# Patient Record
Sex: MALE | Race: WHITE | Employment: OTHER | URBAN - METROPOLITAN AREA
[De-identification: names, ages, dates, MRNs, and addresses within clinical notes are randomized per-mention and may not be internally consistent; named-entity substitution may affect disease eponyms.]

---

## 2017-07-28 ENCOUNTER — TELEPHONE (OUTPATIENT)
Dept: NEUROLOGY | Age: 57
End: 2017-07-28

## 2017-07-28 NOTE — TELEPHONE ENCOUNTER
----- Message from Trinity Castro sent at 7/28/2017 10:54 AM EDT -----  Regarding: Dr. Mendel [place] Telephone  Pt would like a callback to schedule Botox Injection.      [phone]

## 2017-08-02 NOTE — TELEPHONE ENCOUNTER
Called and spoke to patient scheduled for follow up with NP on 8/16  Will need new PA for botox  Patient states understanding

## 2017-08-16 ENCOUNTER — TELEPHONE (OUTPATIENT)
Dept: NEUROLOGY | Age: 57
End: 2017-08-16

## 2017-08-16 ENCOUNTER — OFFICE VISIT (OUTPATIENT)
Dept: NEUROLOGY | Age: 57
End: 2017-08-16

## 2017-08-16 VITALS
BODY MASS INDEX: 23.33 KG/M2 | DIASTOLIC BLOOD PRESSURE: 76 MMHG | WEIGHT: 176 LBS | SYSTOLIC BLOOD PRESSURE: 118 MMHG | HEIGHT: 73 IN

## 2017-08-16 DIAGNOSIS — R06.02 SOB (SHORTNESS OF BREATH): ICD-10-CM

## 2017-08-16 DIAGNOSIS — G43.909 MIGRAINE WITHOUT STATUS MIGRAINOSUS, NOT INTRACTABLE, UNSPECIFIED MIGRAINE TYPE: Primary | Chronic | ICD-10-CM

## 2017-08-16 NOTE — PROGRESS NOTES
Pt has questions about SUMAVEL prescription used to be 6 injections but it now 1 pen and cost $40 per pen. He states prescription now has a generic version. Pt went to hospital 07/2017 for migraines. Pt states he missed appt for botox and needs to be reevaluated to get prescription again.

## 2017-08-16 NOTE — PROGRESS NOTES
Tere Patel is a 64 y.o. male who presents with the following  Chief Complaint   Patient presents with    Follow-up       HPI Patient comes in for a follow up for migraines. He was on a botox schedule and doing well every 3 months but had some health concerns that came up so he got off track and his last botox was in December. On botox he had about 3-4 migraines a month and the sumavel aborted in an hour or so. He states that his migraines are usually located on the front right side of his head and it feels like an exploding pulsating pain. He has associated light sensitivity, nausea, and vomiting. He has tried relpax and percocet from his primary care doctor to help with abortive therapy and he states this does not help at all. He is currently on Elavil and Inderal. Tried Topamx. Also on Lamictal. sumavel is 40$ a pen now and insurance only gives him one. It is very painful also. Currently off botox he is getting about 20 headaches a month lasting all day. Definitely a lot worse and back to where he was before botox. .     Current Outpatient Prescriptions   Medication Sig    onabotulinumtoxinA (BOTOX) 200 unit injection 200 Units by IntraMUSCular route every three (3) months. Indications: MIGRAINE PREVENTION    SUMAtriptan succinate (ZEMBRACE SYMTOUCH) 3 mg/0.5 mL pnij 1 Syringe by SubCUTAneous route as needed. At headache onset. May repeat again every hour X 4 doses. Max 4 doses in 24 hours.  SUMAtriptan succinate (ZEMBRACE SYMTOUCH) 3 mg/0.5 mL pnij 1 Syringe by SubCUTAneous route as needed. At headache onset. May repeat again every hour X 4 doses. Max 4 doses in 24 hours.  dihydroergotamine (MIGRANAL) 0.5 mg/pump act. (4 mg/mL) nasal spray One spray in each nostril and repeat in 15 minutes X 1 . No more then 4 sprays in 24 hours    DULOXETINE HCL (CYMBALTA PO) Take  by mouth daily.  multivitamin (ONE A DAY) tablet Take 1 Tab by mouth daily.       cyanocobalamin (VITAMIN B-12) 500 mcg tablet Take 500 mcg by mouth daily.  VITAMIN A/VITAMIN D2/COD LIVER (VITAMINS A & D PO) Take  by mouth.  CALCIUM CITRATE/VITAMIN D3 (CALCIUM CITRATE + D PO) Take  by mouth.  PROPRANOLOL HCL (INDERAL PO) Take  by mouth. 10 MG NIGHTLY    amitriptyline (ELAVIL) 100 mg tablet Take  by mouth nightly. 2 tabs at bedtime    metFORMIN SR (FORTAMET) tablet Take 500 mg by mouth.  aspirin delayed-release 81 mg tablet Take  by mouth daily.  lansoprazole (PREVACID) 30 mg capsule Take  by mouth two (2) times a day.  metoprolol (LOPRESSOR) 25 mg tablet Take  by mouth two (2) times a day.  lamoTRIgine (LAMICTAL) 100 mg tablet Take  by mouth daily.  metoclopramide (REGLAN) 10 mg tablet Take 10 mg by mouth Before breakfast, lunch, dinner and at bedtime. No current facility-administered medications for this visit.         History   Smoking Status    Current Every Day Smoker    Packs/day: 1.00   Smokeless Tobacco    Never Used       Past Medical History:   Diagnosis Date    Anxiety     Arrhythmia     SINUS TACHY    Langford's esophagus 3/19/2012    CAD (coronary artery disease)     Chronic pain     Depression     Diabetes mellitus ([facility] [address] 3/19/2012    GERD (gastroesophageal reflux disease)     Headache     Hearing loss     History of MI (myocardial infarction) 3-00    History of peptic ulcer disease 3/19/2012    HTN (hypertension) 3/19/2012    Memory loss     Morbid obesity ([facility] [address] 3/19/2012    2002-OPEN GASTRIC BYPASS    Other ill-defined conditions     MIGRAINES    Psychiatric disorder     Reflux 3/19/2012    Sleep apnea, obstructive 3/19/2012    Unspecified adverse effect of anesthesia     HEADACHE    Unspecified sleep apnea     HX-PRIOR TO GASTRIC BYPASS       Past Surgical History:   Procedure Laterality Date    APPENDECTOMY      BIOPSY LIVER  9-3-02    HX APPENDECTOMY      HX CHOLECYSTECTOMY      HX GASTRIC BYPASS  9-3-02    HX HERNIA REPAIR  4.3.2012    Dr. Rose Almaguer - laparoscopic incisional hernia repair    HX TONSILLECTOMY      LAP,VAGUS NERVE,TRUNCAL      NEUROLOGICAL PROCEDURE UNLISTED  2004    CERVICAL FUSION       Family History   Problem Relation Age of Onset    Diabetes Mother     Hypertension Mother     Obesity Mother     Emphysema Mother     Heart Disease Mother     Cancer Father      prostate    Obesity Sister     Other Sister      aneurysm    Alcohol abuse Brother     Headache Brother     Other Brother      aneurysm       Social History     Social History    Marital status:      Spouse name: N/A    Number of children: N/A    Years of education: N/A     Social History Main Topics    Smoking status: Current Every Day Smoker     Packs/day: 1.00    Smokeless tobacco: Never Used    Alcohol use Yes    Drug use: No    Sexual activity: Not Asked     Other Topics Concern    None     Social History Narrative       Review of Systems   HENT: Negative for ear pain, hearing loss and tinnitus. Eyes: Positive for blurred vision and photophobia. Respiratory: Positive for cough and shortness of breath. Negative for wheezing. Cardiovascular: Negative for chest pain and palpitations. Gastrointestinal: Positive for nausea and vomiting. Neurological: Positive for dizziness and headaches. Negative for seizures, loss of consciousness and weakness. Remainder of comprehensive review is negative. Physical Exam :    Visit Vitals    /76    Ht 6' 1\" (1.854 m)    Wt 79.8 kg (176 lb)    BMI 23.22 kg/m2       General: Well defined, nourished, and groomed individual in no acute distress.    Neck: Supple, nontender, no bruits, no pain with resistance to active range of motion.    Heart: Regular rate and rhythm, no murmurs, rub, or gallop. Normal S1S2.   Lungs: Clear to auscultation bilaterally with equal chest expansion, no cough, no wheeze  Musculoskeletal: Extremities revealed no edema and had full range of motion of joints.    Psych: Good mood and bright affect    NEUROLOGICAL EXAMINATION:    Mental Status: Alert and oriented to person, place, and time    Cranial Nerves:    II, III, IV, VI: Visual acuity grossly intact. Visual fields are normal.    Pupils are equal, round, and reactive to light and accommodation.    Extra-ocular movements are full and fluid. Fundoscopic exam was benign, no ptosis or nystagmus.    V-XII: Hearing is grossly intact. Facial features are symmetric, with normal sensation and strength. The palate rises symmetrically and the tongue protrudes midline. Sternocleidomastoids 5/5. Motor Examination: Normal tone, bulk, and strength, 5/5 muscle strength throughout. Coordination: Finger to nose was normal. No resting or intention tremor    Gait and Station: Steady while walking. Normal arm swing. No pronator drift. No muscle wasting or fasiculations noted. Reflexes: DTRs 2+ throughout. [place]         Results for orders placed or performed during the hospital encounter of 04/03/12   GLUCOSE, POC   Result Value Ref Range    Glucose (POC) 97 75 - 110 mg/dL    Performed by Nella Silva [place])    GLUCOSE, POC   Result Value Ref Range    Glucose (POC) 111 (H) 75 - 110 mg/dL    Performed by JASMINA ROBERSON    GLUCOSE, POC   Result Value Ref Range    Glucose (POC) 118 (H) 75 - 110 mg/dL    Performed by OCHOA BURK    GLUCOSE, POC   Result Value Ref Range    Glucose (POC) 124 (H) 75 - 110 mg/dL    Performed by OCHOA BURK    GLUCOSE, POC   Result Value Ref Range    Glucose (POC) 133 (H) 75 - 110 mg/dL    [facility]    GLUCOSE, POC   Result Value Ref Range    Glucose (POC) 166 (H) 75 - 110 mg/dL    Performed by MARK SZYMANSKI        Orders Placed This Encounter    REFERRAL TO NEUROLOGY     Referral Priority:   Routine     Referral Type:   Consultation     Referral Reason:   Specialty Services Required     Referred to Provider:   Debbie Montanez MD    REFERRAL TO PULMONARY DISEASE     Referral Priority:   Routine     Referral Type:   Consultation     Referral Reason:   Specialty Services Required     Referral Location:   [facility] [address].     Referred to Provider:   Kvng Kurtz MD    onabotulinumtoxinA (BOTOX) 200 unit injection     Si Units by IntraMUSCular route every three (3) months. Indications: MIGRAINE PREVENTION     Dispense:  200 Units     Refill:  3    SUMAtriptan succinate (ZEMBRACE SYMTOUCH) 3 mg/0.5 mL pnij     Si Syringe by SubCUTAneous route as needed. At headache onset. May repeat again every hour X 4 doses. Max 4 doses in 24 hours. Dispense:  2 Box     Refill:  5    SUMAtriptan succinate (ZEMBRACE SYMTOUCH) 3 mg/0.5 mL pnij     Si Syringe by SubCUTAneous route as needed. At headache onset. May repeat again every hour X 4 doses. Max 4 doses in 24 hours. Dispense:  2 Box     Refill:  5       1. Migraine without status migrainosus, not intractable, unspecified migraine type    2. SOB (shortness of breath)        Follow-up Disposition:  Return after botox. Migraines worse. We discussed starting back on Botox and we will reorder with his frequency, intensity and time as 20 headaches a month lasting all day. Continue elavil and inderal and lamictal. We will switch from [facility] to [facility] as it is not painful, less triptan effect and can use up to 4 doses daily for migraine abortive therapy. We will follow after botox.          This note will [facility] be viewable in [facility]

## 2017-08-16 NOTE — MR AVS SNAPSHOT
Visit Information Date & Time Provider Department Dept. Phone Encounter #  
 8/16/2017  9:00 AM Abby Davies NP [facility] [phone] [id number] Follow-up Instructions Return after botox. Upcoming Health Maintenance Date Due Hepatitis C Screening 1960 HEMOGLOBIN A1C Q6M 1960 LIPID PANEL Q1 1960 FOOT EXAM Q1 11/6/1970 MICROALBUMIN Q1 11/6/1970 EYE EXAM RETINAL OR DILATED Q1 11/6/1970 Pneumococcal 19-64 Medium Risk (1 of 1 - PPSV23) 11/6/1979 DTaP/Tdap/Td series (1 - Tdap) 11/6/1981 FOBT Q 1 YEAR AGE 50-75 11/6/2010 INFLUENZA AGE 9 TO ADULT 8/1/2017 Allergies as of 8/16/2017  Review Complete On: 8/16/2017 By: Shamar Terrell No Known Allergies Current Immunizations  Never Reviewed No immunizations on file. Not reviewed this visit You Were Diagnosed With   
  
 Codes Comments Migraine without status migrainosus, not intractable, unspecified migraine type    -  Primary ICD-10-CM: M13.896 ICD-9-CM: 346.90   
 SOB (shortness of breath)     ICD-10-CM: R06.02 
ICD-9-CM: 786.05 Vitals BP Height(growth percentile) Weight(growth percentile) BMI Smoking Status 118/76 6' 1\" (1.854 m) 176 lb (79.8 kg) 23.22 kg/m2 Current Every Day Smoker Vitals History BMI and BSA Data Body Mass Index Body Surface Area  
 23.22 kg/m 2 2.03 m 2 Preferred Pharmacy Pharmacy Name Phone [facility] [phone] Your Updated Medication List  
  
   
This list is accurate as of: 8/16/17  9:35 AM.  Always use your most recent med list.  
  
  
  
  
 amitriptyline 100 mg tablet Commonly known as:  ELAVIL Take  by mouth nightly. 2 tabs at bedtime  
  
 aspirin delayed-release 81 mg tablet Take  by mouth daily. CALCIUM CITRATE + D PO Take  by mouth. CYMBALTA PO Take  by mouth daily. dihydroergotamine 0.5 mg/pump act. (4 mg/mL) nasal spray Commonly known as:  Brendalyn Haggis One spray in each nostril and repeat in 15 minutes X 1 . No more then 4 sprays in 24 hours FORTAMET 500 mg Generic drug:  metFORMIN SR Take 500 mg by mouth. INDERAL PO Take  by mouth. 10 MG NIGHTLY  
  
 lamoTRIgine 100 mg tablet Commonly known as: LaMICtal  
Take  by mouth daily. metoprolol tartrate 25 mg tablet Commonly known as:  LOPRESSOR Take  by mouth two (2) times a day. multivitamin tablet Commonly known as:  ONE A DAY Take 1 Tab by mouth daily. onabotulinumtoxinA 200 unit injection Commonly known as:  BOTOX  
200 Units by IntraMUSCular route every three (3) months. Indications: MIGRAINE PREVENTION  
  
 PREVACID 30 mg capsule Generic drug:  lansoprazole Take  by mouth two (2) times a day. REGLAN 10 mg tablet Generic drug:  metoclopramide HCl Take 10 mg by mouth Before breakfast, lunch, dinner and at bedtime. * SUMAtriptan succinate 3 mg/0.5 mL Pnij Commonly known as:  Youlanda Plan  
1 Syringe by SubCUTAneous route as needed. At headache onset. May repeat again every hour X 4 doses. Max 4 doses in 24 hours. * SUMAtriptan succinate 3 mg/0.5 mL Pnij Commonly known as:  Youlanda Plan  
1 Syringe by SubCUTAneous route as needed. At headache onset. May repeat again every hour X 4 doses. Max 4 doses in 24 hours. VITAMIN B-12 500 mcg tablet Generic drug:  cyanocobalamin Take 500 mcg by mouth daily. VITAMINS A & D PO Take  by mouth. * Notice: This list has 2 medication(s) that are the same as other medications prescribed for you. Read the directions carefully, and ask your doctor or other care provider to review them with you. Prescriptions Printed Refills  
 onabotulinumtoxinA (BOTOX) 200 unit injection 3 Si Units by IntraMUSCular route every three (3) months. Indications: MIGRAINE PREVENTION Class: Print Route: IntraMUSCular Prescriptions Sent to Pharmacy Refills SUMAtriptan succinate (ZEMBRACE SYMTOUCH) 3 mg/0.5 mL pnij 5 Si Syringe by SubCUTAneous route as needed. At headache onset. May repeat again every hour X 4 doses. Max 4 doses in 24 hours. Class: Normal  
 Pharmacy: [facility] [id number] [facility], [address] [address] Ph #: [phone] Route: SubCUTAneous SUMAtriptan succinate (ZEMBRACE SYMTOUCH) 3 mg/0.5 mL pnij 5 Si Syringe by SubCUTAneous route as needed. At headache onset. May repeat again every hour X 4 doses. Max 4 doses in 24 hours. Class: Normal  
 Pharmacy: [facility] Ph #: [phone] Route: SubCUTAneous We Performed the Following REFERRAL TO NEUROLOGY [FDP19 Custom] Comments:  
 Please evaluate patient for botox REFERRAL TO PULMONARY DISEASE [BJG64 Custom] Comments:  
 Please evaluate patient for SOB Follow-up Instructions Return after botox. Referral Information Referral ID Referred By Referred To  
  
 [id number] Laina Fernandez MD   
   [address] Phone: [phone] Fax: [phone] Visits Status Start Date End Date 1 New Request 17 If your referral has a status of pending review or denied, additional information will be sent to support the outcome of this decision. Referral ID Referred By Referred To  
 [id number] [facility] [address] [place] [address] 33 Visits Status Start Date End Date 1 New Request 17 If your referral has a status of pending review or denied, additional information will be sent to support the outcome of this decision. Patient Instructions PRESCRIPTION REFILL POLICY [facility] Statement to Patients April 1, 2014 In an effort to ensure the large volume of patient prescription refills is processed in the most efficient and expeditious manner, we are asking our patients to assist us by calling your Pharmacy for all prescription refills, this will include also your  Mail Order Pharmacy. The pharmacy will contact our office electronically to continue the refill process. Please do not wait until the last minute to call your pharmacy. We need at least 48 hours (2days) to fill prescriptions. We also encourage you to call your pharmacy before going to  your prescription to make sure it is ready. With regard to controlled substance prescription refill requests (narcotic refills) that need to be picked up at our office, we ask your cooperation by providing us with at least 72 hours (3days) notice that you will need a refill. We will not refill narcotic prescription refill requests after 4:00pm on any weekday, Monday through Thursday, or after 2:00pm on Fridays, or on the weekends. We encourage everyone to explore another way of getting your prescription refill request processed using [employer], our patient web portal through our electronic medical record system. [employer] is an efficient and effective way to communicate your medication request directly to the office and  downloadable as an uriah on your smart phone . [employer] also features a review functionality that allows you to view your medication list as well as leave messages for your physician. Are you ready to get connected? If so please review the attatched instructions or speak to any of our staff to get you set up right away! Thank you so much for your cooperation. Should you have any questions please contact our Practice Administrator. The Physicians and Staff,  [facility] [place] [address] What is a living will? A living will is a legal form you use to write down the kind of care you want at the end of your life. It is used by the health professionals who will treat you if you aren't able to decide for yourself. If you put your wishes in writing, your loved ones and others will know what kind of care you want. They won't need to guess. This can ease your mind and be helpful to others. A living will is not the same as an estate or property will. An estate will explains what you want to happen with your money and property after you die. Is a living will a legal document? A living will is a legal document. Each state has its own laws about living warren. If you move to another state, make sure that your living will is legal in the state where you now live. Or you might use a universal form that has been approved by many states. This kind of form can sometimes be completed and stored online. Your electronic copy will then be available wherever you have a connection to the Internet. In most cases, doctors will respect your wishes even if you have a form from a different state. · You don't need an  to complete a living will. But legal advice can be helpful if your state's laws are unclear, your health history is complicated, or your family can't agree on what should be in your living will. · You can change your living will at any time. Some people find that their wishes about end-of-life care change as their health changes. · In addition to making a living will, think about completing a medical power of  form. This form lets you name the person you want to make end-of-life treatment decisions for you (your \"health care agent\") if you're not able to. Many hospitals and nursing homes will give you the forms you need to complete a living will and a medical power of .  
· Your living will is used only if you can't make or communicate decisions for yourself anymore. If you become able to make decisions again, you can accept or refuse any treatment, no matter what you wrote in your living will. · Your state may offer an online registry. This is a place where you can store your living will online so the doctors and nurses who need to treat you can find it right away. What should you think about when creating a living will? Talk about your end-of-life wishes with your family members and your doctor. Let them know what you want. That way the people making decisions for you won't be surprised by your choices. Think about these questions as you make your living will: · Do you know enough about life support methods that might be used? If not, talk to your doctor so you know what might be done if you can't breathe on your own, your heart stops, or you're unable to swallow. · What things would you still want to be able to do after you receive life-support methods? Would you want to be able to walk? To speak? To eat on your own? To live without the help of machines? · If you have a choice, where do you want to be cared for? In your home? At a hospital or nursing home? · Do you want certain Church practices performed if you become very ill? · If you have a choice at the end of your life, where would you prefer to die? At home? In a hospital or nursing home? Somewhere else? · Would you prefer to be buried or cremated? · Do you want your organs to be donated after you die? What should you do with your living will? · Make sure that your family members and your health care agent have copies of your living will. · Give your doctor a copy of your living will to keep in your medical record. If you have more than one doctor, make sure that each one has a copy. · You may want to put a copy of your living will where it can be easily found. Where can you learn more? Go to http://lore-vin.info/. Enter O560 in the search box to learn more about \"Learning About Living Miguel Ángel. \" Current as of: August 8, 2016 Content Version: 11.3 © 0599-0172 [employer]. Care instructions adapted under license by skedge.me (which disclaims liability or warranty for this information). If you have questions about a medical condition or this instruction, always ask your healthcare professional. [place] [address] any warranty or liability for your use of this information. Advance Directives: Care Instructions Your Care Instructions An advance directive is a legal way to state your wishes at the end of your life. It tells your family and your doctor what to do if you can no longer say what you want. There are two main types of advance directives. You can change them any time that your wishes change. · A living will tells your family and your doctor your wishes about life support and other treatment. · A durable power of  for health care lets you name a person to make treatment decisions for you when you can't speak for yourself. This person is called a health care agent. If you do not have an advance directive, decisions about your medical care may be made by a doctor or a  who doesn't know you. It may help to think of an advance directive as a gift to the people who care for you. If you have one, they won't have to make tough decisions by themselves. Follow-up care is a key part of your treatment and safety. Be sure to make and go to all appointments, and call your doctor if you are having problems. It's also a good idea to know your test results and keep a list of the medicines you take. How can you care for yourself at home? · Discuss your wishes with your loved ones and your doctor. This way, there are no surprises. · Many states have a unique form. Or you might use a universal form that has been approved by many states.  This kind of form can sometimes be completed and stored online. Your electronic copy will then be available wherever you have a connection to the Internet. In most cases, doctors will respect your wishes even if you have a form from a different state. · You don't need a  to do an advance directive. But you may want to get legal advice. · Think about these questions when you prepare an advance directive: ¨ Who do you want to make decisions about your medical care if you are not able to? Many people choose a family member or close friend. ¨ Do you know enough about life support methods that might be used? If not, talk to your doctor so you understand. ¨ What are you most afraid of that might happen? You might be afraid of having pain, losing your independence, or being kept alive by machines. ¨ Where would you prefer to die? Choices include your home, a hospital, or a nursing home. ¨ Would you like to have information about hospice care to support you and your family? ¨ Do you want to donate organs when you die? ¨ Do you want certain Sikhism practices performed before you die? If so, put your wishes in the advance directive. · Read your advance directive every year, and make changes as needed. When should you call for help? Be sure to contact your doctor if you have any questions. Where can you learn more? Go to http://lore-vin.info/. Enter R264 in the search box to learn more about \"Advance Directives: Care Instructions. \" Current as of: November 17, 2016 Content Version: 11.3 © 3821-6227 Shoutlet. Care instructions adapted under license by [employer] (which disclaims liability or warranty for this information). If you have questions about a medical condition or this instruction, always ask your healthcare professional. [place] [address] any warranty or liability for your use of this information. Introducing [facility]! Alysia Cabezas introduces [facility] patient portal. Now you can access parts of your medical record, email your doctor's office, and request medication refills online. 1. In your internet browser, go to [email or web address] on the Run 2. Click on the First Time User? Click Here link in the Sign In box. You will see the New Member Sign Up page. 3. Enter your [facility] Access Code exactly as it appears below. You will not need to use this code after youve completed the sign-up process. If you do not sign up before the expiration date, you must request a new code. · [facility] Access Code: [email or web address] Expires: 11/14/2017  9:35 AM 
 
4. Enter the last four digits of your Social Security Number (xxxx) and Date of Birth (mm/dd/yyyy) as indicated and click Submit. You will be taken to the next sign-up page. 5. Create a [facility] ID. This will be your [facility] login ID and cannot be changed, so think of one that is secure and easy to remember. 6. Create a [facility] password. You can change your password at any time. 7. Enter your Password Reset Question and Answer. This can be used at a later time if you forget your password. 8. Enter your e-mail address. You will receive e-mail notification when new information is available in [address] 9. Click Sign Up. You can now view and download portions of your medical record. 10. Click the Download Summary menu link to download a portable copy of your medical information. If you have questions, please visit the Frequently Asked Questions section of the [facility] website. Remember, [facility] is NOT to be used for urgent needs. For medical emergencies, dial 911. Now available from your iPhone and Android! Please provide this summary of care documentation to your next provider. Your primary care clinician is listed as Dino Whittington. If you have any questions after today's visit, please call [phone].

## 2017-08-29 ENCOUNTER — TELEPHONE (OUTPATIENT)
Dept: NEUROLOGY | Age: 57
End: 2017-08-29

## 2017-09-05 NOTE — TELEPHONE ENCOUNTER
Called and spoke to pt.  Scheduled botox inj for Tuesday, September 19, 2017 08:00 AM  With Dr. Bernard Orellana

## 2017-09-19 ENCOUNTER — OFFICE VISIT (OUTPATIENT)
Dept: NEUROLOGY | Age: 57
End: 2017-09-19

## 2017-09-19 VITALS
OXYGEN SATURATION: 97 % | DIASTOLIC BLOOD PRESSURE: 74 MMHG | SYSTOLIC BLOOD PRESSURE: 128 MMHG | HEART RATE: 91 BPM | WEIGHT: 184.5 LBS | TEMPERATURE: 98.9 F | BODY MASS INDEX: 24.45 KG/M2 | HEIGHT: 73 IN | RESPIRATION RATE: 18 BRPM

## 2017-09-19 DIAGNOSIS — G43.709 CHRONIC MIGRAINE WITHOUT AURA WITHOUT STATUS MIGRAINOSUS, NOT INTRACTABLE: Primary | ICD-10-CM

## 2017-09-19 DIAGNOSIS — G43.909 MIGRAINE WITHOUT STATUS MIGRAINOSUS, NOT INTRACTABLE, UNSPECIFIED MIGRAINE TYPE: Chronic | ICD-10-CM

## 2017-09-19 NOTE — PROGRESS NOTES
This patient had BotoxA  administered for 31 injections all intramuscular and with the exception of one injection, the procerus, they were right and left-sided. The medicine was reconstituted as Botox A 200 units with 4cc of 0.9% normal saline without preservative. The ensuing mixture was 5 units per 1/10CC and administered intramuscularly. The  muscles x2 for a total of 10 units, the procerus muscle x1 per 5 units, the frontalis muscle x4 for 20 units, the temporalis muscles x8 for 40 units,  Occipitalis muscles x6 for 30 units, the cervical paraspinals x4 for 20 units, and finally the trapezius muscles x6 for a total of 30 units. This totalled 155 units of Botox A with 45 units wastage. Patient tolerated the procedure without any ill effects. It is recommended that she keep her head upright for the next 4 hours.       [facility]  OFFICE PROCEDURE PROGRESS NOTE        Chart reviewed for the following:   Josue Fajardo MD, have reviewed the History, Physical and updated the Allergic reactions for [employer]     TIME OUT performed immediately prior to start of procedure:   I, Ijeoma Whitlock MD, have performed the following reviews on [place] prior to the start of the procedure:            * Patient was identified by name and date of birth   * Agreement on procedure being performed was verified  * Risks and Benefits explained to the patient  * Procedure site verified and marked as necessary  * Patient was positioned for comfort  * Consent was signed and verified     Time: 235 PM      Date of procedure: 9/19/2017    Procedure performed by:  Ijeoma Whitlock MD    Provider assisted by: n/a    Patient assisted by:n/a    How tolerated by patient:tolerated    Post Procedural Pain Scale: 2 - Hurts Little Bit    Comments: none

## 2017-09-19 NOTE — PROGRESS NOTES
Pain scale prior to procedure   0/10  Pain scale post procedure     0/10  Patient states 3-5  headaches a month & lasting  48-72  hrs  Been treated for headaches greater than 6 months  Consent signed     Instructions post injection discussed with patient   1. Do not lay flat for 4 hrs  2. Do not press on injection sites up to 24 hrs.         What to expect  Possible bleeding and/or swelling  at injection sites      Patient verbalizes understanding        Reviewed record in preparation for visit and have necessary documentation  Pt did not bring medication to office visit for review  Information was given to pt on Advanced Directives, Living Will  opportunity was given for questions

## 2017-09-19 NOTE — MR AVS SNAPSHOT
Visit Information Date & Time Provider Department Dept. Phone Encounter #  
 9/19/2017  2:00 PM MD Candi Silverio [facility] Neurology [facility] [phone] [id number] Upcoming Health Maintenance Date Due Hepatitis C Screening 1960 HEMOGLOBIN A1C Q6M 1960 LIPID PANEL Q1 1960 FOOT EXAM Q1 11/6/1970 MICROALBUMIN Q1 11/6/1970 EYE EXAM RETINAL OR DILATED Q1 11/6/1970 Pneumococcal 19-64 Medium Risk (1 of 1 - PPSV23) 11/6/1979 DTaP/Tdap/Td series (1 - Tdap) 11/6/1981 FOBT Q 1 YEAR AGE 50-75 11/6/2010 INFLUENZA AGE 9 TO ADULT 8/1/2017 Allergies as of 9/19/2017  Review Complete On: 9/19/2017 By: Lauryn Farley MD  
 No Known Allergies Current Immunizations  Never Reviewed No immunizations on file. Not reviewed this visit You Were Diagnosed With   
  
 Codes Comments Chronic migraine without aura without status migrainosus, not intractable    -  Primary ICD-10-CM: P66.363 ICD-9-CM: 346.70 Vitals BP Pulse Temp Resp Height(growth percentile) Weight(growth percentile) 128/74 91 98.9 °F (37.2 °C) (Oral) 18 6' 1\" (1.854 m) 184 lb 8 oz (83.7 kg) SpO2 BMI Smoking Status 97% 24.34 kg/m2 Current Every Day Smoker Vitals History BMI and BSA Data Body Mass Index Body Surface Area  
 24.34 kg/m 2 2.08 m 2 Preferred Pharmacy Pharmacy Name Phone [facility] [phone] Your Updated Medication List  
  
   
This list is accurate as of: 9/19/17  2:32 PM.  Always use your most recent med list.  
  
  
  
  
 amitriptyline 100 mg tablet Commonly known as:  ELAVIL Take  by mouth nightly. 2 tabs at bedtime  
  
 aspirin delayed-release 81 mg tablet Take  by mouth daily. CALCIUM CITRATE + D PO Take  by mouth. CYMBALTA PO Take  by mouth daily. dihydroergotamine 0.5 mg/pump act. (4 mg/mL) nasal spray Commonly known as:  Taisha Persons One spray in each nostril and repeat in 15 minutes X 1 . No more then 4 sprays in 24 hours FORTAMET 500 mg Generic drug:  metFORMIN SR Take 500 mg by mouth. INDERAL PO Take  by mouth. 10 MG NIGHTLY  
  
 lamoTRIgine 100 mg tablet Commonly known as: LaMICtal  
Take  by mouth daily. metoprolol tartrate 25 mg tablet Commonly known as:  LOPRESSOR Take  by mouth two (2) times a day. multivitamin tablet Commonly known as:  ONE A DAY Take 1 Tab by mouth daily. onabotulinumtoxinA 200 unit injection Commonly known as:  BOTOX  
200 Units by IntraMUSCular route every three (3) months. Indications: MIGRAINE PREVENTION  
  
 PREVACID 30 mg capsule Generic drug:  lansoprazole Take  by mouth two (2) times a day. REGLAN 10 mg tablet Generic drug:  metoclopramide HCl Take 10 mg by mouth Before breakfast, lunch, dinner and at bedtime. SUMAtriptan succinate 3 mg/0.5 mL Pnij Commonly known as:  Jane Punt  
1 Syringe by SubCUTAneous route as needed. At headache onset. May repeat again every hour X 4 doses. Max 4 doses in 24 hours. VITAMIN B-12 500 mcg tablet Generic drug:  cyanocobalamin Take 500 mcg by mouth daily. VITAMINS A & D PO Take  by mouth. Patient Instructions OnabotulinumtoxinA (By injection) OnabotulinumtoxinA (nx-k-vyk-vp-VKT-zhz-tox-in-ay) Treats muscle stiffness, muscle spasms, excessive sweating, overactive bladder, or loss of bladder control. Prevents chronic migraine headaches. Improves the appearance of wrinkles on the face. Brand Name(s): Botox, Botox Cosmetic There may be other brand names for this medicine. When This Medicine Should Not Be Used: This medicine is not right for everyone.  You should not receive this medicine if you had an allergic reaction to onabotulinumtoxinA or any other botulinum toxin product. How to Use This Medicine:  
Injectable · Your doctor will prescribe your exact dose and tell you how often it should be given. This medicine is given by a healthcare provider as a shot under your skin or into a muscle. · You may be given medicine to numb the area where the shot will be injected. If you receive the medicine around your eyes, you may be given eye drops or ointment to numb the area. After your injection, you may need to wear a protective contact lens or eye patch. · If you are being treated for excessive sweating, shave your underarms but do not use deodorant for 24 hours before your injection. Avoid exercise, hot foods or liquids, or anything else that could make you sweat for 30 minutes before your injection. · The recommended treatment schedule for chronic migraine is every 12 weeks. · This medicine works slowly. Once your condition has improved, the medicine will last about 3 months, then the effects will slowly go away. You might need more injections to treat your condition. ¨ Muscle spasms in the eyelids should improve within 3 to 10 days. ¨ Eye muscle problems should improve 1 or 2 days after the injection, and the improvement should last for 2 to 6 weeks. ¨ Neck pain should improve within 2 to 6 weeks. ¨ Arm stiffness should improve within 4 to 6 weeks. ¨ Facial lines or wrinkles should improve 1 or 2 days. · This medicine should come with a Medication Guide. Ask your pharmacist for a copy if you do not have one. · Missed dose:Call your doctor or pharmacist for instructions. Drugs and Foods to Avoid: Ask your doctor or pharmacist before using any other medicine, including over-the-counter medicines, vitamins, and herbal products. · Some foods and medicine can affect how onabotulinumtoxinA works. Tell your doctor if you are using any of the following: ¨ Aspirin or a blood thinner (such as ticlopidine, warfarin) ¨ Muscle relaxer ¨ Medicine for an infection (such as amikacin, gentamicin, streptomycin, tobramycin) · Tell your doctor if you have received an injection of any botulinum toxin product within the past 4 months. Warnings While Using This Medicine: · Tell your doctor if you are pregnant or breastfeeding, or if you have breathing or lung problems, bleeding problems, heart or blood vessel disease, or nerve or muscle problems (such as myasthenia gravis). Tell your doctor if you have ever had face surgery or if you have a urinary tract infection or trouble urinating, diabetes, or multiple sclerosis. · This medicine may cause the following problems: ¨ Muscle weakness, loss of bladder control, trouble swallowing, speaking, or breathing (caused by the toxin spreading to other parts of your body) · This medicine may make your muscles weak or cause vision problems. Do not drive or do anything else that could be dangerous until you know how this medicine affects you. · There are some warnings that only apply if you are receiving this medicine to treat the following: ¨ Injections near the eye: This medicine may reduce blinking, which can raise the risk of eye problems such as corneal exposure and ulcers. Tell your doctor right away if you notice that you are blinking less than usual or your eyes feel dry. ¨ Urinary incontinence: This medicine may cause autonomic dysreflexia, which can be a life-threatening condition. ¨ Overactive bladder: Check with your doctor right away if you have trouble urinating or a burning sensation while urinating. · This medicine contains products from donated human blood, so it may contain viruses, although the risk is low. Human donors and blood are always tested for viruses to keep the risk low. Talk with your doctor about this risk if you are concerned. · Your doctor will check your progress and the effects of this medicine at regular visits. Keep all appointments. Possible Side Effects While Using This Medicine:  
Call your doctor right away if you notice any of these side effects: · Allergic reaction: Itching or hives, swelling in your face or hands, swelling or tingling in your mouth or throat, chest tightness, trouble breathing · Blurred or double vision, droopy eyelids · Change in how much or how often you urinate, trouble urinating, or painful urination · Chest pain, slow or uneven heartbeat · Headache, increased sweating, warmth or redness in your face, neck, or arm · Muscle weakness · Trouble swallowing, talking, or breathing If you notice these less serious side effects, talk with your doctor: · Fever, chills, cough, stuffy or runny nose, sore throat, and body aches · Pain in your neck, back, arms, or legs · Redness, pain, tenderness, bruising, swelling, or weakness where the shot was given If you notice other side effects that you think are caused by this medicine, tell your doctor. Call your doctor for medical advice about side effects. You may report side effects to FDA at 8-340-FDA-0721 © 2017 [address] Information is for End User's use only and may not be sold, redistributed or otherwise used for commercial purposes. The above information is an  only. It is not intended as medical advice for individual conditions or treatments. Talk to your doctor, nurse or pharmacist before following any medical regimen to see if it is safe and effective for you. Botox encounter only Introducing hospitals & HEALTH SERVICES! [facility] introduces [employer] patient portal. Now you can access parts of your medical record, email your doctor's office, and request medication refills online. 1. In your internet browser, go to [email or web address] 2. Click on the First Time User? Click Here link in the Sign In box. You will see the New Member Sign Up page. 3. Enter your [employer] Access Code exactly as it appears below. You will not need to use this code after youve completed the sign-up process. If you do not sign up before the expiration date, you must request a new code. · [employer] Access Code: [email or web address] Expires: 11/14/2017  9:35 AM 
 
4. Enter the last four digits of your Social Security Number (xxxx) and Date of Birth (mm/dd/yyyy) as indicated and click Submit. You will be taken to the next sign-up page. 5. Create a [employer] ID. This will be your [employer] login ID and cannot be changed, so think of one that is secure and easy to remember. 6. Create a [employer] password. You can change your password at any time. 7. Enter your Password Reset Question and Answer. This can be used at a later time if you forget your password. 8. Enter your e-mail address. You will receive e-mail notification when new information is available in [address] 9. Click Sign Up. You can now view and download portions of your medical record. 10. Click the Download Summary menu link to download a portable copy of your medical information. If you have questions, please visit the Frequently Asked Questions section of the [employer] website. Remember, [employer] is NOT to be used for urgent needs. For medical emergencies, dial 911. Now available from your iPhone and Android! Please provide this summary of care documentation to your next provider. Your primary care clinician is listed as Benji Alanis. If you have any questions after today's visit, please call [phone].

## 2017-09-19 NOTE — PATIENT INSTRUCTIONS
OnabotulinumtoxinA (By injection)   OnabotulinumtoxinA (fx-l-hsp-mw-YKM-kqx-tox-in-ay)  Treats muscle stiffness, muscle spasms, excessive sweating, overactive bladder, or loss of bladder control. Prevents chronic migraine headaches. Improves the appearance of wrinkles on the face. Brand Name(s): Botox, Botox Cosmetic   There may be other brand names for this medicine. When This Medicine Should Not Be Used: This medicine is not right for everyone. You should not receive this medicine if you had an allergic reaction to onabotulinumtoxinA or any other botulinum toxin product. How to Use This Medicine:   Injectable  · Your doctor will prescribe your exact dose and tell you how often it should be given. This medicine is given by a healthcare provider as a shot under your skin or into a muscle. · You may be given medicine to numb the area where the shot will be injected. If you receive the medicine around your eyes, you may be given eye drops or ointment to numb the area. After your injection, you may need to wear a protective contact lens or eye patch. · If you are being treated for excessive sweating, shave your underarms but do not use deodorant for 24 hours before your injection. Avoid exercise, hot foods or liquids, or anything else that could make you sweat for 30 minutes before your injection. · The recommended treatment schedule for chronic migraine is every 12 weeks. · This medicine works slowly. Once your condition has improved, the medicine will last about 3 months, then the effects will slowly go away. You might need more injections to treat your condition. ¨ Muscle spasms in the eyelids should improve within 3 to 10 days. ¨ Eye muscle problems should improve 1 or 2 days after the injection, and the improvement should last for 2 to 6 weeks. ¨ Neck pain should improve within 2 to 6 weeks. ¨ Arm stiffness should improve within 4 to 6 weeks.   ¨ Facial lines or wrinkles should improve 1 or 2 days.  · This medicine should come with a Medication Guide. Ask your pharmacist for a copy if you do not have one. · Missed dose:Call your doctor or pharmacist for instructions. Drugs and Foods to Avoid:   Ask your doctor or pharmacist before using any other medicine, including over-the-counter medicines, vitamins, and herbal products. · Some foods and medicine can affect how onabotulinumtoxinA works. Tell your doctor if you are using any of the following:  ¨ Aspirin or a blood thinner (such as ticlopidine, warfarin)  ¨ Muscle relaxer  ¨ Medicine for an infection (such as amikacin, gentamicin, streptomycin, tobramycin)  · Tell your doctor if you have received an injection of any botulinum toxin product within the past 4 months. Warnings While Using This Medicine:   · Tell your doctor if you are pregnant or breastfeeding, or if you have breathing or lung problems, bleeding problems, heart or blood vessel disease, or nerve or muscle problems (such as myasthenia gravis). Tell your doctor if you have ever had face surgery or if you have a urinary tract infection or trouble urinating, diabetes, or multiple sclerosis. · This medicine may cause the following problems:  ¨ Muscle weakness, loss of bladder control, trouble swallowing, speaking, or breathing (caused by the toxin spreading to other parts of your body)  · This medicine may make your muscles weak or cause vision problems. Do not drive or do anything else that could be dangerous until you know how this medicine affects you. · There are some warnings that only apply if you are receiving this medicine to treat the following:   ¨ Injections near the eye: This medicine may reduce blinking, which can raise the risk of eye problems such as corneal exposure and ulcers. Tell your doctor right away if you notice that you are blinking less than usual or your eyes feel dry. ¨ Urinary incontinence:  This medicine may cause autonomic dysreflexia, which can be a life-threatening condition. ¨ Overactive bladder: Check with your doctor right away if you have trouble urinating or a burning sensation while urinating. · This medicine contains products from donated human blood, so it may contain viruses, although the risk is low. Human donors and blood are always tested for viruses to keep the risk low. Talk with your doctor about this risk if you are concerned. · Your doctor will check your progress and the effects of this medicine at regular visits. Keep all appointments. Possible Side Effects While Using This Medicine:   Call your doctor right away if you notice any of these side effects:  · Allergic reaction: Itching or hives, swelling in your face or hands, swelling or tingling in your mouth or throat, chest tightness, trouble breathing  · Blurred or double vision, droopy eyelids  · Change in how much or how often you urinate, trouble urinating, or painful urination  · Chest pain, slow or uneven heartbeat  · Headache, increased sweating, warmth or redness in your face, neck, or arm  · Muscle weakness  · Trouble swallowing, talking, or breathing  If you notice these less serious side effects, talk with your doctor:   · Fever, chills, cough, stuffy or runny nose, sore throat, and body aches  · Pain in your neck, back, arms, or legs  · Redness, pain, tenderness, bruising, swelling, or weakness where the shot was given  If you notice other side effects that you think are caused by this medicine, tell your doctor. Call your doctor for medical advice about side effects. You may report side effects to FDA at 0-830-FDA-2263  © 2017 [address] Information is for End User's use only and may not be sold, redistributed or otherwise used for commercial purposes. The above information is an  only. It is not intended as medical advice for individual conditions or treatments.  Talk to your doctor, nurse or pharmacist before following any medical regimen to see if it is safe and effective for you.   Botox encounter only

## 2018-06-19 ENCOUNTER — TELEPHONE (OUTPATIENT)
Dept: NEUROLOGY | Age: 58
End: 2018-06-19

## 2018-06-19 NOTE — TELEPHONE ENCOUNTER
----- Message from Joseline Carmona sent at 6/19/2018  9:14 AM EDT -----  Regarding:  Krystin Wong, wife, requested a call back to schedule a Botox injection appt. Pt best contact [phone].

## 2018-06-21 NOTE — TELEPHONE ENCOUNTER
Called and spoke to patient's wife, Kye Worley. [facility] home delivery. They are processing the script and running benefits.  After they will contact the patient before calling us to set up delivery

## 2018-06-26 ENCOUNTER — TELEPHONE (OUTPATIENT)
Dept: NEUROLOGY | Age: 58
End: 2018-06-26

## 2018-06-26 NOTE — TELEPHONE ENCOUNTER
Received Botox 200 units  Rx: [id number]  Refills: [address] Levine, Susan. \[facility].\""  [phone]

## 2018-07-06 ENCOUNTER — OFFICE VISIT (OUTPATIENT)
Dept: NEUROLOGY | Age: 58
End: 2018-07-06

## 2018-07-06 VITALS
BODY MASS INDEX: 23.07 KG/M2 | WEIGHT: 174.1 LBS | SYSTOLIC BLOOD PRESSURE: 120 MMHG | RESPIRATION RATE: 18 BRPM | HEART RATE: 69 BPM | OXYGEN SATURATION: 98 % | DIASTOLIC BLOOD PRESSURE: 64 MMHG | HEIGHT: 73 IN

## 2018-07-06 DIAGNOSIS — G43.909 MIGRAINE WITHOUT STATUS MIGRAINOSUS, NOT INTRACTABLE, UNSPECIFIED MIGRAINE TYPE: Chronic | ICD-10-CM

## 2018-07-06 DIAGNOSIS — G43.709 CHRONIC MIGRAINE WITHOUT AURA WITHOUT STATUS MIGRAINOSUS, NOT INTRACTABLE: Primary | ICD-10-CM

## 2018-07-06 RX ORDER — CEFUROXIME AXETIL 250 MG/1
6 TABLET ORAL
Qty: 6 KIT | Refills: 6 | Status: SHIPPED | OUTPATIENT
Start: 2018-07-06 | End: 2018-07-06

## 2018-07-06 NOTE — PROGRESS NOTES
This patient had BotoxA  administered for 31 injections all intramuscular and with the exception of one injection, the procerus, they were right and left-sided. The medicine was reconstituted as Botox A 200 units with 4cc of 0.9% normal saline without preservative. The ensuing mixture was 5 units per 1/10CC and administered intramuscularly. The  muscles x2 for a total of 10 units, the procerus muscle x1 per 5 units, the frontalis muscle x4 for 20 units, the temporalis muscles x8 for 40 units,  Occipitalis muscles x6 for 30 units, the cervical paraspinals x4 for 20 units, and finally the trapezius muscles x6 for a total of 30 units. This totalled 155 units of Botox A with 45 units wastage. Patient tolerated the procedure without any ill effects. It is recommended that she keep her head upright for the next 4 hours.       [facility]  OFFICE PROCEDURE PROGRESS NOTE        Chart reviewed for the following:   Marah Bernal MD, have reviewed the History, Physical and updated the Allergic reactions for [employer]     TIME OUT performed immediately prior to start of procedure:   I, Terri Lugo MD, have performed the following reviews on [place] prior to the start of the procedure:            * Patient was identified by name and date of birth   * Agreement on procedure being performed was verified  * Risks and Benefits explained to the patient  * Procedure site verified and marked as necessary  * Patient was positioned for comfort  * Consent was signed and verified     Time: 0 AM      Date of procedure: 7/6/2018    Procedure performed by:  Terri Lugo MD    Provider assisted by: n/a    Patient assisted by:n/a    How tolerated by patient:tolerated    Post Procedural Pain Scale: 2 - Hurts Little Bit    Comments: none

## 2018-07-06 NOTE — PATIENT INSTRUCTIONS
OnabotulinumtoxinA (By injection)   OnabotulinumtoxinA (st-e-gig-ok-HGO-jjr-tox-in-ay)  Treats muscle stiffness, muscle spasms, excessive sweating, overactive bladder, or loss of bladder control. Prevents chronic migraine headaches. Improves the appearance of wrinkles on the face. Brand Name(s): Botox, Botox Cosmetic   There may be other brand names for this medicine. When This Medicine Should Not Be Used: This medicine is not right for everyone. You should not receive this medicine if you had an allergic reaction to onabotulinumtoxinA or any other botulinum toxin product. How to Use This Medicine:   Injectable  · Your doctor will prescribe your exact dose and tell you how often it should be given. This medicine is given by a healthcare provider as a shot under your skin or into a muscle. · You may be given medicine to numb the area where the shot will be injected. If you receive the medicine around your eyes, you may be given eye drops or ointment to numb the area. After your injection, you may need to wear a protective contact lens or eye patch. · If you are being treated for excessive sweating, shave your underarms but do not use deodorant for 24 hours before your injection. Avoid exercise, hot foods or liquids, or anything else that could make you sweat for 30 minutes before your injection. · The recommended treatment schedule for chronic migraine is every 12 weeks. · This medicine works slowly. Once your condition has improved, the medicine will last about 3 months, then the effects will slowly go away. You might need more injections to treat your condition. ¨ Muscle spasms in the eyelids should improve within 3 to 10 days. ¨ Eye muscle problems should improve 1 or 2 days after the injection, and the improvement should last for 2 to 6 weeks. ¨ Neck pain should improve within 2 to 6 weeks. ¨ Arm stiffness should improve within 4 to 6 weeks.   ¨ Facial lines or wrinkles should improve 1 or 2 days.  · This medicine should come with a Medication Guide. Ask your pharmacist for a copy if you do not have one. · Missed dose:Call your doctor or pharmacist for instructions. Drugs and Foods to Avoid:   Ask your doctor or pharmacist before using any other medicine, including over-the-counter medicines, vitamins, and herbal products. · Some foods and medicine can affect how onabotulinumtoxinA works. Tell your doctor if you are using any of the following:  ¨ Aspirin or a blood thinner (such as ticlopidine, warfarin)  ¨ Muscle relaxer  ¨ Medicine for an infection (such as amikacin, gentamicin, streptomycin, tobramycin)  · Tell your doctor if you have received an injection of any botulinum toxin product within the past 4 months. Warnings While Using This Medicine:   · Tell your doctor if you are pregnant or breastfeeding, or if you have breathing or lung problems, bleeding problems, heart or blood vessel disease, or nerve or muscle problems (such as myasthenia gravis). Tell your doctor if you have ever had face surgery or if you have a urinary tract infection or trouble urinating, diabetes, or multiple sclerosis. · This medicine may cause the following problems:  ¨ Muscle weakness, loss of bladder control, trouble swallowing, speaking, or breathing (caused by the toxin spreading to other parts of your body)  · This medicine may make your muscles weak or cause vision problems. Do not drive or do anything else that could be dangerous until you know how this medicine affects you. · There are some warnings that only apply if you are receiving this medicine to treat the following:   ¨ Injections near the eye: This medicine may reduce blinking, which can raise the risk of eye problems such as corneal exposure and ulcers. Tell your doctor right away if you notice that you are blinking less than usual or your eyes feel dry. ¨ Urinary incontinence:  This medicine may cause autonomic dysreflexia, which can be a life-threatening condition. ¨ Overactive bladder: Check with your doctor right away if you have trouble urinating or a burning sensation while urinating. · This medicine contains products from donated human blood, so it may contain viruses, although the risk is low. Human donors and blood are always tested for viruses to keep the risk low. Talk with your doctor about this risk if you are concerned. · Your doctor will check your progress and the effects of this medicine at regular visits. Keep all appointments. Possible Side Effects While Using This Medicine:   Call your doctor right away if you notice any of these side effects:  · Allergic reaction: Itching or hives, swelling in your face or hands, swelling or tingling in your mouth or throat, chest tightness, trouble breathing  · Blurred or double vision, droopy eyelids  · Change in how much or how often you urinate, trouble urinating, or painful urination  · Chest pain, slow or uneven heartbeat  · Headache, increased sweating, warmth or redness in your face, neck, or arm  · Muscle weakness  · Trouble swallowing, talking, or breathing  If you notice these less serious side effects, talk with your doctor:   · Fever, chills, cough, stuffy or runny nose, sore throat, and body aches  · Pain in your neck, back, arms, or legs  · Redness, pain, tenderness, bruising, swelling, or weakness where the shot was given  If you notice other side effects that you think are caused by this medicine, tell your doctor. Call your doctor for medical advice about side effects. You may report side effects to FDA at 4-260-FDA-2070  © 2017 [facility] [place] Information is for End User's use only and may not be sold, redistributed or otherwise used for commercial purposes. The above information is an  only. It is not intended as medical advice for individual conditions or treatments.  Talk to your doctor, nurse or pharmacist before following any medical regimen to see if it is safe and effective for you. Botox injection follow-up. Revisit 6 weeks for next Botox injection. Will prescription sumatriptan injectable in place of the Sumavel which is no longer available. Do not mix it in the same day with Migranal nasal spray. Patient revisit for 3 month Botox encounter.

## 2018-07-06 NOTE — PROGRESS NOTES
6 week follow-up to Botox injection. Neurology Consult      Subjective:      Fred Bradshaw is a 62 y.o. male comes in today 6 weeks after Botox injection process. Says he has experienced a good result and is had no more than 3-5 headaches in the last month lasting a day and tolerable. Is unhappy about the Sumavel which is no longer available so in its place will prescription sumatriptan injectable. Is advised not to take it in the same day as the Migranal nasal spray. There was no downside to the Botox injection process. Does not mention any new medical or surgical history. Says he had Botox previously but could not stay on task with his busy schedule and thinks that is what undermined his headache control at that time. I will see him in 6 weeks for his next scheduled Botox encounter. Good luck. Current Outpatient Prescriptions   Medication Sig Dispense Refill    SUMAtriptan succinate (IMITREX STATDOSE PEN) 6 mg/0.5 mL kit 6 mg by SubCUTAneous route once as needed for Migraine for up to 1 dose. Indications: Migraine 6 Kit 6    onabotulinumtoxinA (BOTOX) 200 unit injection 200 Units by IntraMUSCular route every three (3) months. Indications: MIGRAINE PREVENTION 200 Units 0    dihydroergotamine (MIGRANAL) 0.5 mg/pump act. (4 mg/mL) nasal spray One spray in each nostril and repeat in 15 minutes X 1 . No more then 4 sprays in 24 hours 8 Bottle 3    DULOXETINE HCL (CYMBALTA PO) Take  by mouth daily.  multivitamin (ONE A DAY) tablet Take 1 Tab by mouth daily.  cyanocobalamin (VITAMIN B-12) 500 mcg tablet Take 500 mcg by mouth daily.  VITAMIN A/VITAMIN D2/COD LIVER (VITAMINS A & D PO) Take  by mouth.  CALCIUM CITRATE/VITAMIN D3 (CALCIUM CITRATE + D PO) Take  by mouth.  PROPRANOLOL HCL (INDERAL PO) Take  by mouth. 10 MG NIGHTLY      amitriptyline (ELAVIL) 100 mg tablet Take  by mouth nightly. 2 tabs at bedtime      metFORMIN SR (FORTAMET) tablet Take 500 mg by mouth.  aspirin delayed-release 81 mg tablet Take  by mouth daily.  lansoprazole (PREVACID) 30 mg capsule Take  by mouth two (2) times a day.  metoprolol (LOPRESSOR) 25 mg tablet Take  by mouth two (2) times a day.  lamoTRIgine (LAMICTAL) 100 mg tablet Take  by mouth daily.  metoclopramide (REGLAN) 10 mg tablet Take 10 mg by mouth Before breakfast, lunch, dinner and at bedtime.  SUMAtriptan succinate (ZEMBRACE SYMTOUCH) 3 mg/0.5 mL pnij 1 Syringe by SubCUTAneous route as needed. At headache onset. May repeat again every hour X 4 doses. Max 4 doses in 24 hours.  2 Box 5      No Known Allergies  Past Medical History:   Diagnosis Date    Anxiety     Arrhythmia     SINUS TACHY    Langford's esophagus 3/19/2012    CAD (coronary artery disease)     Chronic pain     Depression     Diabetes mellitus ([facility] [address] 3/19/2012    GERD (gastroesophageal reflux disease)     Headache     Hearing loss     History of MI (myocardial infarction) 3-00    History of peptic ulcer disease 3/19/2012    HTN (hypertension) 3/19/2012    Memory loss     Morbid obesity ([facility] [address] 3/19/2012    2002-OPEN GASTRIC BYPASS    Other ill-defined conditions(799.89)     MIGRAINES    Psychiatric disorder     Reflux 3/19/2012    Sleep apnea, obstructive 3/19/2012    Unspecified adverse effect of anesthesia     HEADACHE    Unspecified sleep apnea     HX-PRIOR TO GASTRIC BYPASS      Past Surgical History:   Procedure Laterality Date    APPENDECTOMY      BIOPSY LIVER  9-3-02    HX APPENDECTOMY      HX CHOLECYSTECTOMY      HX GASTRIC BYPASS  9-3-02    HX HERNIA REPAIR  4.3.2012    Dr. Sonja Clay - laparoscopic incisional hernia repair    HX TONSILLECTOMY      LAP,VAGUS NERVE,TRUNCAL      NEUROLOGICAL PROCEDURE UNLISTED  2004    CERVICAL FUSION      Social History     Social History    Marital status:      Spouse name: N/A    Number of children: N/A    Years of education: N/A     Occupational History    Not on file.     Social History Main Topics    Smoking status: Current Every Day Smoker     Packs/day: 1.00    Smokeless tobacco: Never Used    Alcohol use Yes    Drug use: No    Sexual activity: Not on file     Other Topics Concern    Not on file     Social History Narrative      Family History   Problem Relation Age of Onset    Diabetes Mother     Hypertension Mother     Obesity Mother     Emphysema Mother     Heart Disease Mother     Cancer Father      prostate    Obesity Sister     Other Sister      aneurysm    Alcohol abuse Brother     Headache Brother     Other Brother      aneurysm      Visit Vitals    /64    Pulse 69    Resp 18    Ht 6' 1\" (1.854 m)    Wt 79 kg (174 lb 1.6 oz)    SpO2 98%    BMI 22.97 kg/m2        Review of Systems:   A comprehensive review of systems was negative except for that written in the HPI. Neuro Exam:     Appearance: The patient is well developed, well nourished, provides a coherent history and is in no acute distress. Mental Status: Oriented to time, place and person. Mood and affect appropriate. Cranial Nerves:   Intact visual fields. Fundi are benign. DACIA, EOM's full, no nystagmus, no ptosis. Facial sensation is normal. Corneal reflexes are intact. Facial movement is symmetric. Hearing is normal bilaterally. Palate is midline with normal sternocleidomastoid and trapezius muscles are normal. Tongue is midline. Motor:  5/5 strength in upper and lower proximal and distal muscles. Normal bulk and tone. No fasciculations. Reflexes:   Deep tendon reflexes 2+/4 and symmetrical.   Sensory:   Normal to touch, pinprick and vibration. Gait:  Normal gait. Tremor:   No tremor noted. Cerebellar:  No cerebellar signs present. Neurovascular:  Normal heart sounds and regular rhythm, peripheral pulses intact, and no carotid bruits. Assessment:   6 week follow-up to Botox injection. Please see progress notes.   Does seem to have benefited and I went ahead and scripted sumatriptan injectable for the Sumavel which is no longer available. Understands he is not to mix this injectable with migraine all in the same day. Revisit in 6 weeks for next Botox encounter. Did not mention any new medical or surgical history. Plan:   Revisit 6 weeks.   Signed by :  Laz Castrejon MD

## 2018-07-06 NOTE — MR AVS SNAPSHOT
Tristan Luis Alberto 
 
 
 [facility]  [place] [address] [facility] [address] [id number] [phone] Patient: Sarah Bee MRN: [id number] :1960 Visit Information Date & Time Provider Department Dept. Phone Encounter #  
 2018  9:00 AM Fara Lazo MD [facility] Neurology [facility] [phone] [id number] Follow-up Instructions Return in about 3 months (around 10/6/2018). Follow-up and Disposition History Your Appointments 2018  2:30 PM  
Follow Up with Margie Lyn NP  [facility] ([facility]) Appt Note: follow up Botox/medication evaluation    gustavo   18  
 [place] [address] [address] [facility] [address] [phone]  
  
   
 [place] [address] [address] [address] Upcoming Health Maintenance Date Due Hepatitis C Screening 1960 HEMOGLOBIN A1C Q6M 1960 LIPID PANEL Q1 1960 FOOT EXAM Q1 1970 MICROALBUMIN Q1 1970 EYE EXAM RETINAL OR DILATED Q1 1970 Pneumococcal 19-64 Medium Risk (1 of 1 - PPSV23) 1979 DTaP/Tdap/Td series (1 - Tdap) 1981 FOBT Q 1 YEAR AGE 50-75 2010 Influenza Age 5 to Adult 2018 Allergies as of 2018  Review Complete On: 2018 By: Fara Lazo MD  
 No Known Allergies Current Immunizations  Never Reviewed No immunizations on file. Not reviewed this visit You Were Diagnosed With   
  
 Codes Comments Chronic migraine without aura without status migrainosus, not intractable    -  Primary ICD-10-CM: D50.958 ICD-9-CM: 346.70 Vitals BP Pulse Resp Height(growth percentile) Weight(growth percentile) SpO2  
 120/64 69 18 6' 1\" (1.854 m) 174 lb 1.6 oz (79 kg) 98% BMI Smoking Status 22.97 kg/m2 Current Every Day Smoker Vitals History BMI and BSA Data Body Mass Index Body Surface Area  
 22.97 kg/m 2 2.02 m 2 Preferred Pharmacy Pharmacy Name Phone [facility] [phone] Your Updated Medication List  
  
   
This list is accurate as of 7/6/18  9:53 AM.  Always use your most recent med list.  
  
  
  
  
 amitriptyline 100 mg tablet Commonly known as:  ELAVIL Take  by mouth nightly. 2 tabs at bedtime  
  
 aspirin delayed-release 81 mg tablet Take  by mouth daily. CALCIUM CITRATE + D PO Take  by mouth. CYMBALTA PO Take  by mouth daily. dihydroergotamine 0.5 mg/pump act. (4 mg/mL) nasal spray Commonly known as:  Renetta Zayra One spray in each nostril and repeat in 15 minutes X 1 . No more then 4 sprays in 24 hours FORTAMET 500 mg Generic drug:  metFORMIN SR Take 500 mg by mouth. INDERAL PO Take  by mouth. 10 MG NIGHTLY  
  
 lamoTRIgine 100 mg tablet Commonly known as: LaMICtal  
Take  by mouth daily. metoprolol tartrate 25 mg tablet Commonly known as:  LOPRESSOR Take  by mouth two (2) times a day. multivitamin tablet Commonly known as:  ONE A DAY Take 1 Tab by mouth daily. onabotulinumtoxinA 200 unit injection Commonly known as:  BOTOX  
200 Units by IntraMUSCular route every three (3) months. Indications: MIGRAINE PREVENTION  
  
 PREVACID 30 mg capsule Generic drug:  lansoprazole Take  by mouth two (2) times a day. REGLAN 10 mg tablet Generic drug:  metoclopramide HCl Take 10 mg by mouth Before breakfast, lunch, dinner and at bedtime. * SUMAtriptan succinate 3 mg/0.5 mL Pnij Commonly known as:  Marlena Aubrey  
1 Syringe by SubCUTAneous route as needed. At headache onset. May repeat again every hour X 4 doses. Max 4 doses in 24 hours. * SUMAtriptan succinate 6 mg/0.5 mL kit Commonly known as:  Kayley KENNEDY  
 6 mg by SubCUTAneous route once as needed for Migraine for up to 1 dose. Indications: Migraine VITAMIN B-12 500 mcg tablet Generic drug:  cyanocobalamin Take 500 mcg by mouth daily. VITAMINS A & D PO Take  by mouth. * Notice: This list has 2 medication(s) that are the same as other medications prescribed for you. Read the directions carefully, and ask your doctor or other care provider to review them with you. Prescriptions Sent to Pharmacy Refills SUMAtriptan succinate (IMITREX STATDOSE PEN) 6 mg/0.5 mL kit 6 Si mg by SubCUTAneous route once as needed for Migraine for up to 1 dose. Indications: Migraine Class: Normal  
 [facility] [phone] Route: SubCUTAneous Follow-up Instructions Return in about 3 months (around 10/6/2018). Patient Instructions OnabotulinumtoxinA (By injection) OnabotulinumtoxinA (iw-u-yyq-mo-TFQ-uix-tox-in-ay) Treats muscle stiffness, muscle spasms, excessive sweating, overactive bladder, or loss of bladder control. Prevents chronic migraine headaches. Improves the appearance of wrinkles on the face. Brand Name(s): Botox, Botox Cosmetic There may be other brand names for this medicine. When This Medicine Should Not Be Used: This medicine is not right for everyone. You should not receive this medicine if you had an allergic reaction to onabotulinumtoxinA or any other botulinum toxin product. How to Use This Medicine:  
Injectable · Your doctor will prescribe your exact dose and tell you how often it should be given. This medicine is given by a healthcare provider as a shot under your skin or into a muscle. · You may be given medicine to numb the area where the shot will be injected. If you receive the medicine around your eyes, you may be given eye drops or ointment to numb the area.  After your injection, you may need to wear a protective contact lens or eye patch. · If you are being treated for excessive sweating, shave your underarms but do not use deodorant for 24 hours before your injection. Avoid exercise, hot foods or liquids, or anything else that could make you sweat for 30 minutes before your injection. · The recommended treatment schedule for chronic migraine is every 12 weeks. · This medicine works slowly. Once your condition has improved, the medicine will last about 3 months, then the effects will slowly go away. You might need more injections to treat your condition. ¨ Muscle spasms in the eyelids should improve within 3 to 10 days. ¨ Eye muscle problems should improve 1 or 2 days after the injection, and the improvement should last for 2 to 6 weeks. ¨ Neck pain should improve within 2 to 6 weeks. ¨ Arm stiffness should improve within 4 to 6 weeks. ¨ Facial lines or wrinkles should improve 1 or 2 days. · This medicine should come with a Medication Guide. Ask your pharmacist for a copy if you do not have one. · Missed dose:Call your doctor or pharmacist for instructions. Drugs and Foods to Avoid: Ask your doctor or pharmacist before using any other medicine, including over-the-counter medicines, vitamins, and herbal products. · Some foods and medicine can affect how onabotulinumtoxinA works. Tell your doctor if you are using any of the following: ¨ Aspirin or a blood thinner (such as ticlopidine, warfarin) ¨ Muscle relaxer ¨ Medicine for an infection (such as amikacin, gentamicin, streptomycin, tobramycin) · Tell your doctor if you have received an injection of any botulinum toxin product within the past 4 months. Warnings While Using This Medicine: · Tell your doctor if you are pregnant or breastfeeding, or if you have breathing or lung problems, bleeding problems, heart or blood vessel disease, or nerve or muscle problems (such as myasthenia gravis).  Tell your doctor if you have ever had face surgery or if you have a urinary tract infection or trouble urinating, diabetes, or multiple sclerosis. · This medicine may cause the following problems: ¨ Muscle weakness, loss of bladder control, trouble swallowing, speaking, or breathing (caused by the toxin spreading to other parts of your body) · This medicine may make your muscles weak or cause vision problems. Do not drive or do anything else that could be dangerous until you know how this medicine affects you. · There are some warnings that only apply if you are receiving this medicine to treat the following: ¨ Injections near the eye: This medicine may reduce blinking, which can raise the risk of eye problems such as corneal exposure and ulcers. Tell your doctor right away if you notice that you are blinking less than usual or your eyes feel dry. ¨ Urinary incontinence: This medicine may cause autonomic dysreflexia, which can be a life-threatening condition. ¨ Overactive bladder: Check with your doctor right away if you have trouble urinating or a burning sensation while urinating. · This medicine contains products from donated human blood, so it may contain viruses, although the risk is low. Human donors and blood are always tested for viruses to keep the risk low. Talk with your doctor about this risk if you are concerned. · Your doctor will check your progress and the effects of this medicine at regular visits. Keep all appointments. Possible Side Effects While Using This Medicine:  
Call your doctor right away if you notice any of these side effects: · Allergic reaction: Itching or hives, swelling in your face or hands, swelling or tingling in your mouth or throat, chest tightness, trouble breathing · Blurred or double vision, droopy eyelids · Change in how much or how often you urinate, trouble urinating, or painful urination · Chest pain, slow or uneven heartbeat · Headache, increased sweating, warmth or redness in your face, neck, or arm · Muscle weakness · Trouble swallowing, talking, or breathing If you notice these less serious side effects, talk with your doctor: · Fever, chills, cough, stuffy or runny nose, sore throat, and body aches · Pain in your neck, back, arms, or legs · Redness, pain, tenderness, bruising, swelling, or weakness where the shot was given If you notice other side effects that you think are caused by this medicine, tell your doctor. Call your doctor for medical advice about side effects. You may report side effects to FDA at 1-437-EJV-6632 © 2017 [facility] Information is for End User's use only and may not be sold, redistributed or otherwise used for commercial purposes. The above information is an  only. It is not intended as medical advice for individual conditions or treatments. Talk to your doctor, nurse or pharmacist before following any medical regimen to see if it is safe and effective for you. Botox injection follow-up. Revisit 6 weeks for next Botox injection. Will prescription sumatriptan injectable in place of the Sumavel which is no longer available. Do not mix it in the same day with Migranal nasal spray. Patient revisit for 3 month Botox encounter. Patient Instructions History Introducing \[facility] [place] [facility]\"" & HEALTH SERVICES! [employer] introduces Spotivate patient portal. Now you can access parts of your medical record, email your doctor's office, and request medication refills online. 1. In your internet browser, go to [email or web address] 2. Click on the First Time User? Click Here link in the Sign In box. You will see the New Member Sign Up page. 3. Enter your Spotivate Access Code exactly as it appears below. You will not need to use this code after youve completed the sign-up process. If you do not sign up before the expiration date, you must request a new code. · Psonar Access Code: [email or web address] Expires: 10/4/2018  9:27 AM 
 
4. Enter the last four digits of your Social Security Number (xxxx) and Date of Birth (mm/dd/yyyy) as indicated and click Submit. You will be taken to the next sign-up page. 5. Create a Psonar ID. This will be your Psonar login ID and cannot be changed, so think of one that is secure and easy to remember. 6. Create a Psonar password. You can change your password at any time. 7. Enter your Password Reset Question and Answer. This can be used at a later time if you forget your password. 8. Enter your e-mail address. You will receive e-mail notification when new information is available in [address] 9. Click Sign Up. You can now view and download portions of your medical record. 10. Click the Download Summary menu link to download a portable copy of your medical information. If you have questions, please visit the Frequently Asked Questions section of the Psonar website. Remember, Psonar is NOT to be used for urgent needs. For medical emergencies, dial 911. Now available from your iPhone and Android! Please provide this summary of care documentation to your next provider. Your primary care clinician is listed as Mitchell Del Rio. If you have any questions after today's visit, please call [phone].

## 2018-07-06 NOTE — PROGRESS NOTES
Botox Procedure  Pain scale prior to procedure   0/10  Pain scale post procedure    0 /10  Patient states 4-5  headaches a month & lasting  4-48  hrs  Been treated for headaches greater than 6 months  Consent signed     Instructions post injection discussed with patient   1. Do not lay flat for 4 hrs  2. Do not press on injection sites up to 24 hrs.         What to expect  Possible bleeding and/or swelling  at injection sites      Patient verbalizes understanding

## 2018-07-31 ENCOUNTER — OFFICE VISIT (OUTPATIENT)
Dept: NEUROLOGY | Age: 58
End: 2018-07-31

## 2018-07-31 VITALS
HEIGHT: 73 IN | SYSTOLIC BLOOD PRESSURE: 110 MMHG | BODY MASS INDEX: 23.06 KG/M2 | DIASTOLIC BLOOD PRESSURE: 70 MMHG | WEIGHT: 174 LBS

## 2018-07-31 DIAGNOSIS — G43.909 MIGRAINE WITHOUT STATUS MIGRAINOSUS, NOT INTRACTABLE, UNSPECIFIED MIGRAINE TYPE: Primary | Chronic | ICD-10-CM

## 2018-07-31 NOTE — MR AVS SNAPSHOT
Haider Metzger  [address] [place] [id number] [phone] Patient: Farhad Stewart MRN: [id number] :1960 Visit Information Date & Time Provider Department Dept. Phone Encounter #  
 2018  2:30 PM Chiki Holden NP [facility] Neurology [facility] [phone] [id number] Follow-up Instructions Return after botox. Upcoming Health Maintenance Date Due Hepatitis C Screening 1960 HEMOGLOBIN A1C Q6M 1960 LIPID PANEL Q1 1960 FOOT EXAM Q1 1970 MICROALBUMIN Q1 1970 EYE EXAM RETINAL OR DILATED Q1 1970 Pneumococcal 19-64 Medium Risk (1 of 1 - PPSV23) 1979 DTaP/Tdap/Td series (1 - Tdap) 1981 FOBT Q 1 YEAR AGE 50-75 2010 MEDICARE YEARLY EXAM 2018 Influenza Age 5 to Adult 2018 Allergies as of 2018  Review Complete On: 2018 By: Cammy Mendez No Known Allergies Current Immunizations  Never Reviewed No immunizations on file. Not reviewed this visit You Were Diagnosed With   
  
 Codes Comments Migraine without status migrainosus, not intractable, unspecified migraine type    -  Primary ICD-10-CM: K42.868 ICD-9-CM: 346.90 Vitals BP Height(growth percentile) Weight(growth percentile) BMI Smoking Status 110/70 6' 1\" (1.854 m) 174 lb (78.9 kg) 22.96 kg/m2 Current Every Day Smoker BMI and BSA Data Body Mass Index Body Surface Area  
 22.96 kg/m 2 2.02 m 2 Preferred Pharmacy [facility]acy Name Phone [phone] [phone] Your Updated Medication List  
  
   
This list is accurate as of 18  3:14 PM.  Always use your most recent med list.  
  
  
  
  
 amitriptyline 100 mg tablet Commonly known as:  ELAVIL Take  by mouth nightly. 2 tabs at bedtime  
  
 aspirin delayed-release 81 mg tablet Take  by mouth daily. CALCIUM CITRATE + D PO Take  by mouth. CYMBALTA PO Take  by mouth daily. dihydroergotamine 0.5 mg/pump act. (4 mg/mL) nasal spray Commonly known as:  Christ Newberry One spray in each nostril and repeat in 15 minutes X 1 . No more then 4 sprays in 24 hours FORTAMET 500 mg Generic drug:  metFORMIN SR Take 500 mg by mouth. INDERAL PO Take  by mouth. 10 MG NIGHTLY  
  
 lamoTRIgine 100 mg tablet Commonly known as: LaMICtal  
Take  by mouth daily. metoprolol tartrate 25 mg tablet Commonly known as:  LOPRESSOR Take  by mouth two (2) times a day. multivitamin tablet Commonly known as:  ONE A DAY Take 1 Tab by mouth daily. onabotulinumtoxinA 200 unit injection Commonly known as:  BOTOX  
200 Units by IntraMUSCular route every three (3) months. Indications: MIGRAINE PREVENTION  
  
 PREVACID 30 mg capsule Generic drug:  lansoprazole Take  by mouth two (2) times a day. REGLAN 10 mg tablet Generic drug:  metoclopramide HCl Take 10 mg by mouth Before breakfast, lunch, dinner and at bedtime. SUMAtriptan succinate 3 mg/0.5 mL Pnij Commonly known as:  Oksana Escobedo  
1 Syringe by SubCUTAneous route as needed. At headache onset. May repeat again every hour X 4 doses. Max 4 doses in 24 hours. VITAMIN B-12 500 mcg tablet Generic drug:  cyanocobalamin Take 500 mcg by mouth daily. VITAMINS A & D PO Take  by mouth. Prescriptions Sent to Pharmacy Refills SUMAtriptan succinate (ZEMBRACE SYMTOUCH) 3 mg/0.5 mL pnij 5 Si Syringe by SubCUTAneous route as needed. At headache onset. May repeat again every hour X 4 doses. Max 4 doses in 24 hours. Class: Normal  
 [facility] Ph #: [phone] Route: SubCUTAneous Follow-up Instructions Return after botox. Patient Instructions PRESCRIPTION REFILL POLICY [facility] Statement to Patients April 1, 2014 In an effort to ensure the large volume of patient prescription refills is processed in the most efficient and expeditious manner, we are asking our patients to assist us by calling your Pharmacy for all prescription refills, this will include also your  Mail Order Pharmacy. The pharmacy will contact our office electronically to continue the refill process. Please do not wait until the last minute to call your pharmacy. We need at least 48 hours (2days) to fill prescriptions. We also encourage you to call your pharmacy before going to  your prescription to make sure it is ready. With regard to controlled substance prescription refill requests (narcotic refills) that need to be picked up at our office, we ask your cooperation by providing us with at least 72 hours (3days) notice that you will need a refill. We will not refill narcotic prescription refill requests after 4:00pm on any weekday, Monday through Thursday, or after 2:00pm on Fridays, or on the weekends. We encourage everyone to explore another way of getting your prescription refill request processed using [employer], our patient web portal through our electronic medical record system. [employer] is an efficient and effective way to communicate your medication request directly to the office and  downloadable as an uriah on your smart phone . [employer] also features a review functionality that allows you to view your medication list as well as leave messages for your physician. Are you ready to get connected? If so please review the attatched instructions or speak to any of our staff to get you set up right away! Thank you so much for your cooperation. Should you have any questions please contact our Practice Administrator. The Physicians and Staff,  [facility] Introducing [facility]! [employer] introduces NetDocuments patient portal. Now you can access parts of your medical record, email your doctor's office, and request medication refills online. 1. In your internet browser, go to [email or web address] 2. Click on the First Time User? Click Here link in the Sign In box. You will see the New Member Sign Up page. 3. Enter your NetDocuments Access Code exactly as it appears below. You will not need to use this code after youve completed the sign-up process. If you do not sign up before the expiration date, you must request a new code. · NetDocuments Access Code: [email or web address] Expires: 10/4/2018  9:27 AM 
 
4. Enter the last four digits of your Social Security Number (xxxx) and Date of Birth (mm/dd/yyyy) as indicated and click Submit. You will be taken to the next sign-up page. 5. Create a NetDocuments ID. This will be your NetDocuments login ID and cannot be changed, so think of one that is secure and easy to remember. 6. Create a NetDocuments password. You can change your password at any time. 7. Enter your Password Reset Question and Answer. This can be used at a later time if you forget your password. 8. Enter your e-mail address. You will receive e-mail notification when new information is available in [address] 9. Click Sign Up. You can now view and download portions of your medical record. 10. Click the Download Summary menu link to download a portable copy of your medical information. If you have questions, please visit the Frequently Asked Questions section of the NetDocuments website. Remember, NetDocuments is NOT to be used for urgent needs. For medical emergencies, dial 911. Now available from your iPhone and Android! Please provide this summary of care documentation to your next provider. Your primary care clinician is listed as Karena Collazo. If you have any questions after today's visit, please call [phone].

## 2018-07-31 NOTE — PATIENT INSTRUCTIONS
[facility]   Statement to Patients  April 1, 2014      In an effort to ensure the large volume of patient prescription refills is processed in the most efficient and expeditious manner, we are asking our patients to assist us by calling your Pharmacy for all prescription refills, this will include also your  Mail Order Pharmacy. The pharmacy will contact our office electronically to continue the refill process. Please do not wait until the last minute to call your pharmacy. We need at least 48 hours (2days) to fill prescriptions. We also encourage you to call your pharmacy before going to  your prescription to make sure it is ready. With regard to controlled substance prescription refill requests (narcotic refills) that need to be picked up at our office, we ask your cooperation by providing us with at least 72 hours (3days) notice that you will need a refill. We will not refill narcotic prescription refill requests after 4:00pm on any weekday, Monday through Thursday, or after 2:00pm on Fridays, or on the weekends. We encourage everyone to explore another way of getting your prescription refill request processed using [employer], our patient web portal through our electronic medical record system. [employer] is an efficient and effective way to communicate your medication request directly to the office and  downloadable as an uriah on your smart phone . [employer] also features a review functionality that allows you to view your medication list as well as leave messages for your physician. Are you ready to get connected? If so please review the attatched instructions or speak to any of our staff to get you set up right away! Thank you so much for your cooperation. Should you have any questions please contact our Practice Administrator.     The Physicians and Staff,  [facility]

## 2018-07-31 NOTE — PROGRESS NOTES
Betzy Allison is a 62 y.o. male who presents with the following  Chief Complaint   Patient presents with    Follow-up       HPI  Patient comes in for a follow up for migraines. He was on a botox schedule and doing well every 3 months but had some health concerns that came up so he got off track and his last botox was in early July. On botox he had about 3-4 migraines a month and recently before this previous botox he was having about 20 migrianes a month lasting days. He states that his migraines are usually located on the front right side of his head and it feels like an exploding pulsating pain.  He has associated light sensitivity, nausea, and vomiting.  He has tried relpax and percocet from his primary care doctor to help with abortive therapy and he states this does not help at all. He is currently on Elavil and Inderal. Tried Topamax. Also on Lamictal. Sumavel caused pain and a injection reaction so he has not used this. Imitrex 6 mg injections caused reaction also. Has been using Zembrace 3 mg and these have been working well for abortive therapy in about 30 minutes. Currently off botox he is getting about 20 headaches a month lasting all day. He has received his first botox now again and only been having about 4-5 migraines so far since the botox was given. No Known Allergies    Current Outpatient Prescriptions   Medication Sig    SUMAtriptan succinate (ZEMBRACE SYMTOUCH) 3 mg/0.5 mL pnij 1 Syringe by SubCUTAneous route as needed. At headache onset. May repeat again every hour X 4 doses. Max 4 doses in 24 hours.  onabotulinumtoxinA (BOTOX) 200 unit injection 200 Units by IntraMUSCular route every three (3) months. Indications: MIGRAINE PREVENTION    dihydroergotamine (MIGRANAL) 0.5 mg/pump act. (4 mg/mL) nasal spray One spray in each nostril and repeat in 15 minutes X 1 . No more then 4 sprays in 24 hours    DULOXETINE HCL (CYMBALTA PO) Take  by mouth daily.       multivitamin (ONE A DAY) tablet Take 1 Tab by mouth daily.  cyanocobalamin (VITAMIN B-12) 500 mcg tablet Take 500 mcg by mouth daily.  VITAMIN A/VITAMIN D2/COD LIVER (VITAMINS A & D PO) Take  by mouth.  CALCIUM CITRATE/VITAMIN D3 (CALCIUM CITRATE + D PO) Take  by mouth.  PROPRANOLOL HCL (INDERAL PO) Take  by mouth. 10 MG NIGHTLY    amitriptyline (ELAVIL) 100 mg tablet Take  by mouth nightly. 2 tabs at bedtime    metFORMIN SR (FORTAMET) tablet Take 500 mg by mouth.  aspirin delayed-release 81 mg tablet Take  by mouth daily.  lansoprazole (PREVACID) 30 mg capsule Take  by mouth two (2) times a day.  metoprolol (LOPRESSOR) 25 mg tablet Take  by mouth two (2) times a day.  lamoTRIgine (LAMICTAL) 100 mg tablet Take  by mouth daily.  metoclopramide (REGLAN) 10 mg tablet Take 10 mg by mouth Before breakfast, lunch, dinner and at bedtime. No current facility-administered medications for this visit.         History   Smoking Status    Current Every Day Smoker    Packs/day: 1.00   Smokeless Tobacco    Never Used       Past Medical History:   Diagnosis Date    Anxiety     Arrhythmia     SINUS TACHY    Langford's esophagus 3/19/2012    CAD (coronary artery disease)     Chronic pain     Depression     Diabetes mellitus ([address] 3/19/2012    GERD (gastroesophageal reflux disease)     Headache     Hearing loss     History of MI (myocardial infarction) 3-00    History of peptic ulcer disease 3/19/2012    HTN (hypertension) 3/19/2012    Memory loss     Morbid obesity ([facility] [address] 3/19/2012    2002-OPEN GASTRIC BYPASS    Other ill-defined conditions(799.89)     MIGRAINES    Psychiatric disorder     Reflux 3/19/2012    Sleep apnea, obstructive 3/19/2012    Unspecified adverse effect of anesthesia     HEADACHE    Unspecified sleep apnea     HX-PRIOR TO GASTRIC BYPASS       Past Surgical History:   Procedure Laterality Date    APPENDECTOMY      BIOPSY LIVER  9-3-02    HX APPENDECTOMY      HX CHOLECYSTECTOMY  HX GASTRIC BYPASS  9-3-02    HX HERNIA REPAIR  4.3.2012    Dr. Yamileth Crowder - laparoscopic incisional hernia repair    HX TONSILLECTOMY      LAP,VAGUS NERVE,TRUNCAL      NEUROLOGICAL PROCEDURE UNLISTED  2004    CERVICAL FUSION       Family History   Problem Relation Age of Onset    Diabetes Mother     Hypertension Mother     Obesity Mother     Emphysema Mother     Heart Disease Mother     Cancer Father      prostate    Obesity Sister     Other Sister      aneurysm    Alcohol abuse Brother     Headache Brother     Other Brother      aneurysm       Social History     Social History    Marital status:      Spouse name: N/A    Number of children: N/A    Years of education: N/A     Social History Main Topics    Smoking status: Current Every Day Smoker     Packs/day: 1.00    Smokeless tobacco: Never Used    Alcohol use Yes    Drug use: No    Sexual activity: Not Asked     Other Topics Concern    None     Social History Narrative       Review of Systems   Eyes: Positive for blurred vision and photophobia. Negative for double vision. Gastrointestinal: Positive for nausea. Negative for vomiting. Neurological: Positive for dizziness and headaches. Negative for tingling, tremors, seizures and loss of consciousness. Remainder of comprehensive review is negative. Physical Exam :    Visit Vitals    /70    Ht 6' 1\" (1.854 m)    Wt 78.9 kg (174 lb)    BMI 22.96 kg/m2       General: Well defined, nourished, and groomed individual in no acute distress.    Neck: Supple, nontender, no bruits, no pain with resistance to active range of motion.    Heart: Regular rate and rhythm, no murmurs, rub, or gallop. Normal S1S2.   Lungs: Clear to auscultation bilaterally with equal chest expansion, no cough, no wheeze  Musculoskeletal: Extremities revealed no edema and had full range of motion of joints.    Psych: Good mood and bright affect    NEUROLOGICAL EXAMINATION:    Mental Status: Alert and oriented to person, place, and time    Cranial Nerves:    II, III, IV, VI: Visual acuity grossly intact. Visual fields are normal.    Pupils are equal, round, and reactive to light and accommodation.    Extra-ocular movements are full and fluid. Fundoscopic exam was benign, no ptosis or nystagmus.    V-XII: Hearing is grossly intact. Facial features are symmetric, with normal sensation and strength. The palate rises symmetrically and the tongue protrudes midline. Sternocleidomastoids 5/5. Motor Examination: Normal tone, bulk, and strength, 5/5 muscle strength throughout. Coordination: Finger to nose was normal. No resting or intention tremor    Gait and Station: Steady while walking. Normal arm swing. No pronator drift. No muscle wasting or fasiculations noted. Reflexes: DTRs 2+ throughout. Results for orders placed or performed during the hospital encounter of 12   GLUCOSE, POC   Result Value Ref Range    Glucose (POC) 97 75 - 110 mg/dL    Performed by Douglas SHAYARETDmitriy    GLUCOSE, POC   Result Value Ref Range    Glucose (POC) 111 (H) 75 - 110 mg/dL    Performed by JASMINA ROBERSON    GLUCOSE, POC   Result Value Ref Range    Glucose (POC) 118 (H) 75 - 110 mg/dL    Performed by OCHOA BURK    GLUCOSE, POC   Result Value Ref Range    Glucose (POC) 124 (H) 75 - 110 mg/dL    Performed by OCHOA BURK    GLUCOSE, POC   Result Value Ref Range    Glucose (POC) 133 (H) 75 - 110 mg/dL    Performed by [facility]    GLUCOSE, POC   Result Value Ref Range    Glucose (POC) 166 (H) 75 - 110 mg/dL    Performed by MARK SZYMANSKI        Orders Placed This Encounter    SUMAtriptan succinate (ZEMBRACE SYMTOUCH) 3 mg/0.5 mL pnij     Si Syringe by SubCUTAneous route as needed. At headache onset. May repeat again every hour X 4 doses. Max 4 doses in 24 hours. Dispense:  8 Syringe     Refill:  5       1.  Migraine without status migrainosus, not intractable, unspecified migraine type        Follow-up Disposition:  Return after botox. Migraines significantly better post botox # 1 again. He had health concerns so was off schedule but now since getting the botox earlier this month has dropped from 20 a month to just 5 since the botox treatment this month. Significant drop. We discussed even seeing better results after the 2nd botox and if he can stay healthy and keep on every 3 months he will notice things get even better. He will keep Zembrace for abortive therapy as has tried imitrex tablets, maxalt, relpax, Sumavel, Imitrex 6 mg injection and had reactions or they did not work. Oneil Base works great with no reaction. Continue to follow migraines and call with changes. Follow after botox # 2.          This note will not be viewable in [employer]

## 2018-07-31 NOTE — PROGRESS NOTES
Patient is here for follow up. Was given zembrace injections and they help a lot. Maybe 1 less headache since botox. Not helping as much now. Has been seeing PCP and cardiologist for shortness of breath and dizziness when walking. They are not sure what is causing it yet.

## 2019-03-26 ENCOUNTER — TELEPHONE (OUTPATIENT)
Dept: NEUROLOGY | Age: 59
End: 2019-03-26

## 2019-03-26 NOTE — TELEPHONE ENCOUNTER
----- Message from Lani Mascorro sent at 3/26/2019 12:53 PM EDT -----  Regarding: Dr. Katey Arnold  Pt's wife(Yudy Parham) would like a call to schedule pt's Botox appt. Best contact number [phone].

## 2019-03-28 ENCOUNTER — OFFICE VISIT (OUTPATIENT)
Dept: NEUROLOGY | Age: 59
End: 2019-03-28

## 2019-03-28 VITALS
WEIGHT: 166 LBS | DIASTOLIC BLOOD PRESSURE: 56 MMHG | RESPIRATION RATE: 16 BRPM | HEART RATE: 67 BPM | SYSTOLIC BLOOD PRESSURE: 110 MMHG | BODY MASS INDEX: 22 KG/M2 | HEIGHT: 73 IN | OXYGEN SATURATION: 96 %

## 2019-03-28 DIAGNOSIS — G43.909 MIGRAINE WITHOUT STATUS MIGRAINOSUS, NOT INTRACTABLE, UNSPECIFIED MIGRAINE TYPE: Chronic | ICD-10-CM

## 2019-03-28 DIAGNOSIS — G43.709 CHRONIC MIGRAINE WITHOUT AURA WITHOUT STATUS MIGRAINOSUS, NOT INTRACTABLE: Primary | ICD-10-CM

## 2019-03-28 RX ORDER — DIHYDROERGOTAMINE MESYLATE 4 MG/ML
1 SPRAY NASAL AS NEEDED
Qty: 3 BOTTLE | Refills: 0 | Status: SHIPPED | COMMUNITY
Start: 2019-03-28 | End: 2019-11-12 | Stop reason: SDUPTHER

## 2019-03-28 RX ORDER — OXYCODONE AND ACETAMINOPHEN 10; 325 MG/1; MG/1
1 TABLET ORAL
COMMUNITY
Start: 2018-07-01

## 2019-03-28 RX ORDER — AMOXICILLIN 500 MG/1
CAPSULE ORAL
Refills: 0 | COMMUNITY
Start: 2019-03-18 | End: 2019-03-28 | Stop reason: ALTCHOICE

## 2019-03-28 NOTE — PROGRESS NOTES
Chief Complaint Patient presents with  Migraine f/u, botox eval.  HA controlled better with Zembrace but with deductible, $100 per shot which is too expensive Samples of Migranal given per VO Dr. Agueda Gutierrez Coordination of Care Questions 1. Have you been to the ER, urgent care clinic outside of [facility] since your last visit? No  
    Hospitalized since your last visit? No 
 
2. Have you seen or consulted any other health care providers outside of the [address] [place] [address] since your last visit? Include any pap smears or colon screening.  No

## 2019-03-29 NOTE — PROGRESS NOTES
[facility] and [address] at [facility] at [facility] [address] [place], [address] [address], [address] [place] [address]  
[phone] Chief Complaint Patient presents with  Migraine f/u, botox eval.  HA controlled better with Zembrace but with deductible, $100 per shot which is too expensive Current Outpatient Medications Medication Sig Dispense Refill  oxyCODONE-acetaminophen (PERCOCET 10)  mg per tablet Take 1 Tab by mouth.  SUMAtriptan succinate (ZEMBRACE SYMTOUCH) 3 mg/0.5 mL pnij 1 Syringe by SubCUTAneous route as needed (migraine). At headache onset. May repeat again every hour X 4 doses. Max 4 doses in 24 hours. 8 Syringe 5  
 dihydroergotamine (MIGRANAL) 0.5 mg/pump act. (4 mg/mL) nasal spray 1 Spray by Nasal route as needed for Migraine. use in one nostril as directed. No more than 4 sprays in one hour 3 Bottle 0  
 onabotulinumtoxinA (BOTOX) 200 unit injection 200 Units by IntraMUSCular route every three (3) months. Indications: MIGRAINE PREVENTION 200 Units 0  
 dihydroergotamine (MIGRANAL) 0.5 mg/pump act. (4 mg/mL) nasal spray One spray in each nostril and repeat in 15 minutes X 1 . No more then 4 sprays in 24 hours 8 Bottle 3  
 DULoxetine (CYMBALTA) 30 mg capsule Take 1 Cap by mouth daily.  multivitamin (ONE A DAY) tablet Take 1 Tab by mouth daily.  cyanocobalamin (VITAMIN B-12) 500 mcg tablet Take 500 mcg by mouth daily.  VITAMIN A/VITAMIN D2/COD LIVER (VITAMINS A & D PO) Take  by mouth.  CALCIUM CITRATE/VITAMIN D3 (CALCIUM CITRATE + D PO) Take  by mouth.  PROPRANOLOL HCL (INDERAL PO) Take  by mouth. 10 MG NIGHTLY  aspirin delayed-release 81 mg tablet Take  by mouth daily.  lansoprazole (PREVACID) 30 mg capsule Take  by mouth two (2) times a day.  metoprolol (LOPRESSOR) 25 mg tablet Take  by mouth two (2) times a day.  lamoTRIgine (LAMICTAL) 100 mg tablet Take  by mouth daily.  metoclopramide (REGLAN) 10 mg tablet Take 10 mg by mouth Before breakfast, lunch, dinner and at bedtime. No Known Allergies Social History Tobacco Use  Smoking status: Current Every Day Smoker Packs/day: 1.00  Smokeless tobacco: Never Used Substance Use Topics  Alcohol use: Yes  Drug use: No  
 
Patient returns today for follow-up intractable migraine headaches. He previously was getting Botox for the prevention of chronic migraine and had a significant reduction in his headache frequency. He went from 20 headache days per month down to 4-5 headache days per month with the Botox. He is gotten out of the Botox regimen. Headaches have returned back up to 15-20 headache days per month. For abortive therapy he has been using the Zembrace injection with very successful results although he right now is in a situation where he has not yet met his deductible so each injection is costing him $100. He also uses Migranal which is less effective but also less expensive. No medication side effects. He is here today to discuss reinstituting Botox for the prevention of chronic migraine. Again headache frequency at this point is 15-20/month each lasting greater than 4 hours and this pattern has been present for greater than 6 months now. Review of the electronic medical record finds he has had previous preventative medications including the try cyclic antidepressant Elavil as well as beta-blocker Inderal.  He is been on antiepileptic drugs Topamax and Lamictal.  Terms of abortive therapy he has gotten great success with sumatriptan injection although the 6 mg injection caused him significant reaction both in the needle and needle less forms while the 3 mg Sumavel preparation has given him good relief without significant side effect. Examination Visit Vitals /56 (BP 1 Location: Left arm, BP Patient Position: Sitting) Pulse 67 Resp 16 Ht 6' 1\" (1.854 m) Wt 75.3 kg (166 lb) SpO2 96% BMI 21.90 kg/m² Awake, alert and oriented. No icterus. CN intact 2-12 without nystagmus. No pronation or drift. Resists fully in all 4 extrems. DTR symmetric in all 4 extremities. No ataxia. Steady gait. Impression/Plan Chronic migraine headache with good response previously to Botox now off of botulinum toxin with recurrence of headaches with a pattern that meets criteria for the use of Botox as indicated by the FDA for prevention of chronic migraine. He certainly is satisfied those criteria. Given that we will proceed with arranging for Dr. Eleuterio Wu to reinstitute Botox therapy. Continue with 3 mg Sumavel injections as this is been successful for him with no side effects versus the 6 mg size. DHE when he is unable to afford the Sumavel. Track headaches on a calendar bring that each appointment. Today we discussed at length migraine, migraine pathophysiology, migraine treatments including preventative and abortive and his questions and concerns in those regards answered today at length He will follow-up in 8 weeks after his Botox injections to gauge efficacy Total time: 30 min Counseling / coordination time: 20 min  
> 50% counseling / coordination?: Yes re: as documented above Samuel Calhoun MD 
 
 
This note was created using voice recognition software. Despite editing, there may be syntax errors. This note will not be viewable in [address]

## 2019-04-05 ENCOUNTER — TELEPHONE (OUTPATIENT)
Dept: NEUROLOGY | Age: 59
End: 2019-04-05

## 2019-04-05 NOTE — TELEPHONE ENCOUNTER
Reached out to Gabriel to confirm if PA was on file for BOTOX. Last request was from 2017. He advised that for BOTOX that a form needs to be completed. He faxed over document to me. I have completed what I could and have place the rest in provider folder. Please complete and return to Niharika Car or myself so we can fax to [place]. RX and referral are attached in folder.

## 2019-04-15 ENCOUNTER — TELEPHONE (OUTPATIENT)
Dept: NEUROLOGY | Age: 59
End: 2019-04-15

## 2019-04-15 NOTE — TELEPHONE ENCOUNTER
----- Message from Marco A Mart sent at 4/15/2019 11:51 AM EDT -----  Regarding: Dr. Cooper Goodell  Pt's wife(Mrs. Brandon Mckay) would like a call to schedule an appt for Botox . Best contact number [phone].

## 2019-07-08 RX ORDER — CEFUROXIME AXETIL 250 MG/1
TABLET ORAL
Qty: 4 KIT | Refills: 0 | Status: SHIPPED | OUTPATIENT
Start: 2019-07-08 | End: 2019-08-14 | Stop reason: SDUPTHER

## 2019-08-13 ENCOUNTER — HOSPITAL ENCOUNTER (OUTPATIENT)
Dept: MRI IMAGING | Age: 59
Discharge: HOME | End: 2019-08-13
Attending: ORTHOPAEDIC SURGERY
Payer: MEDICARE

## 2019-08-13 DIAGNOSIS — M48.02 CERVICAL SPINAL STENOSIS: ICD-10-CM

## 2019-08-13 PROCEDURE — 72141 MRI NECK SPINE W/O DYE: CPT

## 2019-10-23 ENCOUNTER — TELEPHONE (OUTPATIENT)
Dept: NEUROLOGY | Age: 59
End: 2019-10-23

## 2019-10-23 RX ORDER — CEFUROXIME AXETIL 250 MG/1
TABLET ORAL
Qty: 9 KIT | Refills: 6 | Status: SHIPPED | OUTPATIENT
Start: 2019-10-23 | End: 2019-11-12 | Stop reason: SDUPTHER

## 2019-11-04 ENCOUNTER — HOSPITAL ENCOUNTER (EMERGENCY)
Age: 59
Discharge: HOME | End: 2019-11-04
Attending: EMERGENCY MEDICINE
Payer: MEDICARE

## 2019-11-04 ENCOUNTER — APPOINTMENT (OUTPATIENT)
Dept: GENERAL RADIOLOGY | Age: 59
End: 2019-11-04
Attending: EMERGENCY MEDICINE
Payer: MEDICARE

## 2019-11-04 VITALS
DIASTOLIC BLOOD PRESSURE: 83 MMHG | HEART RATE: 78 BPM | WEIGHT: 165 LBS | BODY MASS INDEX: 22.35 KG/M2 | HEIGHT: 72 IN | TEMPERATURE: 98.3 F | OXYGEN SATURATION: 95 % | RESPIRATION RATE: 96 BRPM | SYSTOLIC BLOOD PRESSURE: 164 MMHG

## 2019-11-04 DIAGNOSIS — S81.811A LACERATION OF RIGHT LOWER EXTREMITY, INITIAL ENCOUNTER: Primary | ICD-10-CM

## 2019-11-04 PROCEDURE — 74011000250 HC RX REV CODE- 250: Performed by: EMERGENCY MEDICINE

## 2019-11-04 PROCEDURE — 99283 EMERGENCY DEPT VISIT LOW MDM: CPT

## 2019-11-04 PROCEDURE — 75810000293 HC SIMP/SUPERF WND  RPR

## 2019-11-04 PROCEDURE — 73590 X-RAY EXAM OF LOWER LEG: CPT

## 2019-11-04 RX ORDER — LIDOCAINE HYDROCHLORIDE AND EPINEPHRINE 10; 10 MG/ML; UG/ML
10 INJECTION, SOLUTION INFILTRATION; PERINEURAL ONCE
Status: COMPLETED | OUTPATIENT
Start: 2019-11-04 | End: 2019-11-04

## 2019-11-04 RX ADMIN — LIDOCAINE HYDROCHLORIDE,EPINEPHRINE BITARTRATE 100 MG: 10; .01 INJECTION, SOLUTION INFILTRATION; PERINEURAL at 03:58

## 2019-11-04 RX ADMIN — BACITRACIN, NEOMYCIN, POLYMYXIN B 1 PACKET: 400; 3.5; 5 OINTMENT TOPICAL at 03:59

## 2019-11-04 NOTE — ED TRIAGE NOTES
Pt ambulatory to triage, c/o Lac to R shin. States that approx 2 hours ago, was working in garage and a board with nail fell on leg, punctured. States cleaned off, applied salve, and wrapped. Wanted to check if needs stitches.   States tetanus is up to date

## 2019-11-04 NOTE — ED PROVIDER NOTES
This is a 63-year-old semi-comes emergency room with a chief complaint of right leg laceration. Patient states that he was working in the garage when a board with a nail fell his leg. Patient states that the nail cut his right shin. Patient cleaned it off and applied some salve. Patient then left wrist wound. Patient states his tetanus shot is up-to-date. Patient states that he then came in to the emergency room to see if he needed stitches. The history is provided by the patient. No  was used. Laceration    The incident occurred 1 to 2 hours ago. The laceration is located on the right leg. The laceration is 3 cm in size. The injury mechanism is a metal edge. Foreign body present: no. The pain is at a severity of 4/10. The pain is mild. The pain has been constant since onset. Pertinent negatives include no numbness and no weakness. The patient's last tetanus shot was less than 5 years ago.        Past Medical History:   Diagnosis Date    Anxiety     Arrhythmia     SINUS TACHY    Langford's esophagus 3/19/2012    CAD (coronary artery disease)     Chronic pain     Depression     Diabetes mellitus ([address] 3/19/2012    GERD (gastroesophageal reflux disease)     Headache     Hearing loss     History of MI (myocardial infarction) 3-00    History of peptic ulcer disease 3/19/2012    HTN (hypertension) 3/19/2012    Memory loss     Morbid obesity ([address] 3/19/2012    2002-OPEN GASTRIC BYPASS    Other ill-defined conditions(799.89)     MIGRAINES    Psychiatric disorder     Reflux 3/19/2012    Sleep apnea, obstructive 3/19/2012    Unspecified adverse effect of anesthesia     HEADACHE    Unspecified sleep apnea     HX-PRIOR TO GASTRIC BYPASS       Past Surgical History:   Procedure Laterality Date    APPENDECTOMY      BIOPSY LIVER  9-3-02    HX APPENDECTOMY      HX CHOLECYSTECTOMY      HX GASTRIC BYPASS  9-3-02    HX HERNIA REPAIR  4.3.2012    Dr. Lida Encinas - laparoscopic incisional hernia repair    HX TONSILLECTOMY      LAP,VAGUS NERVE,TRUNCAL      NEUROLOGICAL PROCEDURE UNLISTED  2004    CERVICAL FUSION         Family History:   Problem Relation Age of Onset    Diabetes Mother     Hypertension Mother     Obesity Mother     Emphysema Mother     Heart Disease Mother     Cancer Father         prostate    Obesity Sister     Other Sister         aneurysm    Alcohol abuse Brother     Headache Brother     Other Brother         aneurysm       Social History     Socioeconomic History    Marital status:      Spouse name: Not on file    Number of children: Not on file    Years of education: Not on file    Highest education level: Not on file   Occupational History    Not on file   Social Needs    Financial resource strain: Not on file    Food insecurity:     Worry: Not on file     Inability: Not on file    Transportation needs:     Medical: Not on file     Non-medical: Not on file   Tobacco Use    Smoking status: Current Every Day Smoker     Packs/day: 1.00    Smokeless tobacco: Never Used   Substance and Sexual Activity    Alcohol use: Yes    Drug use: No    Sexual activity: Not on file   Lifestyle    Physical activity:     Days per week: Not on file     Minutes per session: Not on file    Stress: Not on file   Relationships    Social connections:     Talks on phone: Not on file     Gets together: Not on file     Attends Advent service: Not on file     Active member of club or organization: Not on file     Attends meetings of clubs or organizations: Not on file     Relationship status: Not on file    Intimate partner violence:     Fear of current or ex partner: Not on file     Emotionally abused: Not on file     Physically abused: Not on file     Forced sexual activity: Not on file   Other Topics Concern    Not on file   Social History Narrative    Not on file     ALLERGIES: Patient has no known allergies.     Review of Systems   Constitutional: Negative for appetite change, chills, fever and unexpected weight change. HENT: Negative for ear pain, hearing loss, rhinorrhea and trouble swallowing. Eyes: Negative for pain and visual disturbance. Respiratory: Negative for cough, chest tightness and shortness of breath. Cardiovascular: Negative for chest pain and palpitations. Gastrointestinal: Negative for abdominal distention, abdominal pain, blood in stool and vomiting. Genitourinary: Negative for dysuria, hematuria and urgency. Musculoskeletal: Negative for back pain and myalgias. Skin: Positive for wound. Negative for rash. Neurological: Negative for dizziness, syncope, weakness and numbness. Psychiatric/Behavioral: Negative for confusion and suicidal ideas. All other systems reviewed and are negative. Vitals:    11/04/19 0331 11/04/19 0424   BP: (!) 166/96 164/83   Pulse: 94 78   Resp: 20 (!) 96   Temp: 98.3 °F (36.8 °C) 98.3 °F (36.8 °C)   SpO2: 98% 95%   Weight: 74.8 kg (165 lb)    Height: 6' (1.829 m)             Physical Exam   Constitutional: He is oriented to person, place, and time. He appears well-developed and well-nourished. No distress. HENT:   Head: Normocephalic and atraumatic. Eyes: Pupils are equal, round, and reactive to light. Right eye exhibits no discharge. Left eye exhibits no discharge. Neck: Normal range of motion. No JVD present. No tracheal deviation present. Cardiovascular: Normal rate, regular rhythm, normal heart sounds and intact distal pulses. Exam reveals no gallop and no friction rub. No murmur heard. Pulmonary/Chest: Effort normal and breath sounds normal. No stridor. No respiratory distress. He has no wheezes. He has no rales. Musculoskeletal: He exhibits tenderness. He exhibits no edema. Right lower leg: He exhibits tenderness and laceration. Legs:  Neurological: He is alert and oriented to person, place, and time. He displays normal reflexes. No cranial nerve deficit. He exhibits normal muscle tone. Coordination normal.   Skin: Skin is warm. Capillary refill takes less than 2 seconds. No rash noted. He is not diaphoretic. No erythema. No pallor. Psychiatric: He has a normal mood and affect. His behavior is normal. Judgment and thought content normal.   Nursing note and vitals reviewed. MDM  Number of Diagnoses or Management Options  Laceration of right lower extremity, initial encounter:      Amount and/or Complexity of Data Reviewed  Tests in the radiology section of CPT®: ordered and reviewed    Risk of Complications, Morbidity, and/or Mortality  Presenting problems: moderate  Diagnostic procedures: low  Management options: low    Patient Progress  Patient progress: stable       Wound Repair  Date/Time: 11/4/2019 4:05 AM  Performed by: attendingPreparation: skin prepped with Betadine  Pre-procedure re-eval: Immediately prior to the procedure, the patient was reevaluated and found suitable for the planned procedure and any planned medications. Time out: Immediately prior to the procedure a time out was called to verify the correct patient, procedure, equipment, staff and marking as appropriate. .  Location details: right leg  Wound length:2.6 - 7.5 cm (3)  Anesthesia: local infiltration    Anesthesia:  Local Anesthetic: lidocaine 1% with epinephrine  Anesthetic total: 2 mL  Irrigation solution: saline  Irrigation method: syringe  Skin closure: 4-0 nylon  Number of sutures: 3  Technique: simple and interrupted  Approximation: loose  Dressing: antibiotic ointment  Patient tolerance: Patient tolerated the procedure well with no immediate complications  My total time at bedside, performing this procedure was 1-15 minutes (13 minutes). Chief Complaint   Patient presents with    Laceration       The patient's presenting problems have been discussed, and they are in agreement with the care plan formulated and outlined with them.  I have encouraged them to ask questions as they arise throughout their visit. MEDICATIONS GIVEN:  Medications   neomycin-bacitracnZn-polymyxnB (NEOSPORIN) ointment 1 Packet (1 Packet Topical Given 11/4/19 0359)   lidocaine-EPINEPHrine (XYLOCAINE) 1 %-1:100,000 injection 100 mg (100 mg IntraDERMal Given by Provider 11/4/19 0358)       LABS REVIEWED:  No results found for this or any previous visit (from the past 24 hour(s)). VITAL SIGNS:  Patient Vitals for the past 24 hrs:   Temp Pulse Resp BP SpO2   11/04/19 0424 98.3 °F (36.8 °C) 78 (!) 96 164/83 95 %   11/04/19 0331 98.3 °F (36.8 °C) 94 20 (!) 166/96 98 %       RADIOLOGY RESULTS:  The following have been ordered and reviewed:  Xr Tib/fib Rt    Result Date: 11/4/2019  EXAM: XR TIB/FIB RT INDICATION: anterior laceration, ? ana involvement. COMPARISON: None. FINDINGS: AP and lateral  views of the right tibia and fibula demonstrate no fracture or other acute osseous, articular or soft tissue abnormality. No visible radiopaque foreign body. Plantar heel spur. Degenerative changes in the midfoot. IMPRESSION: 1. No visible fracture and no visible radiopaque foreign body. PROGRESS NOTES:  Discussed results and plan with patient. Patient will be discharged home with PCP follow up. Patient instructed to return to the emergency room for any worsening symptoms or any other concerns. DIAGNOSIS:    1.  Laceration of right lower extremity, initial encounter        PLAN:  Follow-up Information     Follow up With Specialties Details Why Contact Info    Georgi Clements MD Family Practice In 10 days For suture removal [address] [place] [address]  [phone]      [facility] EMERGENCY DEPT Emergency Medicine  If symptoms worsen [address] [facility]  [phone]        Current Discharge Medication List      CONTINUE these medications which have NOT CHANGED    Details   SUMAtriptan succinate 6 mg/0.5 mL kit INJECT 0.5 ML ONCE AS NEEDED FOR MIGRAINE UP TO 1 DOSE  Qty: 9 Kit, Refills: 6      onabotulinumtoxinA (BOTOX) 200 unit injection 155 units IM to 31 FDA approved sites of head and neck every 12 weeks    Discard unused portion  Indications: Migraine Prevention  Qty: 200 Units, Refills: 3    Associated Diagnoses: Migraine without status migrainosus, not intractable, unspecified migraine type      oxyCODONE-acetaminophen (PERCOCET 10)  mg per tablet Take 1 Tab by mouth. SUMAtriptan succinate (ZEMBRACE SYMTOUCH) 3 mg/0.5 mL pnij 1 Syringe by SubCUTAneous route as needed (migraine). At headache onset. May repeat again every hour X 4 doses. Max 4 doses in 24 hours. Qty: 8 Syringe, Refills: 5    Associated Diagnoses: Migraine without status migrainosus, not intractable, unspecified migraine type      !! dihydroergotamine (MIGRANAL) 0.5 mg/pump act. (4 mg/mL) nasal spray 1 Spray by Nasal route as needed for Migraine. use in one nostril as directed. No more than 4 sprays in one hour  Qty: 3 Bottle, Refills: 0    Associated Diagnoses: Migraine without status migrainosus, not intractable, unspecified migraine type      !! dihydroergotamine (MIGRANAL) 0.5 mg/pump act. (4 mg/mL) nasal spray One spray in each nostril and repeat in 15 minutes X 1 . No more then 4 sprays in 24 hours  Qty: 8 Bottle, Refills: 3    Associated Diagnoses: Chronic migraine without aura without status migrainosus, not intractable      DULoxetine (CYMBALTA) 30 mg capsule Take 1 Cap by mouth daily. multivitamin (ONE A DAY) tablet Take 1 Tab by mouth daily. cyanocobalamin (VITAMIN B-12) 500 mcg tablet Take 500 mcg by mouth daily. VITAMIN A/VITAMIN D2/COD LIVER (VITAMINS A & D PO) Take  by mouth. CALCIUM CITRATE/VITAMIN D3 (CALCIUM CITRATE + D PO) Take  by mouth. PROPRANOLOL HCL (INDERAL PO) Take  by mouth. 10 MG NIGHTLY      aspirin delayed-release 81 mg tablet Take  by mouth daily. lansoprazole (PREVACID) 30 mg capsule Take  by mouth two (2) times a day. metoprolol (LOPRESSOR) 25 mg tablet Take  by mouth two (2) times a day. lamoTRIgine (LAMICTAL) 100 mg tablet Take  by mouth daily. metoclopramide (REGLAN) 10 mg tablet Take 10 mg by mouth Before breakfast, lunch, dinner and at bedtime. !! - Potential duplicate medications found. Please discuss with provider. ED COURSE: The patient's hospital course has been uncomplicated.

## 2019-11-04 NOTE — ED NOTES
Patient discharged from ED by provider. Discharge instructions reviewed with patient and all questions answered. Patient Ambulatory from ED in [facility].

## 2019-11-04 NOTE — DISCHARGE INSTRUCTIONS
We hope that we have addressed all of your medical concerns. The examination and treatment you received in the Emergency Department were for an emergent problem and were not intended as complete care. It is important that you follow up with your healthcare provider(s) for ongoing care. If your symptoms worsen or do not improve as expected, and you are unable to reach your usual health care provider(s), you should return to the Emergency Department. Today's healthcare is undergoing tremendous change, and patient satisfaction surveys are one of the many tools to assess the quality of medical care. You may receive a survey from the [employer] organization regarding your experience in the Emergency Department. I hope that your experience has been completely positive, particularly the medical care that I provided. As such, please participate in the survey; anything less than excellent does not meet my expectations or intentions. [facility] and [facility] participate in nationally recognized quality of care measures. If your blood pressure is greater than 120/80, as reported below, we urge that you seek medical care to address the potential of high blood pressure, commonly known as hypertension. Hypertension can be hereditary or can be caused by certain medical conditions, pain, stress, or \"white coat syndrome. \"       Please make an appointment with your health care provider(s) for follow up of your Emergency Department visit. VITALS:   Patient Vitals for the past 8 hrs:   Temp Pulse Resp BP SpO2   11/04/19 0331 98.3 °F (36.8 °C) 94 20 (!) 166/96 98 %          Thank you for allowing us to provide you with medical care today. We realize that you have many choices for your emergency care needs. Please choose us in the future for any continued health care needs. Tamanna Shaikh, [employer]. Office: [phone]            No results found for this or any previous visit (from the past 24 hour(s)). Xr Tib/fib Rt    Result Date: 11/4/2019  EXAM: XR TIB/FIB RT INDICATION: anterior laceration, ? ana involvement. COMPARISON: None. FINDINGS: AP and lateral  views of the right tibia and fibula demonstrate no fracture or other acute osseous, articular or soft tissue abnormality. No visible radiopaque foreign body. Plantar heel spur. Degenerative changes in the midfoot. IMPRESSION: 1. No visible fracture and no visible radiopaque foreign body. Patient Education        Cuts: Care Instructions  Your Care Instructions  A cut can happen anywhere on your body. Stitches, staples, skin adhesives, or pieces of tape called Steri-Strips are sometimes used to keep the edges of a cut together and help it heal. Steri-Strips can be used by themselves or with stitches or staples. Sometimes cuts are left open. If the cut went deep and through the skin, the doctor may have closed the cut in two layers. A deeper layer of stitches brings the deep part of the cut together. These stitches will dissolve and don't need to be removed. The upper layer closure, which could be stitches, staples, Steri-Strips, or adhesive, is what you see on the cut. A cut is often covered by a bandage. The doctor has checked you carefully, but problems can develop later. If you notice any problems or new symptoms, get medical treatment right away. Follow-up care is a key part of your treatment and safety. Be sure to make and go to all appointments, and call your doctor if you are having problems. It's also a good idea to know your test results and keep a list of the medicines you take. How can you care for yourself at home? If a cut is open or closed  · Prop up the sore area on a pillow anytime you sit or lie down during the next 3 days. Try to keep it above the level of your heart. This will help reduce swelling.   · Keep the cut dry for the first 24 to 48 hours. After this, you can shower if your doctor okays it. Pat the cut dry. · Don't soak the cut, such as in a bathtub. Your doctor will tell you when it's safe to get the cut wet. · After the first 24 to 48 hours, clean the cut with soap and water 2 times a day unless your doctor gives you different instructions. ? Don't use hydrogen peroxide or alcohol, which can slow healing. ? You may cover the cut with a thin layer of petroleum jelly and a nonstick bandage. ? If the doctor put a bandage over the cut, put on a new bandage after cleaning the cut or if the bandage gets wet or dirty. · Avoid any activity that could cause your cut to reopen. · Be safe with medicines. Read and follow all instructions on the label. ? If the doctor gave you a prescription medicine for pain, take it as prescribed. ? If you are not taking a prescription pain medicine, ask your doctor if you can take an over-the-counter medicine. If the cut is closed with stitches, staples, or Steri-Strips  · Follow the above instructions for open or closed cuts. · Do not remove the stitches or staples on your own. Your doctor will tell you when to come back to have the stitches or staples removed. · Leave Steri-Strips on until they fall off. If the cut is closed with a skin adhesive  · Follow the above instructions for open or closed cuts. · Leave the skin adhesive on your skin until it falls off on its own. This may take 5 to 10 days. · Do not scratch, rub, or pick at the adhesive. · Do not put the sticky part of a bandage directly on the adhesive. · Do not put any kind of ointment, cream, or lotion over the area. This can make the adhesive fall off too soon. Do not use hydrogen peroxide or alcohol, which can slow healing. When should you call for help?   Call your doctor now or seek immediate medical care if:    · You have new pain, or your pain gets worse.     · The skin near the cut is cold or pale or changes color.     · You have tingling, weakness, or numbness near the cut.     · The cut starts to bleed, and blood soaks through the bandage. Oozing small amounts of blood is normal.     · You have trouble moving the area near the cut.     · You have symptoms of infection, such as:  ? Increased pain, swelling, warmth, or redness around the cut.  ? Red streaks leading from the cut.  ? Pus draining from the cut.  ? A fever.    Watch closely for changes in your health, and be sure to contact your doctor if:    · The cut reopens.     · You do not get better as expected. Where can you learn more? Go to http://lore-vin.info/. Enter M735 in the search box to learn more about \"Cuts: Care Instructions. \"  Current as of: June 26, 2019  Content Version: 12.2  © 1107-5364 [employer]. Care instructions adapted under license by [employer] (which disclaims liability or warranty for this information). If you have questions about a medical condition or this instruction, always ask your healthcare professional. [address] any warranty or liability for your use of this information.

## 2019-11-12 ENCOUNTER — OFFICE VISIT (OUTPATIENT)
Dept: NEUROLOGY | Age: 59
End: 2019-11-12

## 2019-11-12 VITALS
HEIGHT: 72 IN | WEIGHT: 162 LBS | DIASTOLIC BLOOD PRESSURE: 80 MMHG | OXYGEN SATURATION: 97 % | BODY MASS INDEX: 21.94 KG/M2 | HEART RATE: 86 BPM | SYSTOLIC BLOOD PRESSURE: 140 MMHG | RESPIRATION RATE: 16 BRPM

## 2019-11-12 DIAGNOSIS — G43.709 CHRONIC MIGRAINE WITHOUT AURA WITHOUT STATUS MIGRAINOSUS, NOT INTRACTABLE: Primary | ICD-10-CM

## 2019-11-12 RX ORDER — CEFUROXIME AXETIL 250 MG/1
TABLET ORAL
Qty: 9 KIT | Refills: 6 | Status: SHIPPED | OUTPATIENT
Start: 2019-11-12 | End: 2020-10-14 | Stop reason: SDUPTHER

## 2019-11-12 NOTE — PROGRESS NOTES
[facility] and [address] at [facility] at [address] [place] [address], [address] [address] [address]   [phone]              Chief Complaint   Patient presents with    Migraine     12-15 per mo of \"days where they last for hours to a couple of days\"  Will have to lie down in dark cool room. Current Outpatient Medications   Medication Sig Dispense Refill    SUMAtriptan succinate 6 mg/0.5 mL kit INJECT 0.5 ML ONCE AS NEEDED FOR MIGRAINE UP TO 1 DOSE 9 Kit 6    oxyCODONE-acetaminophen (PERCOCET 10)  mg per tablet Take 1 Tab by mouth.  dihydroergotamine (MIGRANAL) 0.5 mg/pump act. (4 mg/mL) nasal spray One spray in each nostril and repeat in 15 minutes X 1 . No more then 4 sprays in 24 hours 8 Bottle 3    DULoxetine (CYMBALTA) 30 mg capsule Take 1 Cap by mouth daily.  multivitamin (ONE A DAY) tablet Take 1 Tab by mouth daily.  cyanocobalamin (VITAMIN B-12) 500 mcg tablet Take 500 mcg by mouth daily.  VITAMIN A/VITAMIN D2/COD LIVER (VITAMINS A & D PO) Take  by mouth.  CALCIUM CITRATE/VITAMIN D3 (CALCIUM CITRATE + D PO) Take  by mouth.  PROPRANOLOL HCL (INDERAL PO) Take  by mouth. 10 MG NIGHTLY      aspirin delayed-release 81 mg tablet Take  by mouth daily.  lansoprazole (PREVACID) 30 mg capsule Take  by mouth two (2) times a day.  metoprolol (LOPRESSOR) 25 mg tablet Take  by mouth two (2) times a day.  lamoTRIgine (LAMICTAL) 100 mg tablet Take  by mouth daily.  metoclopramide (REGLAN) 10 mg tablet Take 10 mg by mouth Before breakfast, lunch, dinner and at bedtime.         onabotulinumtoxinA (BOTOX) 200 unit injection 155 units IM to 31 FDA approved sites of head and neck every 12 weeks    Discard unused portion  Indications: Migraine Prevention 200 Units 3      No Known Allergies  Social History     Tobacco Use  Smoking status: Current Every Day Smoker     Packs/day: 1.00    Smokeless tobacco: Never Used   Substance Use Topics    Alcohol use: Yes    Drug use: No   Patient returns today for follow-up. He has intractable migraine headaches. He had been getting Botox for prevention of migraine. There is been some issue in getting the Botox approved. He is not had it for over a year. He reports having at least 15 headache days per month lasting hours. He lays down in a cool dark room. No associated focal deficits. Examination  Visit Vitals  /80 (BP 1 Location: Left arm, BP Patient Position: Sitting)   Pulse 86   Resp 16   Ht 6' (1.829 m)   Wt 73.5 kg (162 lb)   SpO2 97%   BMI 21.97 kg/m²     Awake alert oriented conversant. Normal speech and language. Normal cognitive function. No cranial nerve deficit. Intact motor function throughout. Reflexes symmetrical without pathologic reflexes. No ataxia. Steady gait    Impression/Plan  Chronic intractable migraine headaches unremitting and he is been on multiple preventatives including Botox. At this juncture we discussed migraine and migraine pathophysiology and specifically discussed that in the context of CG RP and we discussed the CGRP inhibitors. Today we will start Emgality in a customary fashion. Discussed studies, potential side effects, potential benefits and alternatives. Track headaches on a calendar. Continue to use Imitrex 6 mg injection for abortive therapy. Follow in 3 months    Total time: 25 min   Counseling / coordination time: 15 min   > 50% counseling / coordination?: Yes re: as documented above        Joe jN MD      This note was created using voice recognition software. Despite editing, there may be syntax errors. This note will not be viewable in [address]

## 2019-11-12 NOTE — TELEPHONE ENCOUNTER
PA initiated through [address] [place], [place] [address]s key # [id number] Rx #: [id number]    pending

## 2019-11-12 NOTE — PROGRESS NOTES
Chief Complaint   Patient presents with    Migraine     12-15 per mo of \"days where they last for hours to a couple of days\"  Will have to lie down in dark cool room.

## 2019-11-13 NOTE — TELEPHONE ENCOUNTER
Prior Auth APPROVED for [facility] by [employer]. Effective Dates 11/12/19 - 05/12/20. Case #[id number]. Approval will be scanned into media. Please send Rx to patient's preferred pharmacy (if necessary).

## 2020-03-17 ENCOUNTER — TELEPHONE (OUTPATIENT)
Dept: NEUROLOGY | Age: 60
End: 2020-03-17

## 2020-03-18 NOTE — TELEPHONE ENCOUNTER
----- Message from Taylor Arrington sent at 3/18/2020  2:00 PM EDT -----  Regarding: Dr. Silvio Way Message/Vendor Calls    Caller's first and last name:      Reason for call: following up on Rx for migraine injections that is no longer being covered by pt insurance. Pt has an injection appt scheduled for tomorrow.        Callback required yes/no and why: yes      Best contact number(s): [phone]      Details to clarify the request:      Taylor Arrington

## 2020-03-18 NOTE — TELEPHONE ENCOUNTER
Prior Authorization submitted for [facility] to [facility] via Cover My Meds. Status Pending. Allow 24-72 hours for response.      [facility] Key: [id number]  Insurance Case #: [id number]

## 2020-03-18 NOTE — TELEPHONE ENCOUNTER
Prior Authorization APPROVED for Emgality 120mg by [employer]. Effective dates 03/18/20 - 03/17/21, Case #[id number]. Approval will be scanned into media.  Pharmacy has been notified of approval.

## 2020-03-19 DIAGNOSIS — G43.709 CHRONIC MIGRAINE WITHOUT AURA WITHOUT STATUS MIGRAINOSUS, NOT INTRACTABLE: ICD-10-CM

## 2020-03-19 RX ORDER — GALCANEZUMAB 120 MG/ML
120 INJECTION, SOLUTION SUBCUTANEOUS
Qty: 2 ML | Refills: 0 | Status: SHIPPED | COMMUNITY
Start: 2020-03-19 | End: 2020-10-01 | Stop reason: SDUPTHER

## 2020-05-12 ENCOUNTER — HOSPITAL ENCOUNTER (INPATIENT)
Age: 60
LOS: 4 days | Discharge: HOME HEALTH | DRG: 501 | End: 2020-05-16
Attending: EMERGENCY MEDICINE | Admitting: ORTHOPAEDIC SURGERY
Payer: MEDICARE

## 2020-05-12 ENCOUNTER — ANESTHESIA (OUTPATIENT)
Dept: SURGERY | Age: 60
DRG: 501 | End: 2020-05-12
Payer: MEDICARE

## 2020-05-12 ENCOUNTER — APPOINTMENT (OUTPATIENT)
Dept: GENERAL RADIOLOGY | Age: 60
DRG: 501 | End: 2020-05-12
Attending: EMERGENCY MEDICINE
Payer: MEDICARE

## 2020-05-12 ENCOUNTER — ANESTHESIA EVENT (OUTPATIENT)
Dept: SURGERY | Age: 60
DRG: 501 | End: 2020-05-12
Payer: MEDICARE

## 2020-05-12 DIAGNOSIS — A49.01 MSSA (METHICILLIN SUSCEPTIBLE STAPHYLOCOCCUS AUREUS) INFECTION: ICD-10-CM

## 2020-05-12 DIAGNOSIS — Z98.84 H/O GASTRIC BYPASS: ICD-10-CM

## 2020-05-12 DIAGNOSIS — E11.69 CONTROLLED TYPE 2 DIABETES MELLITUS WITH OTHER SPECIFIED COMPLICATION, UNSPECIFIED WHETHER LONG TERM INSULIN USE (HCC): ICD-10-CM

## 2020-05-12 DIAGNOSIS — T81.49XA WOUND INFECTION AFTER SURGERY: Primary | ICD-10-CM

## 2020-05-12 DIAGNOSIS — L03.113 CELLULITIS OF RIGHT WRIST: ICD-10-CM

## 2020-05-12 DIAGNOSIS — M65.9 TENOSYNOVITIS OF RIGHT WRIST: ICD-10-CM

## 2020-05-12 LAB
ALBUMIN SERPL-MCNC: 3.6 G/DL (ref 3.5–5)
ALBUMIN/GLOB SERPL: 1 {RATIO} (ref 1.1–2.2)
ALP SERPL-CCNC: 191 U/L (ref 45–117)
ALT SERPL-CCNC: 15 U/L (ref 12–78)
ANION GAP SERPL CALC-SCNC: 6 MMOL/L (ref 5–15)
AST SERPL-CCNC: 12 U/L (ref 15–37)
BASOPHILS # BLD: 0.1 K/UL (ref 0–0.1)
BASOPHILS NFR BLD: 1 % (ref 0–1)
BILIRUB SERPL-MCNC: 0.4 MG/DL (ref 0.2–1)
BUN SERPL-MCNC: 9 MG/DL (ref 6–20)
BUN/CREAT SERPL: 10 (ref 12–20)
CALCIUM SERPL-MCNC: 9 MG/DL (ref 8.5–10.1)
CHLORIDE SERPL-SCNC: 97 MMOL/L (ref 97–108)
CO2 SERPL-SCNC: 28 MMOL/L (ref 21–32)
COMMENT, HOLDF: NORMAL
CREAT SERPL-MCNC: 0.89 MG/DL (ref 0.7–1.3)
CRP SERPL-MCNC: 7.4 MG/DL (ref 0–0.6)
DIFFERENTIAL METHOD BLD: ABNORMAL
EOSINOPHIL # BLD: 0.1 K/UL (ref 0–0.4)
EOSINOPHIL NFR BLD: 1 % (ref 0–7)
ERYTHROCYTE [DISTWIDTH] IN BLOOD BY AUTOMATED COUNT: 13.8 % (ref 11.5–14.5)
ERYTHROCYTE [SEDIMENTATION RATE] IN BLOOD: 10 MM/HR (ref 0–20)
GLOBULIN SER CALC-MCNC: 3.7 G/DL (ref 2–4)
GLUCOSE BLD STRIP.AUTO-MCNC: 97 MG/DL (ref 65–100)
GLUCOSE SERPL-MCNC: 239 MG/DL (ref 65–100)
HCT VFR BLD AUTO: 42.7 % (ref 36.6–50.3)
HGB BLD-MCNC: 13.7 G/DL (ref 12.1–17)
IMM GRANULOCYTES # BLD AUTO: 0.1 K/UL (ref 0–0.04)
IMM GRANULOCYTES NFR BLD AUTO: 1 % (ref 0–0.5)
LACTATE SERPL-SCNC: 0.7 MMOL/L (ref 0.4–2)
LACTATE SERPL-SCNC: 3 MMOL/L (ref 0.4–2)
LYMPHOCYTES # BLD: 1.2 K/UL (ref 0.8–3.5)
LYMPHOCYTES NFR BLD: 10 % (ref 12–49)
MCH RBC QN AUTO: 29.2 PG (ref 26–34)
MCHC RBC AUTO-ENTMCNC: 32.1 G/DL (ref 30–36.5)
MCV RBC AUTO: 91 FL (ref 80–99)
MONOCYTES # BLD: 0.8 K/UL (ref 0–1)
MONOCYTES NFR BLD: 7 % (ref 5–13)
NEUTS SEG # BLD: 9.9 K/UL (ref 1.8–8)
NEUTS SEG NFR BLD: 80 % (ref 32–75)
NRBC # BLD: 0 K/UL (ref 0–0.01)
NRBC BLD-RTO: 0 PER 100 WBC
PLATELET # BLD AUTO: 292 K/UL (ref 150–400)
PMV BLD AUTO: 9.9 FL (ref 8.9–12.9)
POTASSIUM SERPL-SCNC: 3.3 MMOL/L (ref 3.5–5.1)
PROT SERPL-MCNC: 7.3 G/DL (ref 6.4–8.2)
RBC # BLD AUTO: 4.69 M/UL (ref 4.1–5.7)
SAMPLES BEING HELD,HOLD: NORMAL
SERVICE CMNT-IMP: NORMAL
SODIUM SERPL-SCNC: 131 MMOL/L (ref 136–145)
WBC # BLD AUTO: 12.1 K/UL (ref 4.1–11.1)

## 2020-05-12 PROCEDURE — 82962 GLUCOSE BLOOD TEST: CPT

## 2020-05-12 PROCEDURE — 74011250636 HC RX REV CODE- 250/636: Performed by: ORTHOPAEDIC SURGERY

## 2020-05-12 PROCEDURE — 85652 RBC SED RATE AUTOMATED: CPT

## 2020-05-12 PROCEDURE — 83605 ASSAY OF LACTIC ACID: CPT

## 2020-05-12 PROCEDURE — 74011250636 HC RX REV CODE- 250/636: Performed by: ANESTHESIOLOGY

## 2020-05-12 PROCEDURE — 87075 CULTR BACTERIA EXCEPT BLOOD: CPT

## 2020-05-12 PROCEDURE — 74011000272 HC RX REV CODE- 272: Performed by: ORTHOPAEDIC SURGERY

## 2020-05-12 PROCEDURE — 77030002916 HC SUT ETHLN J&J -A: Performed by: ORTHOPAEDIC SURGERY

## 2020-05-12 PROCEDURE — 74011000250 HC RX REV CODE- 250: Performed by: ORTHOPAEDIC SURGERY

## 2020-05-12 PROCEDURE — 74011250636 HC RX REV CODE- 250/636: Performed by: NURSE ANESTHETIST, CERTIFIED REGISTERED

## 2020-05-12 PROCEDURE — 74011250636 HC RX REV CODE- 250/636: Performed by: EMERGENCY MEDICINE

## 2020-05-12 PROCEDURE — 96365 THER/PROPH/DIAG IV INF INIT: CPT

## 2020-05-12 PROCEDURE — 85025 COMPLETE CBC W/AUTO DIFF WBC: CPT

## 2020-05-12 PROCEDURE — 77030011640 HC PAD GRND REM COVD -A: Performed by: ORTHOPAEDIC SURGERY

## 2020-05-12 PROCEDURE — 87040 BLOOD CULTURE FOR BACTERIA: CPT

## 2020-05-12 PROCEDURE — 76060000032 HC ANESTHESIA 0.5 TO 1 HR: Performed by: ORTHOPAEDIC SURGERY

## 2020-05-12 PROCEDURE — 80053 COMPREHEN METABOLIC PANEL: CPT

## 2020-05-12 PROCEDURE — 86140 C-REACTIVE PROTEIN: CPT

## 2020-05-12 PROCEDURE — 76010000138 HC OR TIME 0.5 TO 1 HR: Performed by: ORTHOPAEDIC SURGERY

## 2020-05-12 PROCEDURE — 73100 X-RAY EXAM OF WRIST: CPT

## 2020-05-12 PROCEDURE — 87077 CULTURE AEROBIC IDENTIFY: CPT

## 2020-05-12 PROCEDURE — 87205 SMEAR GRAM STAIN: CPT

## 2020-05-12 PROCEDURE — 74011250637 HC RX REV CODE- 250/637: Performed by: PHYSICIAN ASSISTANT

## 2020-05-12 PROCEDURE — 96375 TX/PRO/DX INJ NEW DRUG ADDON: CPT

## 2020-05-12 PROCEDURE — 77030018836 HC SOL IRR NACL ICUM -A: Performed by: ORTHOPAEDIC SURGERY

## 2020-05-12 PROCEDURE — 87186 SC STD MICRODIL/AGAR DIL: CPT

## 2020-05-12 PROCEDURE — 76210000006 HC OR PH I REC 0.5 TO 1 HR: Performed by: ORTHOPAEDIC SURGERY

## 2020-05-12 PROCEDURE — 74011250637 HC RX REV CODE- 250/637: Performed by: ORTHOPAEDIC SURGERY

## 2020-05-12 PROCEDURE — 99284 EMERGENCY DEPT VISIT MOD MDM: CPT

## 2020-05-12 PROCEDURE — 74011000250 HC RX REV CODE- 250: Performed by: NURSE ANESTHETIST, CERTIFIED REGISTERED

## 2020-05-12 PROCEDURE — 36415 COLL VENOUS BLD VENIPUNCTURE: CPT

## 2020-05-12 PROCEDURE — 74011000258 HC RX REV CODE- 258: Performed by: ORTHOPAEDIC SURGERY

## 2020-05-12 PROCEDURE — 0LB50ZZ EXCISION OF RIGHT LOWER ARM AND WRIST TENDON, OPEN APPROACH: ICD-10-PCS | Performed by: ORTHOPAEDIC SURGERY

## 2020-05-12 PROCEDURE — 96361 HYDRATE IV INFUSION ADD-ON: CPT

## 2020-05-12 PROCEDURE — 65270000029 HC RM PRIVATE

## 2020-05-12 RX ORDER — HYDROMORPHONE HYDROCHLORIDE 1 MG/ML
1 INJECTION, SOLUTION INTRAMUSCULAR; INTRAVENOUS; SUBCUTANEOUS ONCE
Status: COMPLETED | OUTPATIENT
Start: 2020-05-12 | End: 2020-05-12

## 2020-05-12 RX ORDER — MIDAZOLAM HYDROCHLORIDE 1 MG/ML
1 INJECTION, SOLUTION INTRAMUSCULAR; INTRAVENOUS
Status: DISCONTINUED | OUTPATIENT
Start: 2020-05-12 | End: 2020-05-12 | Stop reason: HOSPADM

## 2020-05-12 RX ORDER — FENTANYL CITRATE 50 UG/ML
50 INJECTION, SOLUTION INTRAMUSCULAR; INTRAVENOUS AS NEEDED
Status: DISCONTINUED | OUTPATIENT
Start: 2020-05-12 | End: 2020-05-12 | Stop reason: HOSPADM

## 2020-05-12 RX ORDER — DICLOFENAC SODIUM 10 MG/G
2 GEL TOPICAL
COMMUNITY

## 2020-05-12 RX ORDER — PROPOFOL 10 MG/ML
INJECTION, EMULSION INTRAVENOUS
Status: DISCONTINUED | OUTPATIENT
Start: 2020-05-12 | End: 2020-05-12 | Stop reason: HOSPADM

## 2020-05-12 RX ORDER — METOPROLOL SUCCINATE 25 MG/1
25 TABLET, EXTENDED RELEASE ORAL DAILY
COMMUNITY

## 2020-05-12 RX ORDER — FENTANYL CITRATE 50 UG/ML
INJECTION, SOLUTION INTRAMUSCULAR; INTRAVENOUS AS NEEDED
Status: DISCONTINUED | OUTPATIENT
Start: 2020-05-12 | End: 2020-05-12 | Stop reason: HOSPADM

## 2020-05-12 RX ORDER — TRAZODONE HYDROCHLORIDE 100 MG/1
100 TABLET ORAL
COMMUNITY

## 2020-05-12 RX ORDER — ONDANSETRON 2 MG/ML
4 INJECTION INTRAMUSCULAR; INTRAVENOUS
Status: DISCONTINUED | OUTPATIENT
Start: 2020-05-12 | End: 2020-05-16 | Stop reason: HOSPADM

## 2020-05-12 RX ORDER — MIDAZOLAM HYDROCHLORIDE 1 MG/ML
1 INJECTION, SOLUTION INTRAMUSCULAR; INTRAVENOUS AS NEEDED
Status: DISCONTINUED | OUTPATIENT
Start: 2020-05-12 | End: 2020-05-12 | Stop reason: HOSPADM

## 2020-05-12 RX ORDER — FERROUS SULFATE, DRIED 160(50) MG
1 TABLET, EXTENDED RELEASE ORAL DAILY
Status: DISCONTINUED | OUTPATIENT
Start: 2020-05-12 | End: 2020-05-16 | Stop reason: HOSPADM

## 2020-05-12 RX ORDER — TRAZODONE HYDROCHLORIDE 100 MG/1
100 TABLET ORAL
Status: DISCONTINUED | OUTPATIENT
Start: 2020-05-12 | End: 2020-05-16 | Stop reason: HOSPADM

## 2020-05-12 RX ORDER — LANOLIN ALCOHOL/MO/W.PET/CERES
500 CREAM (GRAM) TOPICAL DAILY
Status: DISCONTINUED | OUTPATIENT
Start: 2020-05-12 | End: 2020-05-16 | Stop reason: HOSPADM

## 2020-05-12 RX ORDER — ONDANSETRON 2 MG/ML
8 INJECTION INTRAMUSCULAR; INTRAVENOUS
Status: COMPLETED | OUTPATIENT
Start: 2020-05-12 | End: 2020-05-12

## 2020-05-12 RX ORDER — PROPRANOLOL HYDROCHLORIDE 10 MG/1
10 TABLET ORAL
Status: DISCONTINUED | OUTPATIENT
Start: 2020-05-12 | End: 2020-05-16 | Stop reason: HOSPADM

## 2020-05-12 RX ORDER — MORPHINE SULFATE 2 MG/ML
4 INJECTION, SOLUTION INTRAMUSCULAR; INTRAVENOUS
Status: DISCONTINUED | OUTPATIENT
Start: 2020-05-12 | End: 2020-05-15

## 2020-05-12 RX ORDER — OXYCODONE HYDROCHLORIDE 5 MG/1
5 TABLET ORAL AS NEEDED
Status: DISCONTINUED | OUTPATIENT
Start: 2020-05-12 | End: 2020-05-12 | Stop reason: HOSPADM

## 2020-05-12 RX ORDER — DEXTROSE MONOHYDRATE 100 MG/ML
0-250 INJECTION, SOLUTION INTRAVENOUS AS NEEDED
Status: DISCONTINUED | OUTPATIENT
Start: 2020-05-12 | End: 2020-05-16 | Stop reason: HOSPADM

## 2020-05-12 RX ORDER — DULOXETIN HYDROCHLORIDE 30 MG/1
30 CAPSULE, DELAYED RELEASE ORAL DAILY
Status: DISCONTINUED | OUTPATIENT
Start: 2020-05-12 | End: 2020-05-12

## 2020-05-12 RX ORDER — INSULIN LISPRO 100 [IU]/ML
INJECTION, SOLUTION INTRAVENOUS; SUBCUTANEOUS
Status: DISCONTINUED | OUTPATIENT
Start: 2020-05-12 | End: 2020-05-16 | Stop reason: HOSPADM

## 2020-05-12 RX ORDER — DEXTROSE, SODIUM CHLORIDE, SODIUM LACTATE, POTASSIUM CHLORIDE, AND CALCIUM CHLORIDE 5; .6; .31; .03; .02 G/100ML; G/100ML; G/100ML; G/100ML; G/100ML
125 INJECTION, SOLUTION INTRAVENOUS CONTINUOUS
Status: DISCONTINUED | OUTPATIENT
Start: 2020-05-12 | End: 2020-05-12 | Stop reason: HOSPADM

## 2020-05-12 RX ORDER — METOCLOPRAMIDE 10 MG/1
10 TABLET ORAL
Status: DISCONTINUED | OUTPATIENT
Start: 2020-05-12 | End: 2020-05-16 | Stop reason: HOSPADM

## 2020-05-12 RX ORDER — SODIUM CHLORIDE, SODIUM LACTATE, POTASSIUM CHLORIDE, CALCIUM CHLORIDE 600; 310; 30; 20 MG/100ML; MG/100ML; MG/100ML; MG/100ML
125 INJECTION, SOLUTION INTRAVENOUS CONTINUOUS
Status: DISCONTINUED | OUTPATIENT
Start: 2020-05-12 | End: 2020-05-12 | Stop reason: HOSPADM

## 2020-05-12 RX ORDER — SODIUM CHLORIDE 0.9 % (FLUSH) 0.9 %
5-40 SYRINGE (ML) INJECTION AS NEEDED
Status: DISCONTINUED | OUTPATIENT
Start: 2020-05-12 | End: 2020-05-12 | Stop reason: HOSPADM

## 2020-05-12 RX ORDER — OXYCODONE AND ACETAMINOPHEN 10; 325 MG/1; MG/1
1 TABLET ORAL
Status: DISCONTINUED | OUTPATIENT
Start: 2020-05-12 | End: 2020-05-16 | Stop reason: HOSPADM

## 2020-05-12 RX ORDER — KETOROLAC TROMETHAMINE 30 MG/ML
15 INJECTION, SOLUTION INTRAMUSCULAR; INTRAVENOUS EVERY 6 HOURS
Status: DISCONTINUED | OUTPATIENT
Start: 2020-05-12 | End: 2020-05-13

## 2020-05-12 RX ORDER — ACETAMINOPHEN 325 MG/1
650 TABLET ORAL ONCE
Status: DISCONTINUED | OUTPATIENT
Start: 2020-05-12 | End: 2020-05-12 | Stop reason: HOSPADM

## 2020-05-12 RX ORDER — KETOROLAC TROMETHAMINE 30 MG/ML
30 INJECTION, SOLUTION INTRAMUSCULAR; INTRAVENOUS
Status: COMPLETED | OUTPATIENT
Start: 2020-05-12 | End: 2020-05-12

## 2020-05-12 RX ORDER — MORPHINE SULFATE 10 MG/ML
2 INJECTION, SOLUTION INTRAMUSCULAR; INTRAVENOUS
Status: DISCONTINUED | OUTPATIENT
Start: 2020-05-12 | End: 2020-05-12 | Stop reason: HOSPADM

## 2020-05-12 RX ORDER — SODIUM CHLORIDE 0.9 % (FLUSH) 0.9 %
5-40 SYRINGE (ML) INJECTION EVERY 8 HOURS
Status: DISCONTINUED | OUTPATIENT
Start: 2020-05-12 | End: 2020-05-12 | Stop reason: HOSPADM

## 2020-05-12 RX ORDER — DIPHENHYDRAMINE HYDROCHLORIDE 50 MG/ML
12.5 INJECTION, SOLUTION INTRAMUSCULAR; INTRAVENOUS AS NEEDED
Status: DISCONTINUED | OUTPATIENT
Start: 2020-05-12 | End: 2020-05-12 | Stop reason: HOSPADM

## 2020-05-12 RX ORDER — LAMOTRIGINE 100 MG/1
200 TABLET ORAL EVERY 12 HOURS
Status: DISCONTINUED | OUTPATIENT
Start: 2020-05-12 | End: 2020-05-16 | Stop reason: HOSPADM

## 2020-05-12 RX ORDER — SODIUM CHLORIDE, SODIUM LACTATE, POTASSIUM CHLORIDE, CALCIUM CHLORIDE 600; 310; 30; 20 MG/100ML; MG/100ML; MG/100ML; MG/100ML
INJECTION, SOLUTION INTRAVENOUS
Status: DISCONTINUED | OUTPATIENT
Start: 2020-05-12 | End: 2020-05-12 | Stop reason: HOSPADM

## 2020-05-12 RX ORDER — PANTOPRAZOLE SODIUM 20 MG/1
20 TABLET, DELAYED RELEASE ORAL
Status: DISCONTINUED | OUTPATIENT
Start: 2020-05-12 | End: 2020-05-16 | Stop reason: HOSPADM

## 2020-05-12 RX ORDER — ASPIRIN 81 MG/1
81 TABLET ORAL DAILY
Status: DISCONTINUED | OUTPATIENT
Start: 2020-05-12 | End: 2020-05-16 | Stop reason: HOSPADM

## 2020-05-12 RX ORDER — FENTANYL CITRATE 50 UG/ML
25 INJECTION, SOLUTION INTRAMUSCULAR; INTRAVENOUS
Status: COMPLETED | OUTPATIENT
Start: 2020-05-12 | End: 2020-05-12

## 2020-05-12 RX ORDER — VANCOMYCIN HYDROCHLORIDE
1250 EVERY 12 HOURS
Status: DISCONTINUED | OUTPATIENT
Start: 2020-05-12 | End: 2020-05-14 | Stop reason: DRUGHIGH

## 2020-05-12 RX ORDER — LIDOCAINE HYDROCHLORIDE 10 MG/ML
0.5 INJECTION, SOLUTION EPIDURAL; INFILTRATION; INTRACAUDAL; PERINEURAL AS NEEDED
Status: DISCONTINUED | OUTPATIENT
Start: 2020-05-12 | End: 2020-05-12 | Stop reason: HOSPADM

## 2020-05-12 RX ORDER — METOPROLOL TARTRATE 25 MG/1
25 TABLET, FILM COATED ORAL 2 TIMES DAILY
Status: DISCONTINUED | OUTPATIENT
Start: 2020-05-12 | End: 2020-05-16 | Stop reason: HOSPADM

## 2020-05-12 RX ORDER — METOCLOPRAMIDE 10 MG/1
10 TABLET ORAL
Status: DISCONTINUED | OUTPATIENT
Start: 2020-05-12 | End: 2020-05-12 | Stop reason: DRUGHIGH

## 2020-05-12 RX ORDER — PROPOFOL 10 MG/ML
INJECTION, EMULSION INTRAVENOUS AS NEEDED
Status: DISCONTINUED | OUTPATIENT
Start: 2020-05-12 | End: 2020-05-12 | Stop reason: HOSPADM

## 2020-05-12 RX ORDER — LIDOCAINE HYDROCHLORIDE 20 MG/ML
INJECTION, SOLUTION EPIDURAL; INFILTRATION; INTRACAUDAL; PERINEURAL AS NEEDED
Status: DISCONTINUED | OUTPATIENT
Start: 2020-05-12 | End: 2020-05-12 | Stop reason: HOSPADM

## 2020-05-12 RX ORDER — MAGNESIUM SULFATE 100 %
4 CRYSTALS MISCELLANEOUS AS NEEDED
Status: DISCONTINUED | OUTPATIENT
Start: 2020-05-12 | End: 2020-05-16 | Stop reason: HOSPADM

## 2020-05-12 RX ORDER — SODIUM CHLORIDE 9 MG/ML
100 INJECTION, SOLUTION INTRAVENOUS CONTINUOUS
Status: DISCONTINUED | OUTPATIENT
Start: 2020-05-12 | End: 2020-05-15

## 2020-05-12 RX ORDER — VANCOMYCIN 1.75 GRAM/500 ML IN 0.9 % SODIUM CHLORIDE INTRAVENOUS
1750
Status: COMPLETED | OUTPATIENT
Start: 2020-05-12 | End: 2020-05-12

## 2020-05-12 RX ORDER — BUPIVACAINE HYDROCHLORIDE 5 MG/ML
INJECTION, SOLUTION EPIDURAL; INTRACAUDAL AS NEEDED
Status: DISCONTINUED | OUTPATIENT
Start: 2020-05-12 | End: 2020-05-12 | Stop reason: HOSPADM

## 2020-05-12 RX ORDER — ONDANSETRON 2 MG/ML
INJECTION INTRAMUSCULAR; INTRAVENOUS AS NEEDED
Status: DISCONTINUED | OUTPATIENT
Start: 2020-05-12 | End: 2020-05-12 | Stop reason: HOSPADM

## 2020-05-12 RX ORDER — MIDAZOLAM HYDROCHLORIDE 1 MG/ML
INJECTION, SOLUTION INTRAMUSCULAR; INTRAVENOUS AS NEEDED
Status: DISCONTINUED | OUTPATIENT
Start: 2020-05-12 | End: 2020-05-12 | Stop reason: HOSPADM

## 2020-05-12 RX ORDER — ONDANSETRON 2 MG/ML
4 INJECTION INTRAMUSCULAR; INTRAVENOUS AS NEEDED
Status: DISCONTINUED | OUTPATIENT
Start: 2020-05-12 | End: 2020-05-12 | Stop reason: HOSPADM

## 2020-05-12 RX ADMIN — PIPERACILLIN AND TAZOBACTAM 3.38 G: 3; .375 INJECTION, POWDER, LYOPHILIZED, FOR SOLUTION INTRAVENOUS at 18:41

## 2020-05-12 RX ADMIN — PROPOFOL 50 MG: 10 INJECTION, EMULSION INTRAVENOUS at 16:34

## 2020-05-12 RX ADMIN — HYDROMORPHONE HYDROCHLORIDE 1 MG: 1 INJECTION, SOLUTION INTRAMUSCULAR; INTRAVENOUS; SUBCUTANEOUS at 04:40

## 2020-05-12 RX ADMIN — LAMOTRIGINE 200 MG: 100 TABLET ORAL at 11:34

## 2020-05-12 RX ADMIN — PANTOPRAZOLE SODIUM 20 MG: 20 TABLET, DELAYED RELEASE ORAL at 18:18

## 2020-05-12 RX ADMIN — VANCOMYCIN HYDROCHLORIDE 1250 MG: 10 INJECTION, POWDER, LYOPHILIZED, FOR SOLUTION INTRAVENOUS at 18:14

## 2020-05-12 RX ADMIN — LAMOTRIGINE 200 MG: 100 TABLET ORAL at 21:19

## 2020-05-12 RX ADMIN — SODIUM CHLORIDE 1000 ML: 900 INJECTION, SOLUTION INTRAVENOUS at 04:39

## 2020-05-12 RX ADMIN — FENTANYL CITRATE 25 MCG: 50 INJECTION INTRAMUSCULAR; INTRAVENOUS at 17:52

## 2020-05-12 RX ADMIN — OYSTER SHELL CALCIUM WITH VITAMIN D 1 TABLET: 500; 200 TABLET, FILM COATED ORAL at 10:03

## 2020-05-12 RX ADMIN — FENTANYL CITRATE 50 MCG: 50 INJECTION, SOLUTION INTRAMUSCULAR; INTRAVENOUS at 16:28

## 2020-05-12 RX ADMIN — FENTANYL CITRATE 25 MCG: 50 INJECTION INTRAMUSCULAR; INTRAVENOUS at 17:47

## 2020-05-12 RX ADMIN — MORPHINE SULFATE 4 MG: 2 INJECTION, SOLUTION INTRAMUSCULAR; INTRAVENOUS at 21:19

## 2020-05-12 RX ADMIN — PROPOFOL 75 MCG/KG/MIN: 10 INJECTION, EMULSION INTRAVENOUS at 16:34

## 2020-05-12 RX ADMIN — MORPHINE SULFATE 4 MG: 2 INJECTION, SOLUTION INTRAMUSCULAR; INTRAVENOUS at 14:57

## 2020-05-12 RX ADMIN — SODIUM CHLORIDE 1000 ML: 900 INJECTION, SOLUTION INTRAVENOUS at 05:57

## 2020-05-12 RX ADMIN — VANCOMYCIN HYDROCHLORIDE 1750 MG: 10 INJECTION, POWDER, LYOPHILIZED, FOR SOLUTION INTRAVENOUS at 06:04

## 2020-05-12 RX ADMIN — ONDANSETRON HYDROCHLORIDE 4 MG: 2 INJECTION, SOLUTION INTRAMUSCULAR; INTRAVENOUS at 17:15

## 2020-05-12 RX ADMIN — SODIUM CHLORIDE, POTASSIUM CHLORIDE, SODIUM LACTATE AND CALCIUM CHLORIDE: 600; 310; 30; 20 INJECTION, SOLUTION INTRAVENOUS at 16:24

## 2020-05-12 RX ADMIN — PANTOPRAZOLE SODIUM 20 MG: 20 TABLET, DELAYED RELEASE ORAL at 11:34

## 2020-05-12 RX ADMIN — MORPHINE SULFATE 4 MG: 2 INJECTION, SOLUTION INTRAMUSCULAR; INTRAVENOUS at 12:16

## 2020-05-12 RX ADMIN — ASPIRIN 81 MG: 81 TABLET, COATED ORAL at 10:03

## 2020-05-12 RX ADMIN — METOCLOPRAMIDE 10 MG: 10 TABLET ORAL at 10:13

## 2020-05-12 RX ADMIN — PROPRANOLOL HYDROCHLORIDE 10 MG: 10 TABLET ORAL at 21:19

## 2020-05-12 RX ADMIN — OXYCODONE HYDROCHLORIDE AND ACETAMINOPHEN 1 TABLET: 10; 325 TABLET ORAL at 10:03

## 2020-05-12 RX ADMIN — FENTANYL CITRATE 25 MCG: 50 INJECTION INTRAMUSCULAR; INTRAVENOUS at 17:42

## 2020-05-12 RX ADMIN — KETOROLAC TROMETHAMINE 30 MG: 30 INJECTION, SOLUTION INTRAMUSCULAR at 05:57

## 2020-05-12 RX ADMIN — FENTANYL CITRATE 25 MCG: 50 INJECTION INTRAMUSCULAR; INTRAVENOUS at 17:37

## 2020-05-12 RX ADMIN — ONDANSETRON 8 MG: 2 INJECTION INTRAMUSCULAR; INTRAVENOUS at 04:40

## 2020-05-12 RX ADMIN — KETOROLAC TROMETHAMINE 15 MG: 30 INJECTION, SOLUTION INTRAMUSCULAR at 17:37

## 2020-05-12 RX ADMIN — SODIUM CHLORIDE, POTASSIUM CHLORIDE, SODIUM LACTATE AND CALCIUM CHLORIDE 125 ML/HR: 600; 310; 30; 20 INJECTION, SOLUTION INTRAVENOUS at 16:05

## 2020-05-12 RX ADMIN — METOPROLOL TARTRATE 25 MG: 25 TABLET, FILM COATED ORAL at 10:03

## 2020-05-12 RX ADMIN — HYDROMORPHONE HYDROCHLORIDE 1 MG: 1 INJECTION, SOLUTION INTRAMUSCULAR; INTRAVENOUS; SUBCUTANEOUS at 07:26

## 2020-05-12 RX ADMIN — MIDAZOLAM 2 MG: 1 INJECTION INTRAMUSCULAR; INTRAVENOUS at 16:24

## 2020-05-12 RX ADMIN — KETOROLAC TROMETHAMINE 15 MG: 30 INJECTION, SOLUTION INTRAMUSCULAR at 11:34

## 2020-05-12 RX ADMIN — LIDOCAINE HYDROCHLORIDE 40 MG: 20 INJECTION, SOLUTION EPIDURAL; INFILTRATION; INTRACAUDAL; PERINEURAL at 16:34

## 2020-05-12 RX ADMIN — FENTANYL CITRATE 50 MCG: 50 INJECTION, SOLUTION INTRAMUSCULAR; INTRAVENOUS at 16:38

## 2020-05-12 RX ADMIN — SODIUM CHLORIDE 100 ML/HR: 900 INJECTION, SOLUTION INTRAVENOUS at 17:52

## 2020-05-12 RX ADMIN — TRAZODONE HYDROCHLORIDE 100 MG: 100 TABLET ORAL at 21:19

## 2020-05-12 RX ADMIN — CYANOCOBALAMIN TAB 500 MCG 500 MCG: 500 TAB at 10:03

## 2020-05-12 RX ADMIN — SODIUM CHLORIDE 100 ML/HR: 900 INJECTION, SOLUTION INTRAVENOUS at 10:05

## 2020-05-12 NOTE — ROUTINE PROCESS
TRANSFER - OUT REPORT: 
 
Verbal report given to Kelsi Cohen on [employer]  being transferred to [facility] for routine progression of care Report consisted of patients Situation, Background, Assessment and  
Recommendations(SBAR). Information from the following report(s) SBAR, ED Summary, [facility] and Recent Results was reviewed with the receiving nurse. Lines:  
Peripheral IV 05/12/20 Left Forearm (Active) Opportunity for questions and clarification was provided. Patient transported with: 
transport

## 2020-05-12 NOTE — PERIOP NOTES
TRANSFER - OUT REPORT:    Verbal report given to Eleuterio Recinos RN(name) on Car Parham  being transferred to (unit) for routine post - op       Report consisted of patients Situation, Background, Assessment and   Recommendations(SBAR). Time Pre op antibiotic given:N/A  Anesthesia Stop time: 3209  Lala Present on Transfer to floor:No  Order for Lala on Chart:N/A  Discharge Prescriptions with Chart:No    Information from the following report(s) SBAR, Procedure Summary, Intake/Output and MAR was reviewed with the receiving nurse. Opportunity for questions and clarification was provided. Is the patient on 02? NO          Is the patient on a monitor? NO    Is the nurse transporting with the patient? NO    Surgical Waiting Area notified of patient's transfer from PACU?  NO      The following personal items collected during your admission accompanied patient upon transfer:   Dental Appliance: Dental Appliances: None  Vision: Visual Aid: Glasses, With patient  Hearing Aid:    Jewelry:    Clothing:    Other Valuables:    Valuables sent to safe:

## 2020-05-12 NOTE — ED TRIAGE NOTES
Arrived from home, reporting right hand/wrist pain. Patient had carpal tunnel surgery 1 week ago, saw surgeon on Sunday, and reports the surgeon did an I/D to check. Also reports finger numbness, and pain radiating to elbow. Patient reports 8/10 pain. Self medicated percocet 0200.

## 2020-05-12 NOTE — PROGRESS NOTES
TRANSFER - IN REPORT:    Verbal report received from Wong (name) on Belvie Najjar  being received from ED (unit) for routine progression of care      Report consisted of patients Situation, Background, Assessment and   Recommendations(SBAR). Information from the following report(s) SBAR, Kardex, ED Summary, [facility] and Recent Results was reviewed with the receiving nurse. Opportunity for questions and clarification was provided. Assessment completed upon patients arrival to unit and care assumed.

## 2020-05-12 NOTE — BRIEF OP NOTE
Brief Postoperative Note    Patient: Maranda Childers  YOB: 1960  MRN: [id number]    Date of Procedure: 5/12/2020     Pre-Op Diagnosis: right hand infection    Post-Op Diagnosis: right hand and wrist flexor tenosynovitis      Procedure(s):  INCISION AND DRAINAGE RIGHT HAND/ / REQ. 1630    Surgeon(s):  Isidra Elliott MD    Surgical Assistant: None    Anesthesia: General     Estimated Blood Loss (mL): Minimal    Complications: None    Specimens:   ID Type Source Tests Collected by Time Destination   1 : right hand abscess #1 Wound Abscess CULTURE, WOUND W GRAM STAIN Isidra Elliott MD 5/12/2020 1643 Microbiology        Implants: * No implants in log *    Drains: * No LDAs found *    Findings: purulence in carpal canal.  Extensive flexor tenosynovitis    Electronically Signed by Rafael Ac MD on 5/12/2020 at 5:11 PM

## 2020-05-12 NOTE — ED PROVIDER NOTES
HPI     61year old male with history of anxiety, barretts esophagus, cad, dm, gerd, htn, migraines present with concerns for post op infection. He had a carpal tunnel release on 5/4 by Dr. Vikki Mitchell. Seen in the office yesterday, 5/11 with wound infection- I&D in office with po antibiotics. Presents to ED tonight c/o severe pain in the wrist and fingers. States the 10mg percocet he has been taking wont even tough it. He can't straighten his fingers due to pain. No fevers /chills. No cp/sob/cough. Tested neg for COVID 19 prior to surgery.      Past Medical History:   Diagnosis Date    Anxiety     Arrhythmia     SINUS TACHY    Langford's esophagus 3/19/2012    CAD (coronary artery disease)     Chronic pain     Depression     Diabetes mellitus ([address] 3/19/2012    GERD (gastroesophageal reflux disease)     Headache     Hearing loss     History of MI (myocardial infarction) 3-00    History of peptic ulcer disease 3/19/2012    HTN (hypertension) 3/19/2012    Memory loss     Morbid obesity ([address] 3/19/2012    2002-OPEN GASTRIC BYPASS    Other ill-defined conditions(799.89)     MIGRAINES    Psychiatric disorder     Reflux 3/19/2012    Sleep apnea, obstructive 3/19/2012    Unspecified adverse effect of anesthesia     HEADACHE    Unspecified sleep apnea     HX-PRIOR TO GASTRIC BYPASS       Past Surgical History:   Procedure Laterality Date    APPENDECTOMY      BIOPSY LIVER  9-3-02    HX APPENDECTOMY      HX CHOLECYSTECTOMY      HX GASTRIC BYPASS  9-3-02    HX HERNIA REPAIR  4.3.2012    Dr. Brittani Andersen - laparoscopic incisional hernia repair    HX TONSILLECTOMY      LAP,VAGUS NERVE,TRUNCAL      NEUROLOGICAL PROCEDURE UNLISTED  2004    CERVICAL FUSION         Family History:   Problem Relation Age of Onset    Diabetes Mother     Hypertension Mother     Obesity Mother     Emphysema Mother     Heart Disease Mother     Cancer Father         prostate    Obesity Sister     Other Sister         aneurysm  Alcohol abuse Brother     Headache Brother     Other Brother         aneurysm       Social History     Socioeconomic History    Marital status:      Spouse name: Not on file    Number of children: Not on file    Years of education: Not on file    Highest education level: Not on file   Occupational History    Not on file   Social Needs    Financial resource strain: Not on file    Food insecurity     Worry: Not on file     Inability: Not on file   [employer] needs     Medical: Not on file     Non-medical: Not on file   Tobacco Use    Smoking status: Current Every Day Smoker     Packs/day: 1.00    Smokeless tobacco: Never Used   Substance and Sexual Activity    Alcohol use: Yes     Comment: Rarely    Drug use: No    Sexual activity: Not on file   Lifestyle    Physical activity     Days per week: Not on file     Minutes per session: Not on file    Stress: Not on file   Relationships    Social connections     Talks on phone: Not on file     Gets together: Not on file     Attends [employer] service: Not on file     Active member of club or organization: Not on file     Attends meetings of clubs or organizations: Not on file     Relationship status: Not on file    Intimate partner violence     Fear of current or ex partner: Not on file     Emotionally abused: Not on file     Physically abused: Not on file     Forced sexual activity: Not on file   Other Topics Concern    Not on file   Social History Narrative    Not on file         ALLERGIES: Patient has no known allergies. Review of Systems   Constitutional: Negative for chills and fever. HENT: Negative for congestion. Eyes: Negative for visual disturbance. Respiratory: Negative for cough and shortness of breath. Cardiovascular: Negative for chest pain. Gastrointestinal: Negative for abdominal pain, nausea and vomiting. Genitourinary: Negative for dysuria. Musculoskeletal: Negative for gait problem.    Skin: Positive for wound. Neurological: Negative for headaches. Psychiatric/Behavioral: Negative for dysphoric mood. There were no vitals filed for this visit. Physical Exam  Constitutional:       General: He is not in acute distress. Appearance: He is well-developed. HENT:      Head: Normocephalic and atraumatic. Mouth/Throat:      Pharynx: No oropharyngeal exudate. Eyes:      General: No scleral icterus. Right eye: No discharge. Left eye: No discharge. Pupils: Pupils are equal, round, and reactive to light. Neck:      Musculoskeletal: Normal range of motion and neck supple. Vascular: No JVD. Cardiovascular:      Rate and Rhythm: Normal rate and regular rhythm. Heart sounds: Normal heart sounds. No murmur. Pulmonary:      Effort: Pulmonary effort is normal. No respiratory distress. Breath sounds: Normal breath sounds. No stridor. No wheezing or rales. Chest:      Chest wall: No tenderness. Abdominal:      General: Bowel sounds are normal. There is no distension. Palpations: Abdomen is soft. There is no mass. Tenderness: There is no abdominal tenderness. There is no guarding or rebound. Musculoskeletal: Normal range of motion. Skin:     General: Skin is warm and dry. Capillary Refill: Capillary refill takes less than 2 seconds. Findings: No rash. Comments: Wound to right wrist with induration but no drainage. No marked erythema or warmth. Positive swelling  Severe pain with passive ROM wrist  Unable to straighten middle fingers   Neurological:      Mental Status: He is oriented to person, place, and time. Psychiatric:         Behavior: Behavior normal.         Thought Content: Thought content normal.         Judgment: Judgment normal.          MDM       Procedures    wBC 12, lactate 3.0. sed rate elevated. Xray ok. Vanc/fluids ordered. Perfect serve texted with michael. Will admit.

## 2020-05-12 NOTE — OP NOTES
[address]   OPERATIVE REPORT    Name:  Ab Klein  MR#:  [id number]  :  1960  ACCOUNT #:  [de-identified]  DATE OF SERVICE:  2020    PREOPERATIVE DIAGNOSIS:  Right hand infection. POSTOPERATIVE DIAGNOSIS:  Right wrist flexor tenosynovitis. PROCEDURE PERFORMED:  Irrigation and debridement of right wrist flexor tenosynovitis with extensive tenosynovectomy    SURGEON:  Rafael Ac MD    ASSISTANT:  None. ANESTHESIA:  General.    COMPLICATIONS:  None. SPECIMENS REMOVED:  Cultures. IMPLANTS:  None. ESTIMATED BLOOD LOSS:  None. INDICATIONS FOR THE PROCEDURE:  This is a 51-year-old diabetic male who is now a little over a week status post endoscopic carpal tunnel release. He has developed signs and symptoms consistent with an infection. He is indicated for an I and D. I have discussed risks, benefits, potential complications and alternatives of surgery with him, and he has given informed consent to proceed. DESCRIPTION OF THE PROCEDURE:  The patient was identified in the preoperative holding area. Informed consent was obtained. The operative site was marked. He was then transported to the OR and placed supine on the operating table. All bony prominences were well padded. A tourniquet was applied to the upper brachium. After induction of deep sedation, a median nerve block was performed at the level of the distal forearm with 0.5% Marcaine. The skin in the palm was also anesthetized with 0.5% Marcaine. The right upper extremity was then sterilely prepped and draped in the usual fashion. Surgical time-out was held. The operative site was confirmed. Preoperative antibiotics were not used. After gravity exsanguination, the tourniquet was elevated to 250 mmHg. His prior incision was found to have some breakdown with some fibrinous material at the base. The skin was ellipsed around the incision and was excised.   The incision was then extended proximally and distally. Proximally, the incision was extended up the volar forearm. Distally, the incision was made in the palm overlying the carpal tunnel. Sharp dissection was performed down to the carpal tunnel. He was found to have a complete release of the transverse carpal ligament. There was purulent material within the carpal canal.  This was cultured. Proximally, the antebrachial fascia was divided to expose the flexor tendons. The median nerve was identified and was protected throughout the remainder of the case. The flexor tendons traversing the flexor canal were all debrided  of extensive tenosynovitis with tenotomy scissors. Following debridement, the wound was thoroughly irrigated with 3 liters of normal saline with bacitracin. The median nerve was again inspected and found to be in good condition. The skin was then loosely approximated with multiple interrupted 3-0 nylon sutures. Sterile dressings were applied. Tourniquet was let down and brisk cap refill returned to the digits. He was transferred to the PACU in stable condition without complication.       Marcelo Brink MD [id number]  D:  05/12/2020 17:16  T:  05/12/2020 18:13  JOB #:  [id number]

## 2020-05-12 NOTE — PROGRESS NOTES
TRANSFER - OUT REPORT:    Verbal report given to Jocelyn De Souza (name) on Lou Galdamez  being transferred to [facility] (unit) for ordered procedure       Report consisted of patients Situation, Background, Assessment and   Recommendations(SBAR). Information from the following report(s) SBAR, Kardex, Intake/Output and MAR was reviewed with the receiving nurse. Lines:   Peripheral IV 05/12/20 Left Forearm (Active)   Site Assessment Clean, dry, & intact 5/12/2020  9:08 AM   Phlebitis Assessment 0 5/12/2020  9:08 AM   Infiltration Assessment 0 5/12/2020  9:08 AM   Dressing Status Clean, dry, & intact 5/12/2020  9:08 AM   Dressing Type Transparent 5/12/2020  9:08 AM   Hub Color/Line Status Capped 5/12/2020  9:08 AM   Action Taken Open ports on tubing capped 5/12/2020  9:08 AM   Alcohol Cap Used Yes 5/12/2020  9:08 AM       Peripheral IV 05/12/20 Left Antecubital (Active)   Site Assessment Clean, dry, & intact 5/12/2020  9:08 AM   Phlebitis Assessment 0 5/12/2020  9:08 AM   Infiltration Assessment 0 5/12/2020  9:08 AM   Dressing Status Clean, dry, & intact 5/12/2020  9:08 AM   Dressing Type Transparent 5/12/2020  9:08 AM   Hub Color/Line Status Infusing 5/12/2020  9:08 AM   Action Taken Open ports on tubing capped 5/12/2020  9:08 AM   Alcohol Cap Used Yes 5/12/2020  9:08 AM        Opportunity for questions and clarification was provided.       Patient transported with:   [employer]

## 2020-05-12 NOTE — PROGRESS NOTES
TRANSFER - IN REPORT:    Verbal report received from KANWALRN(name) on Car Parham  being received from [facility](unit) for ordered procedure      Report consisted of patients Situation, Background, Assessment and   Recommendations(SBAR). Information from the following report(s) SBAR, Kardex, Intake/Output, MAR, Recent Results and Pre Procedure Checklist was reviewed with the receiving nurse. Opportunity for questions and clarification was provided. Assessment completed upon patients arrival to unit and care assumed.

## 2020-05-12 NOTE — CDMP QUERY
* new question Pt admitted with possible Cellulitis right wrist. Pt noted to have  on admission, with WBC 12.1, Lactic Acid 3.0, and CRP is elevated at 7.4. If possible, please document in the progress notes and d/c summary if you are evaluating and / or treating any of the followin. Sepsis, present on admission, suspected or probable causative organism (please specify) 2. Sepsis was ruled out (include corresponding diagnosis for patients clinical picture and treatment) 3. SIRS without Sepsis 4. Other explanation, please specify 5. Clinically unable to determine The medical record reflects the following: 
   Risk Factors: hx of DM, s/p Carpal Tunnel release Clinical Indicators: , WBC 12.1, Lactic Acid 3.0, CRP elevated at 7.4. Noted to have possible Cellulitis of wrist, increase pain/ swelling/ redness. Treatment: 1L NS IV Bolus, followed by 100 cc/hr, Vanc IV.  
 
Thank you, Мария ISAACS

## 2020-05-12 NOTE — PROGRESS NOTES
TRANSFER - IN REPORT:    Verbal report received from Jocelyn De Souza (name) on Sidney Farley  being received from PACU (unit) for routine post - op      Report consisted of patients Situation, Background, Assessment and   Recommendations(SBAR). Information from the following report(s) SBAR, Kardex, OR Summary and MAR was reviewed with the receiving nurse. Opportunity for questions and clarification was provided. Assessment completed upon patients arrival to unit and care assumed.

## 2020-05-12 NOTE — PROGRESS NOTES
Pharmacist Note - Vancomycin Dosing    Consult provided for this 61 y.o. male for indication of cellulitis of right hand s/p /p right carpal tunnel release 20   Antibiotic regimen(s): vancomycin  Patient on vancomycin PTA? NO     Recent Labs     20  0438   WBC 12.1*   CREA 0.89   BUN 9     Frequency of BMP: tomorrow  Height: 183 cm  Weight: 76 kg  Est CrCl: 96 ml/min; UO: unmeasured  Temp (24hrs), Av.7 °F (36.5 °C), Min:97.4 °F (36.3 °C), Max:98 °F (36.7 °C)    Cultures:   blood, pending   wound drainage, pending    Goal trough = 10 - 15 mcg/mL    Therapy will be initiated with a loading dose of 1750 mg IV x 1 to be followed by a maintenance dose of 1250 mg IV every 12 hours. Pharmacy to follow patient daily and order levels / make dose adjustments as appropriate.

## 2020-05-12 NOTE — PROGRESS NOTES
Admission Medication Reconciliation:    Information obtained from:  patient, chart review  RxQuery data available¹:  YES    Comments/Recommendations: All medications/allergies have been reviewed and updated; the patient was an excellent historian and could confirm all current medications, doses, and frequencies. Changes made to Prior to Admission (PTA) Medication List:   ?   Medications Added:   - trazodone, diclofenac gel, meloxicam   ?   Medications Changed:   - propranolol, added dose  - aspirin, added dose  - calcium/D, added dose  - lamotrigine, added dose 200 mg BID  - metoclopramide, updated dose from 10 mg QID to 10 mg BID  - metoprolol, updated dosage form and dose  - Percocet; added frequency  ? Medications Removed:   - Cymbalta     ¹RxQuery pharmacy benefit data reflects medications filled and processed through the patient's insurance, however   this data does NOT capture whether the medication was picked up or is currently being taken by the patient. Allergies:  Patient has no known allergies.     Significant PMH/Disease States:   Past Medical History:   Diagnosis Date    Anxiety     Arrhythmia     SINUS TACHY    Langford's esophagus 3/19/2012    CAD (coronary artery disease)     Chronic pain     Depression     Diabetes mellitus ([address] 3/19/2012    GERD (gastroesophageal reflux disease)     Headache     Hearing loss     History of MI (myocardial infarction) 3-00    History of peptic ulcer disease 3/19/2012    HTN (hypertension) 3/19/2012    Memory loss     Morbid obesity [address] [address] 3/19/2012    2002-OPEN GASTRIC BYPASS    Other ill-defined conditions(799.89)     MIGRAINES    Psychiatric disorder     Reflux 3/19/2012    Sleep apnea, obstructive 3/19/2012    Unspecified adverse effect of anesthesia     HEADACHE    Unspecified sleep apnea     HX-PRIOR TO GASTRIC BYPASS       Chief Complaint for this Admission:    Chief Complaint   Patient presents with    Hand Pain       Prior to Admission Medications: Prior to Admission Medications   Prescriptions Last Dose Informant Patient Reported? Taking? CALCIUM CITRATE/VITAMIN D3 (CALCIUM CITRATE + D PO)   Yes No   Sig: Take 1 Tab by mouth daily. PROPRANOLOL HCL (INDERAL PO)   Yes No   Sig: Take  by mouth. 10 MG NIGHTLY   SUMAtriptan succinate 6 mg/0.5 mL kit   No No   Sig: INJECT 0.5 ML ONCE AS NEEDED FOR MIGRAINE UP TO 1 DOSE   VITAMIN A/VITAMIN D2/COD LIVER (VITAMINS A & D PO)   Yes No   Sig: Take  by mouth. aspirin delayed-release 81 mg tablet   Yes No   Sig: Take 81 mg by mouth daily. cyanocobalamin (VITAMIN B-12) 500 mcg tablet   Yes No   Sig: Take 500 mcg by mouth daily. diclofenac (VOLTAREN) 1 % gel   Yes Yes   Sig: Apply 2 g to affected area four (4) times daily as needed for Pain (apply to hand). dihydroergotamine (MIGRANAL) 0.5 mg/pump act. (4 mg/mL) nasal spray   No No   Sig: One spray in each nostril and repeat in 15 minutes X 1 . No more then 4 sprays in 24 hours   galcanezumab-gnlm (EMGALITY PEN) 120 mg/mL injection   No No   Si mL by SubCUTAneous route every thirty (30) days. Monthly Rx to be filled starting 2019--Fill 240mg loading dose today   galcanezumab-gnlm (Emgality Pen) 120 mg/mL injection   No No   Si mL by SubCUTAneous route every thirty (30) days. lamoTRIgine (LAMICTAL) 100 mg tablet   Yes No   Sig: Take 200 mg by mouth two (2) times a day. lansoprazole (PREVACID) 30 mg capsule   Yes No   Sig: Take  by mouth two (2) times a day. metoclopramide (REGLAN) 10 mg tablet   Yes No   Sig: Take 10 mg by mouth two (2) times a day. metoprolol succinate (TOPROL-XL) 25 mg XL tablet   Yes Yes   Sig: Take 25 mg by mouth two (2) times a day. multivitamin (ONE A DAY) tablet   Yes No   Sig: Take 1 Tab by mouth daily. oxyCODONE-acetaminophen (PERCOCET 10)  mg per tablet   Yes No   Sig: Take 1 Tab by mouth every eight (8) hours as needed for Pain.    traZODone (DESYREL) 100 mg tablet   Yes Yes   Sig: Take 100 mg by mouth nightly. Facility-Administered Medications: None       Thank you for allowing pharmacy to participate in the coordination of this patient's care. If you have any other questions, please contact the medication reconciliation pharmacist at x [phone]. Lilia Mesa, Pharm. D., BCPS

## 2020-05-12 NOTE — ANESTHESIA PREPROCEDURE EVALUATION
Relevant Problems   No relevant active problems       Anesthetic History   No history of anesthetic complications            Review of Systems / Medical History  Patient summary reviewed, nursing notes reviewed and pertinent labs reviewed    Pulmonary  Within defined limits      Sleep apnea           Neuro/Psych   Within defined limits           Cardiovascular  Within defined limits  Hypertension        Dysrhythmias   CAD         GI/Hepatic/Renal  Within defined limits   GERD           Endo/Other  Within defined limits  Diabetes         Other Findings              Physical Exam    Airway  Mallampati: II  TM Distance: > 6 cm  Neck ROM: normal range of motion   Mouth opening: Normal     Cardiovascular  Regular rate and rhythm,  S1 and S2 normal,  no murmur, click, rub, or gallop             Dental  No notable dental hx       Pulmonary  Breath sounds clear to auscultation               Abdominal  GI exam deferred       Other Findings            Anesthetic Plan    ASA: 3  Anesthesia type: general          Induction: Intravenous  Anesthetic plan and risks discussed with: Patient

## 2020-05-12 NOTE — ROUTINE PROCESS
Patient: Dai Wells MRN: [id number]  SSN: [id number] YOB: 1960  Age: 61 y.o. Sex: male Patient is status post Procedure(s): 
INCISION AND DRAINAGE RIGHT HAND/ / REQ. 1630. Surgeon(s) and Role: 
   * Rome Bruce MD - Primary Local/Dose/Irrigation:  See Radha Carrasquillo Peripheral IV 05/12/20 Left Forearm (Active) Site Assessment Clean, dry, & intact 5/12/2020  9:08 AM  
Phlebitis Assessment 0 5/12/2020  9:08 AM  
Infiltration Assessment 0 5/12/2020  9:08 AM  
Dressing Status Clean, dry, & intact 5/12/2020  9:08 AM  
Dressing Type Transparent 5/12/2020  9:08 AM  
Hub Color/Line Status Capped 5/12/2020  9:08 AM  
Action Taken Open ports on tubing capped 5/12/2020  9:08 AM  
Alcohol Cap Used Yes 5/12/2020  9:08 AM  
   
Peripheral IV 05/12/20 Left Antecubital (Active) Site Assessment Clean, dry, & intact 5/12/2020  9:08 AM  
Phlebitis Assessment 0 5/12/2020  9:08 AM  
Infiltration Assessment 0 5/12/2020  9:08 AM  
Dressing Status Clean, dry, & intact 5/12/2020  9:08 AM  
Dressing Type Transparent 5/12/2020  9:08 AM  
Hub Color/Line Status Infusing 5/12/2020  9:08 AM  
Action Taken Open ports on tubing capped 5/12/2020  9:08 AM  
Alcohol Cap Used Yes 5/12/2020  9:08 AM  
                 
 
 
 
Dressing/Packing:  Wound Wrist Right-Dressing Type: 4 x 4;ABD pad;Cast padding;Elastic bandage; Sutures; Xeroform (05/12/20 1701) Splint/Cast:  ] Other:

## 2020-05-12 NOTE — PROGRESS NOTES
Patient seen and examined. He is now 8 days status post right endoscopic carpal tunnel release. He was seen in the office yesterday with pain and swelling in the hand. At that time he had some erythema around the wound. His sutures were removed, and the wound was opened. No purulence was encountered. Cultures were taken. Those cultures are pending. He was put on Bactrim. He reports he took 2 doses of Bactrim yesterday. He then presented to the ER early this morning complaining of worsening swelling and pain. White count was found to be 12. He is afebrile. There was some concern for infection. He was therefore admitted. He has now gotten a dose of vancomycin. He continues to complain of significant pain in the palm. He complains of pain with digital motion. Of note he does have a history of chronic narcotic use. He takes oxycodone 10 mg tablets. He now also provides additional history. He reports that over the weekend he had removed his surgical dressing and was fixing an HVAC unit. He recalls striking the wrist on several occasions. Shortly thereafter he had worsening pain and swelling. He also reports a similar episode of significant swelling and pain in the hand in the fall. He was admitted to [facility] for IV antibiotics for 4 or 5 days. This slowly resolved without any surgical intervention. He also has had several other episodes of swelling and pain in the hand and wrist which were thought to be due to gout or pseudogout. Upon exam today he does have significantly increased swelling in the hand compared to yesterday. His wound is open and draining clear fluid. There is no streaking. There is no significant cellulitis. He has significant tenderness in the palm. He holds the digits in a slightly flexed posture. He has no tenderness along the flexor sheath of any digits. There are no areas of fluctuance.      It is not completely clear what the etiology for his swelling is. This could be related to his activity over the weekend. However I do have mild concern for deep space infection, therefore  My recommendation is surgical debridement. He will be taken to the OR later this afternoon for an open carpal tunnel release and exploration. He will remain NPO. Continued vancomycin in the interim. Strict ice and elevation. His pain control will be difficult given his chronic oxycodone use.

## 2020-05-12 NOTE — H&P
ORTHO H&P    Date of Consultation:  May 12, 2020  Referring Physician:  Cookie Paige  CC: right hand/wrist pain    HPI:  Jesus Rivas is a 61 y.o. male s/p right carpal tunnel release 5/4/20 (Dr. Claudia Swanson) who c/o increasing right hand/wrist pain for several days. After his surgery, he struck his uncovered incision performing HVAC repair 4 days ago. He developed increasing pain despite his chronic oxycodone 10 mg (migraines) prompting office visit 5/11/20. Dr. Jordyn Luciano performed bedside I&D with culture but no purulence was encountered. He started PO Bactrim yesterday but presented to ER overnight for worsening pain in volar wrist, thumb, and pinky. He c/o numbness middle and index fingers. His pain is worse with wrist motion and digit extension. He denies fever, streaking, and blood sugar greater than 110.     Past Medical History:   Diagnosis Date    Anxiety     Arrhythmia     SINUS TACHY    Langford's esophagus 3/19/2012    CAD (coronary artery disease)     Chronic pain     Depression     Diabetes mellitus ([address] 3/19/2012    GERD (gastroesophageal reflux disease)     Headache     Hearing loss     History of MI (myocardial infarction) 3-00    History of peptic ulcer disease 3/19/2012    HTN (hypertension) 3/19/2012    Memory loss     Morbid obesity ([address] 3/19/2012    2002-OPEN GASTRIC BYPASS    Other ill-defined conditions(799.89)     MIGRAINES    Psychiatric disorder     Reflux 3/19/2012    Sleep apnea, obstructive 3/19/2012    Unspecified adverse effect of anesthesia     HEADACHE    Unspecified sleep apnea     HX-PRIOR TO GASTRIC BYPASS      Past Surgical History:   Procedure Laterality Date    APPENDECTOMY      BIOPSY LIVER  9-3-02    HX APPENDECTOMY      HX CHOLECYSTECTOMY      HX GASTRIC BYPASS  9-3-02    HX HERNIA REPAIR  4.3.2012    Dr. Aguilar Mon - laparoscopic incisional hernia repair    HX TONSILLECTOMY      LAP,VAGUS NERVE,TRUNCAL      NEUROLOGICAL PROCEDURE UNLISTED  2004 CERVICAL FUSION      Family History   Problem Relation Age of Onset    Diabetes Mother     Hypertension Mother     Obesity Mother     Emphysema Mother     Heart Disease Mother     Cancer Father         prostate    Obesity Sister     Other Sister         aneurysm    Alcohol abuse Brother     Headache Brother     Other Brother         aneurysm      Social History     Tobacco Use    Smoking status: Current Every Day Smoker     Packs/day: 1.00    Smokeless tobacco: Never Used   Substance Use Topics    Alcohol use: Yes     Comment: Rarely          No Known Allergies     Review of Systems:  Per HPI. Objective:     Patient Vitals for the past 8 hrs:   BP Temp Pulse Resp SpO2 Height Weight   20 0531      6' (1.829 m) 76.2 kg (168 lb)   20 0414 149/65 98 °F (36.7 °C) (!) 122 16 96 %       Temp (24hrs), Av °F (36.7 °C), Min:98 °F (36.7 °C), Max:98 °F (36.7 °C)      EXAM:   NAD. Answers questions appropriately. Moves LUE spontaneously. Right hand volar incision has 1cm superficial wound with serous drainage and surrounding erythema. There is focal edema volar wrist but also edema in palm, digits, and extending into forearm. There is no obvious streaking. He has pain with attempted active wrist motion. Fist motion is limited somewhat. Holds digits in flexed position and increase in pain with passive extension all digits. CR brisk each digit. He moves right elbow without difficulty. Imaging Review:   Results from Hospital Encounter encounter on 20   XR WRIST RT AP/LAT    Narrative EXAM: XR WRIST RT AP/LAT    INDICATION: post op wound infection. COMPARISON: None. FINDINGS: Two views of the right wrist demonstrate no fracture or other acute  osseous or articular abnormality. The soft tissues are within normal limits. Impression IMPRESSION: No acute abnormality. No results found for this or any previous visit.     Labs:   WBC 12.1, ESR 10, CRP 7. 4.  . Est GFR > 60. Impression:     Patient Active Problem List    Diagnosis Date Noted    Cellulitis of right wrist 05/12/2020    Migraine without status migrainosus, not intractable 03/29/2016    Cellulitis 04/19/2012    S/P laparoscopic hernia repair 04/04/2012    Other and unspecified postsurgical nonabsorption 03/29/2012    Diabetes mellitus ([address] 03/19/2012    HTN (hypertension) 03/19/2012    Sleep apnea, obstructive 03/19/2012    Langford's esophagus 03/19/2012    Reflux 03/19/2012    History of peptic ulcer disease 03/19/2012    History of MI (myocardial infarction)      Active Problems:    Cellulitis of right wrist (5/12/2020)        Plan:   D/W Dr. Suyapa Morales. I took culture of drainage and applied sterile wet -> dry dressing. Will admit for IV abx and elevation. Keep NPO in case need for formal I&D in OR. Analgesics - Percocet/Toradol. Dr. Natasha Pitt is aware and agrees with above plan.       MARY Andino  [facility]  [address] [place]

## 2020-05-12 NOTE — ANESTHESIA POSTPROCEDURE EVALUATION
Procedure(s):  INCISION AND DRAINAGE RIGHT HAND/ / REQ. 1630. MAC    Anesthesia Post Evaluation        Patient location during evaluation: PACU  Patient participation: complete - patient participated  Level of consciousness: awake and alert  Pain management: adequate  Airway patency: patent  Anesthetic complications: no  Cardiovascular status: acceptable  Respiratory status: acceptable  Hydration status: acceptable  Comments: I have seen and evaluated the patient and is ready for discharge. Mendoza Soni MD    Post anesthesia nausea and vomiting:  none      Vitals Value Taken Time   /66 5/12/2020  5:35 PM   Temp 36.3 °C (97.3 °F) 5/12/2020  5:21 PM   Pulse 71 5/12/2020  5:39 PM   Resp 19 5/12/2020  5:39 PM   SpO2 99 % 5/12/2020  5:39 PM   Vitals shown include unvalidated device data.

## 2020-05-12 NOTE — PROGRESS NOTES
Primary Nurse Krystin Ivan and Ashley Stevenson., RN performed a dual skin assessment on this patient No impairment noted  Mason score is 23      Patient has wound on right wrist. Wound care changed dressing today.

## 2020-05-13 LAB
ANION GAP SERPL CALC-SCNC: 4 MMOL/L (ref 5–15)
ANION GAP SERPL CALC-SCNC: 6 MMOL/L (ref 5–15)
BASOPHILS # BLD: 0 K/UL (ref 0–0.1)
BASOPHILS NFR BLD: 0 % (ref 0–1)
BUN SERPL-MCNC: 12 MG/DL (ref 6–20)
BUN SERPL-MCNC: 12 MG/DL (ref 6–20)
BUN/CREAT SERPL: 21 (ref 12–20)
BUN/CREAT SERPL: 21 (ref 12–20)
CALCIUM SERPL-MCNC: 8.2 MG/DL (ref 8.5–10.1)
CALCIUM SERPL-MCNC: 8.4 MG/DL (ref 8.5–10.1)
CHLORIDE SERPL-SCNC: 106 MMOL/L (ref 97–108)
CHLORIDE SERPL-SCNC: 107 MMOL/L (ref 97–108)
CO2 SERPL-SCNC: 24 MMOL/L (ref 21–32)
CO2 SERPL-SCNC: 25 MMOL/L (ref 21–32)
CREAT SERPL-MCNC: 0.56 MG/DL (ref 0.7–1.3)
CREAT SERPL-MCNC: 0.57 MG/DL (ref 0.7–1.3)
DIFFERENTIAL METHOD BLD: ABNORMAL
EOSINOPHIL # BLD: 0.2 K/UL (ref 0–0.4)
EOSINOPHIL NFR BLD: 2 % (ref 0–7)
ERYTHROCYTE [DISTWIDTH] IN BLOOD BY AUTOMATED COUNT: 13.8 % (ref 11.5–14.5)
GLUCOSE BLD STRIP.AUTO-MCNC: 113 MG/DL (ref 65–100)
GLUCOSE BLD STRIP.AUTO-MCNC: 148 MG/DL (ref 65–100)
GLUCOSE BLD STRIP.AUTO-MCNC: 151 MG/DL (ref 65–100)
GLUCOSE BLD STRIP.AUTO-MCNC: 93 MG/DL (ref 65–100)
GLUCOSE SERPL-MCNC: 119 MG/DL (ref 65–100)
GLUCOSE SERPL-MCNC: 83 MG/DL (ref 65–100)
HCT VFR BLD AUTO: 34 % (ref 36.6–50.3)
HGB BLD-MCNC: 10.6 G/DL (ref 12.1–17)
IMM GRANULOCYTES # BLD AUTO: 0 K/UL (ref 0–0.04)
IMM GRANULOCYTES NFR BLD AUTO: 0 % (ref 0–0.5)
LYMPHOCYTES # BLD: 1 K/UL (ref 0.8–3.5)
LYMPHOCYTES NFR BLD: 11 % (ref 12–49)
MCH RBC QN AUTO: 27.8 PG (ref 26–34)
MCHC RBC AUTO-ENTMCNC: 31.2 G/DL (ref 30–36.5)
MCV RBC AUTO: 89.2 FL (ref 80–99)
MONOCYTES # BLD: 0.6 K/UL (ref 0–1)
MONOCYTES NFR BLD: 7 % (ref 5–13)
NEUTS SEG # BLD: 7 K/UL (ref 1.8–8)
NEUTS SEG NFR BLD: 80 % (ref 32–75)
NRBC # BLD: 0 K/UL (ref 0–0.01)
NRBC BLD-RTO: 0 PER 100 WBC
PLATELET # BLD AUTO: 201 K/UL (ref 150–400)
PMV BLD AUTO: 10.1 FL (ref 8.9–12.9)
POTASSIUM SERPL-SCNC: 4 MMOL/L (ref 3.5–5.1)
POTASSIUM SERPL-SCNC: 4.2 MMOL/L (ref 3.5–5.1)
RBC # BLD AUTO: 3.81 M/UL (ref 4.1–5.7)
SERVICE CMNT-IMP: ABNORMAL
SERVICE CMNT-IMP: NORMAL
SODIUM SERPL-SCNC: 136 MMOL/L (ref 136–145)
SODIUM SERPL-SCNC: 136 MMOL/L (ref 136–145)
WBC # BLD AUTO: 8.8 K/UL (ref 4.1–11.1)

## 2020-05-13 PROCEDURE — 82962 GLUCOSE BLOOD TEST: CPT

## 2020-05-13 PROCEDURE — 80048 BASIC METABOLIC PNL TOTAL CA: CPT

## 2020-05-13 PROCEDURE — 74011250637 HC RX REV CODE- 250/637: Performed by: ORTHOPAEDIC SURGERY

## 2020-05-13 PROCEDURE — 74011250636 HC RX REV CODE- 250/636: Performed by: ORTHOPAEDIC SURGERY

## 2020-05-13 PROCEDURE — 65270000029 HC RM PRIVATE

## 2020-05-13 PROCEDURE — 36415 COLL VENOUS BLD VENIPUNCTURE: CPT

## 2020-05-13 PROCEDURE — 74011000258 HC RX REV CODE- 258: Performed by: ORTHOPAEDIC SURGERY

## 2020-05-13 PROCEDURE — 85025 COMPLETE CBC W/AUTO DIFF WBC: CPT

## 2020-05-13 RX ADMIN — ASPIRIN 81 MG: 81 TABLET, COATED ORAL at 08:22

## 2020-05-13 RX ADMIN — LAMOTRIGINE 200 MG: 100 TABLET ORAL at 08:22

## 2020-05-13 RX ADMIN — METOPROLOL TARTRATE 25 MG: 25 TABLET, FILM COATED ORAL at 08:23

## 2020-05-13 RX ADMIN — KETOROLAC TROMETHAMINE 15 MG: 30 INJECTION, SOLUTION INTRAMUSCULAR at 06:36

## 2020-05-13 RX ADMIN — METOCLOPRAMIDE 10 MG: 10 TABLET ORAL at 06:37

## 2020-05-13 RX ADMIN — PIPERACILLIN AND TAZOBACTAM 3.38 G: 3; .375 INJECTION, POWDER, LYOPHILIZED, FOR SOLUTION INTRAVENOUS at 11:09

## 2020-05-13 RX ADMIN — OYSTER SHELL CALCIUM WITH VITAMIN D 1 TABLET: 500; 200 TABLET, FILM COATED ORAL at 08:23

## 2020-05-13 RX ADMIN — CYANOCOBALAMIN TAB 500 MCG 500 MCG: 500 TAB at 08:23

## 2020-05-13 RX ADMIN — KETOROLAC TROMETHAMINE 15 MG: 30 INJECTION, SOLUTION INTRAMUSCULAR at 00:12

## 2020-05-13 RX ADMIN — VANCOMYCIN HYDROCHLORIDE 1250 MG: 10 INJECTION, POWDER, LYOPHILIZED, FOR SOLUTION INTRAVENOUS at 18:30

## 2020-05-13 RX ADMIN — PANTOPRAZOLE SODIUM 20 MG: 20 TABLET, DELAYED RELEASE ORAL at 19:38

## 2020-05-13 RX ADMIN — PANTOPRAZOLE SODIUM 20 MG: 20 TABLET, DELAYED RELEASE ORAL at 06:37

## 2020-05-13 RX ADMIN — OXYCODONE HYDROCHLORIDE AND ACETAMINOPHEN 1 TABLET: 10; 325 TABLET ORAL at 18:54

## 2020-05-13 RX ADMIN — PROPRANOLOL HYDROCHLORIDE 10 MG: 10 TABLET ORAL at 21:25

## 2020-05-13 RX ADMIN — OXYCODONE HYDROCHLORIDE AND ACETAMINOPHEN 1 TABLET: 10; 325 TABLET ORAL at 03:15

## 2020-05-13 RX ADMIN — MORPHINE SULFATE 4 MG: 2 INJECTION, SOLUTION INTRAMUSCULAR; INTRAVENOUS at 16:12

## 2020-05-13 RX ADMIN — VANCOMYCIN HYDROCHLORIDE 1250 MG: 10 INJECTION, POWDER, LYOPHILIZED, FOR SOLUTION INTRAVENOUS at 06:37

## 2020-05-13 RX ADMIN — METOCLOPRAMIDE 10 MG: 10 TABLET ORAL at 19:38

## 2020-05-13 RX ADMIN — PIPERACILLIN AND TAZOBACTAM 3.38 G: 3; .375 INJECTION, POWDER, LYOPHILIZED, FOR SOLUTION INTRAVENOUS at 19:02

## 2020-05-13 RX ADMIN — TRAZODONE HYDROCHLORIDE 100 MG: 100 TABLET ORAL at 21:25

## 2020-05-13 RX ADMIN — OXYCODONE HYDROCHLORIDE AND ACETAMINOPHEN 1 TABLET: 10; 325 TABLET ORAL at 23:10

## 2020-05-13 RX ADMIN — IBUPROFEN 600 MG: 400 TABLET ORAL at 08:23

## 2020-05-13 RX ADMIN — METOPROLOL TARTRATE 25 MG: 25 TABLET, FILM COATED ORAL at 18:54

## 2020-05-13 RX ADMIN — MORPHINE SULFATE 4 MG: 2 INJECTION, SOLUTION INTRAMUSCULAR; INTRAVENOUS at 06:42

## 2020-05-13 RX ADMIN — LAMOTRIGINE 200 MG: 100 TABLET ORAL at 21:24

## 2020-05-13 RX ADMIN — IBUPROFEN 600 MG: 400 TABLET ORAL at 21:25

## 2020-05-13 RX ADMIN — PIPERACILLIN AND TAZOBACTAM 3.38 G: 3; .375 INJECTION, POWDER, LYOPHILIZED, FOR SOLUTION INTRAVENOUS at 03:13

## 2020-05-13 NOTE — ROUTINE PROCESS
Bedside shift change report given to Hector Aranda (oncoming nurse) by Elvia Curling (offgoing nurse). Report included the following information SBAR.

## 2020-05-13 NOTE — PROGRESS NOTES
Problem: Falls - Risk of  Goal: *Absence of Falls  Description: Document Anam Tang Fall Risk and appropriate interventions in the flowsheet.   Outcome: Progressing Towards Goal  Note: Fall Risk Interventions:    Medication Interventions: Patient to call before getting OOB, Teach patient to arise slowly, Evaluate medications/consider consulting pharmacy

## 2020-05-13 NOTE — PROGRESS NOTES
CDMP query    2.  Sepsis was ruled out (include corresponding diagnosis for patients clinical picture and treatment)    Right wrist flexor tenosynovitis

## 2020-05-13 NOTE — PROGRESS NOTES
Bedside and Verbal shift change report given to Jeremi Pratt (oncoming nurse) by Martinez Acharya (offgoing nurse). Report included the following information SBAR, Kardex, OR Summary and MAR.

## 2020-05-13 NOTE — PROGRESS NOTES
ADRIAN:    RUR 8%    Patient lives at home with his wife and plans to return there at discharge. IV abx, cultures pending. Care Management Interventions  PCP Verified by CM: Yes  Mode of Transport at Discharge: Other (see comment)(Wife will drive home.)  Transition of Care Consult (CM Consult): Discharge Planning  MyChart Signup: Yes  Discharge Durable Medical Equipment: No  Physical Therapy Consult: No  Occupational Therapy Consult: No  Current Support Network: Lives with Spouse  Confirm Follow Up Transport: Family  Discharge Location  Discharge Placement: Home with family assistance    Reason for Admission:   Right hand infection                   RUR Score:                   8%  Plan for utilizing home health:          PCP: First and Last name:  Scot Miller   Name of Practice:    Are you a current patient: Yes/No: Yes   Approximate date of last visit:                   4/20/2020  Current Advanced Directive/Advance Care Plan: Not on file                         Transition of Care Plan:         CM met with patient to introduce CM role, verify demographics and begin discharge planning. Patient lives in a one story house with his wife. He does not own any DME. Patient gets his prescriptions filled at [address] [place] [address]. CM verified patient's insurance as Medicare and [employer] secondary. Patient's wife, Lexus Dong will take him home at discharge.         Logan Mccabe, FERNY/CRM

## 2020-05-13 NOTE — CDMP QUERY
*5/13 new question Pt admitted with Rt Wrist Tenosyovitis. Pt with hx of DM, and noted to be s/p Carpal Tunnel release on 5/4. Pt also reports that he removed his dressing and performed work on HVAC unit over the weekend. If possible, please document in progress notes and d/c summary: 1. Rt Wrist Tenosynovitis as a Post-Operative complication 2. Rt Wrist Tenosynovitis related to underlying condition of DM, or incidental risk factor (pt removing drsg while performing HVAC work) occurring after surgery and not a complication 3. Other, please specify 4. Unable to determine The medical record reflects the following: 
               Risk Factors: hx of DM, pt removed surgical drsg a few days post-op and performed HVAC work Clinical indicators: increased pain, redness, swelling of wrist. WBC 12.1, Lactic Acid and CRP both elevated, found to have Tenosynovitis Treatment: Tenosynovectomy was performed, Vanc IV.   
 
Thank you, Мария ISAACS

## 2020-05-13 NOTE — PROGRESS NOTES
POD#1 extensive flexor tenosynovectomy  Pain much improved from preop. Numbness improved    Afebrile  VSS  Dressing changed  Wound intact. No purulent drainage.     Active Digital motion improved  Swelling improved  Minimal erythema    Labs stable  Leukocytosis resolved  Cultures pending    Plan:  vanc and zosyn pending cultures  Ice and elevation

## 2020-05-14 LAB
ANION GAP SERPL CALC-SCNC: 5 MMOL/L (ref 5–15)
BACTERIA SPEC CULT: ABNORMAL
BACTERIA SPEC CULT: ABNORMAL
BACTERIA SPEC CULT: NORMAL
BUN SERPL-MCNC: 8 MG/DL (ref 6–20)
BUN/CREAT SERPL: 15 (ref 12–20)
CALCIUM SERPL-MCNC: 8.4 MG/DL (ref 8.5–10.1)
CHLORIDE SERPL-SCNC: 104 MMOL/L (ref 97–108)
CO2 SERPL-SCNC: 27 MMOL/L (ref 21–32)
CREAT SERPL-MCNC: 0.54 MG/DL (ref 0.7–1.3)
DATE LAST DOSE: NORMAL
GLUCOSE BLD STRIP.AUTO-MCNC: 101 MG/DL (ref 65–100)
GLUCOSE BLD STRIP.AUTO-MCNC: 127 MG/DL (ref 65–100)
GLUCOSE BLD STRIP.AUTO-MCNC: 133 MG/DL (ref 65–100)
GLUCOSE BLD STRIP.AUTO-MCNC: 153 MG/DL (ref 65–100)
GLUCOSE SERPL-MCNC: 115 MG/DL (ref 65–100)
GRAM STN SPEC: ABNORMAL
POTASSIUM SERPL-SCNC: 3.3 MMOL/L (ref 3.5–5.1)
REPORTED DOSE,DOSE: NORMAL UNITS
REPORTED DOSE/TIME,TMG: NORMAL
SERVICE CMNT-IMP: ABNORMAL
SERVICE CMNT-IMP: NORMAL
SODIUM SERPL-SCNC: 136 MMOL/L (ref 136–145)
VANCOMYCIN TROUGH SERPL-MCNC: 6.5 UG/ML (ref 5–10)

## 2020-05-14 PROCEDURE — 65270000029 HC RM PRIVATE

## 2020-05-14 PROCEDURE — 74011250637 HC RX REV CODE- 250/637: Performed by: ORTHOPAEDIC SURGERY

## 2020-05-14 PROCEDURE — 74011000258 HC RX REV CODE- 258: Performed by: ORTHOPAEDIC SURGERY

## 2020-05-14 PROCEDURE — 36415 COLL VENOUS BLD VENIPUNCTURE: CPT

## 2020-05-14 PROCEDURE — 80048 BASIC METABOLIC PNL TOTAL CA: CPT

## 2020-05-14 PROCEDURE — 74011250636 HC RX REV CODE- 250/636: Performed by: ORTHOPAEDIC SURGERY

## 2020-05-14 PROCEDURE — 74011000250 HC RX REV CODE- 250: Performed by: ORTHOPAEDIC SURGERY

## 2020-05-14 PROCEDURE — 80202 ASSAY OF VANCOMYCIN: CPT

## 2020-05-14 PROCEDURE — 82962 GLUCOSE BLOOD TEST: CPT

## 2020-05-14 RX ORDER — SUMATRIPTAN 6 MG/.5ML
6 INJECTION, SOLUTION SUBCUTANEOUS AS NEEDED
Status: DISCONTINUED | OUTPATIENT
Start: 2020-05-14 | End: 2020-05-14 | Stop reason: CLARIF

## 2020-05-14 RX ORDER — IBUPROFEN 200 MG
1 TABLET ORAL EVERY EVENING
Status: DISCONTINUED | OUTPATIENT
Start: 2020-05-14 | End: 2020-05-16 | Stop reason: HOSPADM

## 2020-05-14 RX ORDER — CEFAZOLIN SODIUM/WATER 2 G/20 ML
2 SYRINGE (ML) INTRAVENOUS EVERY 8 HOURS
Status: DISCONTINUED | OUTPATIENT
Start: 2020-05-14 | End: 2020-05-16 | Stop reason: HOSPADM

## 2020-05-14 RX ORDER — SUMATRIPTAN 6 MG/.5ML
6 INJECTION, SOLUTION SUBCUTANEOUS AS NEEDED
Status: DISCONTINUED | OUTPATIENT
Start: 2020-05-14 | End: 2020-05-16 | Stop reason: HOSPADM

## 2020-05-14 RX ADMIN — VANCOMYCIN HYDROCHLORIDE 1250 MG: 10 INJECTION, POWDER, LYOPHILIZED, FOR SOLUTION INTRAVENOUS at 06:54

## 2020-05-14 RX ADMIN — LAMOTRIGINE 200 MG: 100 TABLET ORAL at 09:29

## 2020-05-14 RX ADMIN — IBUPROFEN 600 MG: 400 TABLET ORAL at 06:54

## 2020-05-14 RX ADMIN — METOCLOPRAMIDE 10 MG: 10 TABLET ORAL at 17:12

## 2020-05-14 RX ADMIN — IBUPROFEN 600 MG: 400 TABLET ORAL at 21:34

## 2020-05-14 RX ADMIN — METOPROLOL TARTRATE 25 MG: 25 TABLET, FILM COATED ORAL at 09:29

## 2020-05-14 RX ADMIN — OXYCODONE HYDROCHLORIDE AND ACETAMINOPHEN 1 TABLET: 10; 325 TABLET ORAL at 12:19

## 2020-05-14 RX ADMIN — SUMATRIPTAN 6 MG: 6 INJECTION, SOLUTION SUBCUTANEOUS at 12:16

## 2020-05-14 RX ADMIN — OYSTER SHELL CALCIUM WITH VITAMIN D 1 TABLET: 500; 200 TABLET, FILM COATED ORAL at 09:30

## 2020-05-14 RX ADMIN — CEFAZOLIN SODIUM 2 G: 300 INJECTION, POWDER, LYOPHILIZED, FOR SOLUTION INTRAVENOUS at 21:34

## 2020-05-14 RX ADMIN — ASPIRIN 81 MG: 81 TABLET, COATED ORAL at 09:29

## 2020-05-14 RX ADMIN — PIPERACILLIN AND TAZOBACTAM 3.38 G: 3; .375 INJECTION, POWDER, LYOPHILIZED, FOR SOLUTION INTRAVENOUS at 02:35

## 2020-05-14 RX ADMIN — OXYCODONE HYDROCHLORIDE AND ACETAMINOPHEN 1 TABLET: 10; 325 TABLET ORAL at 05:10

## 2020-05-14 RX ADMIN — IBUPROFEN 600 MG: 400 TABLET ORAL at 17:12

## 2020-05-14 RX ADMIN — PANTOPRAZOLE SODIUM 20 MG: 20 TABLET, DELAYED RELEASE ORAL at 07:00

## 2020-05-14 RX ADMIN — CYANOCOBALAMIN TAB 500 MCG 500 MCG: 500 TAB at 09:29

## 2020-05-14 RX ADMIN — PROPRANOLOL HYDROCHLORIDE 10 MG: 10 TABLET ORAL at 21:33

## 2020-05-14 RX ADMIN — CEFAZOLIN SODIUM 2 G: 300 INJECTION, POWDER, LYOPHILIZED, FOR SOLUTION INTRAVENOUS at 13:59

## 2020-05-14 RX ADMIN — MORPHINE SULFATE 4 MG: 2 INJECTION, SOLUTION INTRAMUSCULAR; INTRAVENOUS at 17:12

## 2020-05-14 RX ADMIN — TRAZODONE HYDROCHLORIDE 100 MG: 100 TABLET ORAL at 21:34

## 2020-05-14 RX ADMIN — METOPROLOL TARTRATE 25 MG: 25 TABLET, FILM COATED ORAL at 17:12

## 2020-05-14 RX ADMIN — METOCLOPRAMIDE 10 MG: 10 TABLET ORAL at 07:00

## 2020-05-14 RX ADMIN — LAMOTRIGINE 200 MG: 100 TABLET ORAL at 21:33

## 2020-05-14 NOTE — PROGRESS NOTES
Carolin White RN conducting hourly round at  and found the patient's room to have a strong odor of cigarette smoke. Patient is a smoker chronically and had a pack of cigarettes and a lighter on the bottom shelf of his bedside table. Carolin White RN educated patient on risk of smoking in the hospital and confiscated the cigarettes and lighter.

## 2020-05-14 NOTE — PROGRESS NOTES
Bedside shift change report given to Mariajose (oncoming nurse) by Vicenta Danielson (offgoing nurse). Report included the following information SBAR, Kardex, Intake/Output, MAR and Recent Results.

## 2020-05-14 NOTE — PROGRESS NOTES
Pharmacist Note - Vancomycin Dosing  Therapy day 3  Indication: cellulitis of right hand s/p /p right carpal tunnel release 5/4/20   Current regimen: 1250 mg Q12H    A Trough Level resulted at 6.5 mcg/mL which was obtained 12.8 hrs post-dose. The extrapolated \"true\" trough is approximately 7 mcg/mL based on the patient's known kinetics. Goal trough: 10 - 15 mcg/mL     Plan: Change to vancomycin 1000 mg Q8H . Pharmacy will continue to monitor this patient daily for changes in clinical status and renal function.

## 2020-05-14 NOTE — PROGRESS NOTES
POD#2 extensive flexor tenosynovectomy  Pain much improved from preop. Numbness improved    Afebrile  VSS  Dressing changed  Wound intact. No purulent drainage. Active Digital motion improved  Swelling improved  Minimal erythema    Labs stable  Cultures staph aureus, sens pending    Plan:   Will d/c zosyn  Cont vanc pending sens  Ice and elevation

## 2020-05-15 LAB
GLUCOSE BLD STRIP.AUTO-MCNC: 101 MG/DL (ref 65–100)
GLUCOSE BLD STRIP.AUTO-MCNC: 114 MG/DL (ref 65–100)
SERVICE CMNT-IMP: ABNORMAL
SERVICE CMNT-IMP: ABNORMAL

## 2020-05-15 PROCEDURE — 65270000029 HC RM PRIVATE

## 2020-05-15 PROCEDURE — 36573 INSJ PICC RS&I 5 YR+: CPT | Performed by: ORTHOPAEDIC SURGERY

## 2020-05-15 PROCEDURE — 74011250637 HC RX REV CODE- 250/637: Performed by: ORTHOPAEDIC SURGERY

## 2020-05-15 PROCEDURE — 74011250636 HC RX REV CODE- 250/636: Performed by: ORTHOPAEDIC SURGERY

## 2020-05-15 PROCEDURE — 82962 GLUCOSE BLOOD TEST: CPT

## 2020-05-15 PROCEDURE — 76937 US GUIDE VASCULAR ACCESS: CPT

## 2020-05-15 PROCEDURE — 02HV33Z INSERTION OF INFUSION DEVICE INTO SUPERIOR VENA CAVA, PERCUTANEOUS APPROACH: ICD-10-PCS | Performed by: ORTHOPAEDIC SURGERY

## 2020-05-15 PROCEDURE — C1751 CATH, INF, PER/CENT/MIDLINE: HCPCS

## 2020-05-15 PROCEDURE — 74011000250 HC RX REV CODE- 250: Performed by: ORTHOPAEDIC SURGERY

## 2020-05-15 PROCEDURE — 77030020365 HC SOL INJ SOD CL 0.9% 50ML

## 2020-05-15 RX ORDER — SODIUM CHLORIDE 0.9 % (FLUSH) 0.9 %
5-40 SYRINGE (ML) INJECTION AS NEEDED
Status: DISCONTINUED | OUTPATIENT
Start: 2020-05-15 | End: 2020-05-16 | Stop reason: HOSPADM

## 2020-05-15 RX ORDER — SODIUM CHLORIDE 0.9 % (FLUSH) 0.9 %
5-40 SYRINGE (ML) INJECTION EVERY 8 HOURS
Status: DISCONTINUED | OUTPATIENT
Start: 2020-05-15 | End: 2020-05-16 | Stop reason: HOSPADM

## 2020-05-15 RX ADMIN — CEFAZOLIN SODIUM 2 G: 300 INJECTION, POWDER, LYOPHILIZED, FOR SOLUTION INTRAVENOUS at 21:47

## 2020-05-15 RX ADMIN — PROPRANOLOL HYDROCHLORIDE 10 MG: 10 TABLET ORAL at 21:47

## 2020-05-15 RX ADMIN — LAMOTRIGINE 200 MG: 100 TABLET ORAL at 09:37

## 2020-05-15 RX ADMIN — IBUPROFEN 600 MG: 400 TABLET ORAL at 16:13

## 2020-05-15 RX ADMIN — OXYCODONE HYDROCHLORIDE AND ACETAMINOPHEN 1 TABLET: 10; 325 TABLET ORAL at 16:13

## 2020-05-15 RX ADMIN — METOCLOPRAMIDE 10 MG: 10 TABLET ORAL at 07:06

## 2020-05-15 RX ADMIN — TRAZODONE HYDROCHLORIDE 100 MG: 100 TABLET ORAL at 21:47

## 2020-05-15 RX ADMIN — OXYCODONE HYDROCHLORIDE AND ACETAMINOPHEN 1 TABLET: 10; 325 TABLET ORAL at 21:58

## 2020-05-15 RX ADMIN — OYSTER SHELL CALCIUM WITH VITAMIN D 1 TABLET: 500; 200 TABLET, FILM COATED ORAL at 09:38

## 2020-05-15 RX ADMIN — METOPROLOL TARTRATE 25 MG: 25 TABLET, FILM COATED ORAL at 17:48

## 2020-05-15 RX ADMIN — IBUPROFEN 600 MG: 400 TABLET ORAL at 21:47

## 2020-05-15 RX ADMIN — CEFAZOLIN SODIUM 2 G: 300 INJECTION, POWDER, LYOPHILIZED, FOR SOLUTION INTRAVENOUS at 07:06

## 2020-05-15 RX ADMIN — CYANOCOBALAMIN TAB 500 MCG 500 MCG: 500 TAB at 09:38

## 2020-05-15 RX ADMIN — METOCLOPRAMIDE 10 MG: 10 TABLET ORAL at 16:13

## 2020-05-15 RX ADMIN — IBUPROFEN 600 MG: 400 TABLET ORAL at 07:06

## 2020-05-15 RX ADMIN — MORPHINE SULFATE 4 MG: 2 INJECTION, SOLUTION INTRAMUSCULAR; INTRAVENOUS at 12:44

## 2020-05-15 RX ADMIN — ASPIRIN 81 MG: 81 TABLET, COATED ORAL at 09:38

## 2020-05-15 RX ADMIN — CEFAZOLIN SODIUM 2 G: 300 INJECTION, POWDER, LYOPHILIZED, FOR SOLUTION INTRAVENOUS at 14:47

## 2020-05-15 RX ADMIN — Medication 10 ML: at 14:48

## 2020-05-15 RX ADMIN — PANTOPRAZOLE SODIUM 20 MG: 20 TABLET, DELAYED RELEASE ORAL at 07:06

## 2020-05-15 RX ADMIN — Medication 10 ML: at 21:53

## 2020-05-15 RX ADMIN — ONDANSETRON 4 MG: 2 INJECTION INTRAMUSCULAR; INTRAVENOUS at 12:55

## 2020-05-15 RX ADMIN — LAMOTRIGINE 200 MG: 100 TABLET ORAL at 21:47

## 2020-05-15 RX ADMIN — PANTOPRAZOLE SODIUM 20 MG: 20 TABLET, DELAYED RELEASE ORAL at 17:48

## 2020-05-15 NOTE — DISCHARGE INSTRUCTIONS
Dr. Tonya oMrgan Upper Extremity Postoperative Instructions      1. Please keep your surgical wound clean and dry. Please keep the dressing clean and dry. Your dressing should be changed daily with a dry dressing. If you have any questions or problems, please call our office at [phone]. 2. Please elevate the operative extremity to the level of the heart to keep swelling at a minimum. You may get up to move around, but when seated please keep the extremity elevated as much as possible. This will decrease swelling and pain. 3. You were told to be non-weight bearing following surgery. Please do not lift or  anything heavier than 1 lb with your operative hand. 4. You may ice your Arm/Hand 5 times a day for 20 minutes at a time. 5. Take antibiotics as prescribed. 6. Take pain medication only as needed. You may also take advil for pain. 7. If you experience any redness, increased pain, increased swelling not relieved by elevation, drainage, fever, or chills, please call the office at [phone], and speak with Josh Ibrahim or Mariusz Alexandre. Please Follow-up at my office [address] [facility] [address] in 1 weeks. Call [phone] for an appointment.         Check weekly labs for CBC wt diff, CMP, ESR, CRP while on IV antibiotics and fax to Dr Linda Driscoll  Fax    F/U on 5/29/20 at 9 am   Office address [address] [facility] [facility] [address] [address]  Phone [phone]   Fax [phone]

## 2020-05-15 NOTE — CONSULTS
Infectious Disease Consult Note    Reason for Consult: R wrist tenosynovitis   Date of Consultation: May 15, 2020  Date of Admission: 5/12/2020  Referring Physician: Dr Jennifer Mesa       HPI:      Mr Ruben Maddox is a 61 yr old gentleman wt hx of CAD, DM, S/P gastric bypass 2002 for morbid obesity who had a carpel tunnel release early May 2020. He had hit his hand, wrist on a air/heat conditioning machine. Noted some pustular drainage, redness and intense pain. Was taking Bactrim prior to admission. Symptoms and pain worsened and presented to hospital. Had I and D done 5/12/20 wt operative report indicating purulent material within carpal tunnel. Cultures at [facility], AN AFFILIATE OF [facility]. He is on Cefazolin IV. Today, reports nausea, has been vomiting and had had sweats/chills  Blood cx negative this admission     ID consult requested today for antibiotic recommendations.      Past Medical History:  Past Medical History:   Diagnosis Date    Anxiety     Arrhythmia     SINUS TACHY    Langford's esophagus 3/19/2012    CAD (coronary artery disease)     Chronic pain     Depression     Diabetes mellitus ([address] 3/19/2012    GERD (gastroesophageal reflux disease)     Headache     Hearing loss     History of MI (myocardial infarction) 3-00    History of peptic ulcer disease 3/19/2012    HTN (hypertension) 3/19/2012    Memory loss     Morbid obesity ([address] 3/19/2012    2002-OPEN GASTRIC BYPASS    Other ill-defined conditions(799.89)     MIGRAINES    Psychiatric disorder     Reflux 3/19/2012    Sleep apnea, obstructive 3/19/2012    Unspecified adverse effect of anesthesia     HEADACHE    Unspecified sleep apnea     HX-PRIOR TO GASTRIC BYPASS         Surgical History:  Past Surgical History:   Procedure Laterality Date    APPENDECTOMY      BIOPSY LIVER  9-3-02    HX APPENDECTOMY      HX CHOLECYSTECTOMY      HX GASTRIC BYPASS  9-3-02    HX HERNIA REPAIR  4.3.2012    Dr. Aliza Ascencio - laparoscopic incisional hernia repair    HX TONSILLECTOMY  LAP,VAGUS NERVE,TRUNCAL      NEUROLOGICAL PROCEDURE UNLISTED  2004    CERVICAL FUSION         Family History:   Family History   Problem Relation Age of Onset    Diabetes Mother     Hypertension Mother     Obesity Mother     Emphysema Mother     Heart Disease Mother     Cancer Father         prostate    Obesity Sister     Other Sister         aneurysm    Alcohol abuse Brother     Headache Brother     Other Brother         aneurysm         Social History:     · Living Situation: at home wt wife, retired   · Tobacco:admits to smoking   · Alcohol:occassional   · Illicit Drugs:denied   · Sexual History: past  · Travel History denied   · Exposures:   · Outdoor/Hiking: denied  · Animal/Pet: Dogs at home   · Construction: denied  · Hot Tub: denied   · Brackish/stagnant exposure: denied  · Sick Contacts: denied     Allergies:  No Known Allergies      Review of Systems:     Gen: + sweats/chills    HEENT: Negative for headache, vision changes, ear ache or discharge, tingling,  nasal discharge, swelling, lumps in neck, sores on tongue   CV:  Negative for chest pain, dyspnea on exertion, leg edema   Lungs: Negative for shortness of breath, cough, wheezing   Abdomen: Negative for abdominal pain,  + nausea, + vomiting,  Denied diarrhea,   Genitourinary: Negative for genital pain or genital discharge     Neuro: Negative for headache, numbness, tingling, extremity weakness,  syncope, seizures    Skin: Negative for rash, sores/open wounds   Musculoskeletal: + R wrist pain    Endocrine: Negative for high or low blood sugars, heat or cold intolerance    Psych: Negative for manic behavior     Medications:  No current facility-administered medications on file prior to encounter. Current Outpatient Medications on File Prior to Encounter   Medication Sig Dispense Refill    metoprolol succinate (TOPROL-XL) 25 mg XL tablet Take 25 mg by mouth two (2) times a day.       diclofenac (VOLTAREN) 1 % gel Apply 2 g to affected area four (4) times daily as needed for Pain (apply to hand).  traZODone (DESYREL) 100 mg tablet Take 100 mg by mouth nightly.  galcanezumab-gnlm (Emgality Pen) 120 mg/mL injection 1 mL by SubCUTAneous route every thirty (30) days. 2 mL 0    SUMAtriptan succinate 6 mg/0.5 mL kit INJECT 0.5 ML ONCE AS NEEDED FOR MIGRAINE UP TO 1 DOSE 9 Kit 6    galcanezumab-gnlm (EMGALITY PEN) 120 mg/mL injection 1 mL by SubCUTAneous route every thirty (30) days. Monthly Rx to be filled starting 12/2019--Fill 240mg loading dose today 1 mL 5    oxyCODONE-acetaminophen (PERCOCET 10)  mg per tablet Take 1 Tab by mouth every eight (8) hours as needed for Pain.  dihydroergotamine (MIGRANAL) 0.5 mg/pump act. (4 mg/mL) nasal spray One spray in each nostril and repeat in 15 minutes X 1 . No more then 4 sprays in 24 hours 8 Bottle 3    multivitamin (ONE A DAY) tablet Take 1 Tab by mouth daily.  cyanocobalamin (VITAMIN B-12) 500 mcg tablet Take 500 mcg by mouth daily.  VITAMIN A/VITAMIN D2/COD LIVER (VITAMINS A & D PO) Take  by mouth.  CALCIUM CITRATE/VITAMIN D3 (CALCIUM CITRATE + D PO) Take 1 Tab by mouth daily.  PROPRANOLOL HCL (INDERAL PO) Take  by mouth. 10 MG NIGHTLY      aspirin delayed-release 81 mg tablet Take 81 mg by mouth daily.  lansoprazole (PREVACID) 30 mg capsule Take  by mouth two (2) times a day.  lamoTRIgine (LAMICTAL) 100 mg tablet Take 200 mg by mouth two (2) times a day.  metoclopramide (REGLAN) 10 mg tablet Take 10 mg by mouth two (2) times a day. Current Facility-Administered Medications:     sodium chloride (NS) flush 5-40 mL, 5-40 mL, IntraVENous, Q8H, Landon Subramanian MD    sodium chloride (NS) flush 5-40 mL, 5-40 mL, IntraVENous, PRN, Yaneli Diallo MD    . PHARMACY TO SUBSTITUTE PER PROTOCOL (Reordered from: dihydroergotamine (MIGRANAL) 0.5 mg/pump act.  (4 mg/mL) nasal spray), , , Per Protocol, Yaneli Diallo MD  [place] ceFAZolin (ANCEF) 2 g/20 mL in sterile water IV syringe, 2 g, IntraVENous, Q8H, Landon Subramanian MD, 2 g at 05/15/20 0706    SUMAtriptan (IMITREX) injection 6 mg (Patient Supplied), 6 mg, SubCUTAneous, PRN, Sakina Moreno MD, 6 mg at 05/14/20 1216    nicotine (NICODERM CQ) 21 mg/24 hr patch 1 Patch, 1 Patch, TransDERmal, QPM, Otto Hill MD    ibuprofen (MOTRIN) tablet 600 mg, 600 mg, Oral, Q8H, Landon Subramanian MD, 600 mg at 05/15/20 0706    aspirin delayed-release tablet 81 mg, 81 mg, Oral, DAILY, Landon Subramanian MD, 81 mg at 05/15/20 0938    calcium-vitamin D (OS-SUDHIR) 500 mg-200 unit tablet, 1 Tab, Oral, DAILY, Sakina Moreno MD, 1 Tab at 05/15/20 6191    cyanocobalamin (VITAMIN B12) tablet 500 mcg, 500 mcg, Oral, DAILY, Sakina Moreno MD, 500 mcg at 05/15/20 0474    lamoTRIgine (LaMICtal) tablet 200 mg, 200 mg, Oral, Q12H, Landon Subramanian MD, 200 mg at 05/15/20 0937    pantoprazole (PROTONIX) tablet 20 mg, 20 mg, Oral, ACB&D, Sakina Moreno MD, 20 mg at 05/15/20 0706    metoprolol tartrate (LOPRESSOR) tablet 25 mg, 25 mg, Oral, BID, Sakina Moreno MD, Stopped at 05/15/20 0954    oxyCODONE-acetaminophen (PERCOCET 10)  mg per tablet 1 Tab, 1 Tab, Oral, Q4H PRN, Sakina Moreno MD, 1 Tab at 05/14/20 1219    propranoloL (INDERAL) tablet 10 mg, 10 mg, Oral, QHS, Landon Subramanian MD, 10 mg at 05/14/20 2133    ondansetron (ZOFRAN) injection 4 mg, 4 mg, IntraVENous, Q6H PRN, Sakina Moreno MD, 4 mg at 05/15/20 1255    insulin lispro (HUMALOG) injection, , SubCUTAneous, TIDAC, Sakina Moreno MD, Stopped at 05/12/20 1130    glucose chewable tablet 16 g, 4 Tab, Oral, PRN, HepLandon bland MD    glucagon (GLUCAGEN) injection 1 mg, 1 mg, IntraMUSCular, PRN, Sakina Moreno MD    dextrose 10% infusion 0-250 mL, 0-250 mL, IntraVENous, PRN, Hepedwina, Angelique Espino MD    metoclopramide HCl (REGLAN) tablet 10 mg, 10 mg, Oral, ACB&D, Hepper, Boris Laird MD, 10 mg at 05/15/20 8346    traZODone (DESYREL) tablet 100 mg, 100 mg, Oral, QHS, Mumtaz Coombs MD, 100 mg at 05/14/20 2134      Physical Exam:    Vitals:   Patient Vitals for the past 24 hrs:   Temp Pulse Resp BP SpO2   05/15/20 1042 97.7 °F (36.5 °C) 73   99 %   05/15/20 0935 98.4 °F (36.9 °C) 70 16 108/54 94 %   05/15/20 0250 98.7 °F (37.1 °C) 64 16 151/67 98 %   05/14/20 2028 98.1 °F (36.7 °C) 64 16 115/57 97 %   05/14/20 1711  76  116/65    ·   · GEN: NAD  · HEENT: Normocephalic, atraumatic, + R conjunctival erythema, no scleral icterus,  no cervical lymphadenopathy, no sinus tenderness, no thrush  · CV: S1, S2 heard regularly,   · Lungs: Clear to auscultation bilaterally  · Abdomen: soft, non distended, non tender, no organomegaly appreciated, no CVA tenderness   · Extremities: no edema, R wrist dressing, able to flex fingers   · Neuro: Alert, oriented to time, place and situation, moves all extremities to commands, verbal   · Skin: no rash  · Psych: good affect, good eye contact, non tearful   · MSK R hand, wrist dressing, no erythema extending from bandage       Labs:   Recent Results (from the past 24 hour(s))   GLUCOSE, POC    Collection Time: 05/14/20  4:50 PM   Result Value Ref Range    Glucose (POC) 127 (H) 65 - 100 mg/dL    Performed by Opal Duggan  PCT    GLUCOSE, POC    Collection Time: 05/14/20  8:59 PM   Result Value Ref Range    Glucose (POC) 133 (H) 65 - 100 mg/dL    Performed by Opal Duggan  PCT    GLUCOSE, POC    Collection Time: 05/15/20  6:22 AM   Result Value Ref Range    Glucose (POC) 101 (H) 65 - 100 mg/dL    Performed by Amanda Edwards    GLUCOSE, POC    Collection Time: 05/15/20 11:06 AM   Result Value Ref Range    Glucose (POC) 114 (H) 65 - 100 mg/dL    Performed by Mike Clemente        Microbiology Data:       Blood: 5/12/20  Component Value Ref Range & Units Status   Special Requests: NO SPECIAL REQUESTS    Preliminary   Culture result: NO GROWTH 3 DAYS   Preliminary   Result History       20  Wound Drainage         Component Value Ref Range & Units Status   Special Requests: NO SPECIAL REQUESTS    Final   GRAM STAIN NO WBC'S SEEN    Final   GRAM STAIN NO ORGANISMS SEEN    Final   Culture result: Abnormal     Final   MODERATE STAPHYLOCOCCUS AUREUS    Susceptibility      Staphylococcus aureus     PAWEL    Beta Lactamase Positive ug/mL S    Ciprofloxacin ($) <=0.5 ug/mL S    Clindamycin ($) 0.25 ug/mL S    Daptomycin ($$$$$) 0.25 ug/mL S    Doxycycline ($$) <=0.5 ug/mL S    Erythromycin ($$$$) >=8 ug/mL R    Gentamicin ($) <=0.5 ug/mL S    Levofloxacin ($) 0.25 ug/mL S    Linezolid ($$$$$) 2 ug/mL S    Moxifloxacin ($$$$) <=0.25 ug/mL S    Oxacillin 0.5 ug/mL S    Rifampin ($$$$) <=0.5 ug/mL S1    Tetracycline <=1 ug/mL S    Trimeth/Sulfa <=10 ug/mL S    Vancomycin ($) 1 ug/mL S        Abscess         Component Value Ref Range & Units Status   Special Requests: RIGHT HAND ABSCESS   Final   GRAM STAIN OCCASIONAL WBCS SEEN    Final   GRAM STAIN NO ORGANISMS SEEN    Final   Culture result: Abnormal     Final   LIGHT STAPHYLOCOCCUS AUREUS    Susceptibility      Staphylococcus aureus     PAWEL    Beta Lactamase Positive ug/mL S    Ciprofloxacin ($) <=0.5 ug/mL S    Clindamycin ($) 0.25 ug/mL S    Daptomycin ($$$$$) 0.25 ug/mL S    Doxycycline ($$) <=0.5 ug/mL S    Erythromycin ($$$$) <=0.25 ug/mL S    Gentamicin ($) <=0.5 ug/mL S    Levofloxacin ($) 0.25 ug/mL S    Linezolid ($$$$$) 2 ug/mL S    Moxifloxacin ($$$$) <=0.25 ug/mL S    Oxacillin 0.5 ug/mL S    Rifampin ($$$$) <=0.5 ug/mL S1    Tetracycline <=1 ug/mL S    Trimeth/Sulfa <=10 ug/mL S    Vancomycin ($) 1 ug/mL S                Imagin20 Wrist X ray   FINDINGS: Two views of the right wrist demonstrate no fracture or other acute  osseous or articular abnormality.  The soft tissues are within normal limits.     IMPRESSION  IMPRESSION: No acute abnormality      Procedures:   Irrigation and debridement of right wrist flexor tenosynovitis with extensive tenosynovectomy    Assessment / Plan:     Problem List as of 5/15/2020 Date Reviewed: 11/13/2019          Codes Class Noted - Resolved    Cellulitis of right wrist ICD-10-CM: L03.113  ICD-9-CM: 682.4  5/12/2020 - Present        Migraine without status migrainosus, not intractable (Chronic) ICD-10-CM: G43.909  ICD-9-CM: 346.90  3/29/2016 - Present        Cellulitis ICD-10-CM: L03.90  ICD-9-CM: 682.9  4/19/2012 - Present        S/P laparoscopic hernia repair ICD-10-CM: Z98.890, Z87.19  ICD-9-CM: V45.89  4/4/2012 - Present        Other and unspecified postsurgical nonabsorption ICD-10-CM: K91.2  ICD-9-CM: 579.3  3/29/2012 - Present        Diabetes mellitus ([facility] ICD-10-CM: E11.9  ICD-9-CM: 250.00  3/19/2012 - Present        HTN (hypertension) ICD-10-CM: I10  ICD-9-CM: 401.9  3/19/2012 - Present        Sleep apnea, obstructive ICD-10-CM: G47.33  ICD-9-CM: 327.23  3/19/2012 - Present        Langford's esophagus ICD-10-CM: K22.70  ICD-9-CM: 530.85  3/19/2012 - Present        Reflux ICD-10-CM: [id number]  ICD-9-CM: Ailyn Cummings  3/19/2012 - Present        History of peptic ulcer disease ICD-10-CM: Z87.11  ICD-9-CM: V12.71  3/19/2012 - Present        History of MI (myocardial infarction) ICD-10-CM: I25.2  ICD-9-CM: 12  Unknown - Present        RESOLVED: Incisional hernia ICD-10-CM: K43.2  ICD-9-CM: 553.21  3/29/2012 - 4/19/2012        RESOLVED: Morbid obesity ([facility] ICD-10-CM: E66.01  ICD-9-CM: 278.01  3/19/2012 - 3/29/2012                 Mr Dean James is a 61 yr old gentleman wt hx of CAD, DM, S/P gastric bypass 2002 for morbid obesity who had a carpel tunnel release early May 2020 with MSSA tenosynovitis ( R wrist)    Had I and D done 5/12/20 wt operative report indicating purulent material within carpal tunnel. Cultures at [facility], AN AFFILIATE OF [facility]. He is on Cefazolin IV.       1) MSSA tenosynovitis: R wrist   Given concern for PO absorption of medications recommend IV Cefazolin 2gm Q 8 hours for 2 weeks  ( renally dose by pharmacist )   Will re assess in the office in 2 weeks to see if can covert to PO antibiotics   Wound care per ortho  PICC line per primary team and removal once IV antibiotics completed   Patient /family member as he says his wife will help him with antibiotics will need home  Health and teaching for antibiotics   Check weekly labs for CBC wt diff, CMP, ESR, CRP while on IV antibiotics and fax to Dr Christian Coburn  Fax    F/U on 5/29/20 at 9 am   Office address [address] [place] [address] , [address] [address]  Phone [phone]   Fax [phone]       Antibiotic side effects/adverse effects/toxicities discussed including gastrointestinal, renal, hematological , ability to lower siezure threshold, risk for C diff infection. Probiotics, daily yogurt encouraged. 2) DM    3) Gastric bypass hx for morbid obesity    4) Smoking cessation emphasized     5) Hx of cervical fusion     6) Rib and heel fracture hx per patient     Thank for the opportunity to participate in the care of this patient. Please contact with questions or concerns.      Modesto Lane, DO  2:38 PM

## 2020-05-15 NOTE — PROGRESS NOTES
POD#3 flexor tenosynovectomy for infection  No complaints    Afebrile  VSS  Dressing changed  Hand continues to improve  Good active digital motion    Cultures MSSA    Plan:  Ancef  ID consult  Discharge tomorrow?

## 2020-05-15 NOTE — PROGRESS NOTES
Bedside shift change report given to Mariajose (oncoming nurse) by Marcela Frost (offgoing nurse). Report included the following information SBAR, Kardex, Intake/Output, MAR and Recent Results.

## 2020-05-15 NOTE — PROGRESS NOTES
ADRIAN: RUR 8%. Anticipate discharge home with wife and home IV abx. Final IV orders have been written. PICC placement pending. Referral pending with [facility] to check insurance coverage for home IV abx. [facility] [employer] [facility] accepted patient for home health. Chart reviewed. ARRON spoke with Dr. Tammy Dorman. The patient will need IV abx for 2 weeks. PICC has been ordered. CM met with patient  to discusses home IV abx. Offered freedom of choice for infusion and HH. [facility]. FOC placed on hard chart. CM sent referrals via [employer]. CM sent final IV abx orders to [facility] via [employer]. ARRON spoke with Guanakito Stock with [facility]. She will reach out to the patient to discuss cost and IV abx teaching.     Amador Saucedo, BSW/CRM

## 2020-05-16 VITALS
WEIGHT: 168 LBS | TEMPERATURE: 97.4 F | BODY MASS INDEX: 22.75 KG/M2 | DIASTOLIC BLOOD PRESSURE: 72 MMHG | SYSTOLIC BLOOD PRESSURE: 133 MMHG | RESPIRATION RATE: 16 BRPM | HEART RATE: 61 BPM | OXYGEN SATURATION: 97 % | HEIGHT: 72 IN

## 2020-05-16 LAB
GLUCOSE BLD STRIP.AUTO-MCNC: 90 MG/DL (ref 65–100)
GLUCOSE BLD STRIP.AUTO-MCNC: 99 MG/DL (ref 65–100)
SERVICE CMNT-IMP: NORMAL
SERVICE CMNT-IMP: NORMAL

## 2020-05-16 PROCEDURE — 82962 GLUCOSE BLOOD TEST: CPT

## 2020-05-16 PROCEDURE — 74011000250 HC RX REV CODE- 250: Performed by: ORTHOPAEDIC SURGERY

## 2020-05-16 PROCEDURE — 74011250637 HC RX REV CODE- 250/637: Performed by: ORTHOPAEDIC SURGERY

## 2020-05-16 PROCEDURE — 74011250636 HC RX REV CODE- 250/636: Performed by: ORTHOPAEDIC SURGERY

## 2020-05-16 RX ORDER — OXYCODONE AND ACETAMINOPHEN 10; 325 MG/1; MG/1
1 TABLET ORAL
Qty: 10 TAB | Refills: 0 | Status: SHIPPED | OUTPATIENT
Start: 2020-05-16 | End: 2020-05-19

## 2020-05-16 RX ADMIN — ASPIRIN 81 MG: 81 TABLET, COATED ORAL at 08:30

## 2020-05-16 RX ADMIN — CYANOCOBALAMIN TAB 500 MCG 500 MCG: 500 TAB at 08:30

## 2020-05-16 RX ADMIN — IBUPROFEN 600 MG: 400 TABLET ORAL at 13:35

## 2020-05-16 RX ADMIN — METOCLOPRAMIDE 10 MG: 10 TABLET ORAL at 05:42

## 2020-05-16 RX ADMIN — PANTOPRAZOLE SODIUM 20 MG: 20 TABLET, DELAYED RELEASE ORAL at 05:57

## 2020-05-16 RX ADMIN — OXYCODONE HYDROCHLORIDE AND ACETAMINOPHEN 1 TABLET: 10; 325 TABLET ORAL at 11:44

## 2020-05-16 RX ADMIN — OYSTER SHELL CALCIUM WITH VITAMIN D 1 TABLET: 500; 200 TABLET, FILM COATED ORAL at 09:00

## 2020-05-16 RX ADMIN — OXYCODONE HYDROCHLORIDE AND ACETAMINOPHEN 1 TABLET: 10; 325 TABLET ORAL at 03:27

## 2020-05-16 RX ADMIN — Medication 10 ML: at 05:40

## 2020-05-16 RX ADMIN — OXYCODONE HYDROCHLORIDE AND ACETAMINOPHEN 1 TABLET: 10; 325 TABLET ORAL at 08:08

## 2020-05-16 RX ADMIN — METOPROLOL TARTRATE 25 MG: 25 TABLET, FILM COATED ORAL at 08:30

## 2020-05-16 RX ADMIN — IBUPROFEN 600 MG: 400 TABLET ORAL at 05:40

## 2020-05-16 RX ADMIN — CEFAZOLIN SODIUM 2 G: 300 INJECTION, POWDER, LYOPHILIZED, FOR SOLUTION INTRAVENOUS at 05:40

## 2020-05-16 RX ADMIN — LAMOTRIGINE 200 MG: 100 TABLET ORAL at 08:30

## 2020-05-16 RX ADMIN — CEFAZOLIN SODIUM 2 G: 300 INJECTION, POWDER, LYOPHILIZED, FOR SOLUTION INTRAVENOUS at 13:36

## 2020-05-16 NOTE — PROGRESS NOTES
I have reviewed discharge instructions with the patient. The patient verbalized understanding. Patient was taken in a wheelchair to Patient Discharge along with discharge instructions, prescription script, dressing supplies, and patient's belongings.

## 2020-05-16 NOTE — DISCHARGE SUMMARY
ORTHO Discharge Summary      Patient ID:  Cristine Silver  [id number]  61 y.o.  1960    Allergies: Patient has no known allergies. Admit date: 5/12/2020    Discharge date and time: 5/16/2020    Admitting Physician: Tristan Sanchez MD     Discharge Physician: Damaso Whittington    * Admission Diagnoses: Cellulitis of right wrist [L03.113]    * Discharge Diagnoses:   Hospital Problems as of 5/16/2020 Date Reviewed: 11/13/2019          Codes Class Noted - Resolved POA    Cellulitis of right wrist ICD-10-CM: L03.113  ICD-9-CM: 682.4  5/12/2020 - Present Unknown            Right wrist tenosynovitis    Surgeon: Damaso Whittington    Preoperative Medical Clearance: None    * Procedure: Procedure(s):  INCISION AND DRAINAGE RIGHT HAND/ / REQ. [id number]           Perioperative Antibiotics:Vanc -> ancef    Postoperative Pain Management:  Oxycodone, tylenol, advil, morphine    DVT Prophylaxis:  SCD    Post Op complications: none    * Discharge Condition: improved    * Discharged to: Home with     * Follow-up Care/Discharge instructions:      Dr. Dudley Howe Upper Extremity Postoperative Instructions      1. Please keep your surgical wound clean and dry. Please keep the dressing clean and dry. Your dressing should be changed daily with a dry dressing. If you have any questions or problems, please call our office at [phone].     2. Please elevate the operative extremity to the level of the heart to keep swelling at a minimum. You may get up to move around, but when seated please keep the extremity elevated as much as possible. This will decrease swelling and pain.     3. You were told to be non-weight bearing following surgery. Please do not lift or  anything heavier than 1 lb with your operative hand.      4. You may ice your Arm/Hand 5 times a day for 20 minutes at a time.     5. Take antibiotics as prescribed.     6. Take pain medication only as needed. You may also take advil for pain.     7.  If you experience any redness, increased pain, increased swelling not relieved by elevation, drainage, fever, or chills, please call the office at [phone], and speak with Jeff Bass or Robby Loud.     Please Follow-up at my office [address] [facility], [address] in 1 weeks.   Call [phone] for an appointment.           Check weekly labs for CBC wt diff, CMP, ESR, CRP while on IV antibiotics and fax to Dr Regino Erickson [phone]   F/U on 5/29/20 at [address] [place] [address] address [address] [place] [address] Jackson Ballesteros  , [address] [address]  Phone [phone]   Fax [phone]       Signed:  Nitesh Almaraz MD  5/16/2020  9:12 AM

## 2020-05-16 NOTE — ROUTINE PROCESS
Nurse Josselin Krueger gave bedside report to oncoming nurse Tano Quezada RN by [employer] and [facility]

## 2020-05-16 NOTE — PROGRESS NOTES
POD#4 flexor tenosynovectomy for infection  No complaints.   Got PICC yesterday    Afebrile  VSS  Dressing changed  Hand continues to improve  Good active digital motion    Cultures MSSA    Plan:  Appreciate ID consult  Home today

## 2020-05-16 NOTE — PROGRESS NOTES
Lenny received a page patient is ready for discharge today. Lenny contacted the pharmacist [address] [facility] with [facility] ([phone]) and verified HCP is ready for discharge. She would like the patient to receive his 2pm dose at the hospital. Lenny informed staff nurse. Lenny contacted [facility] ([phone]) and spoke to Ximena Woods and she informed cm they will be out to see the patient tomorrow. No other needs identified at this time.

## 2020-05-17 LAB
BACTERIA SPEC CULT: NORMAL
SERVICE CMNT-IMP: NORMAL

## 2020-05-18 ENCOUNTER — PATIENT OUTREACH (OUTPATIENT)
Dept: CARDIOLOGY CLINIC | Age: 60
End: 2020-05-18

## 2020-05-18 NOTE — PROGRESS NOTES
CM received call from [facility] with [facility] [phone] requesting PICC report. There is no note with the PICC report. [facility] will contact the PICC team directly. 2:00 PM:ARRON noted PICC report is now available. ARRON faxed to [facility] at [phone].     MARII Ponce/CRM

## 2020-05-18 NOTE — PROCEDURES
PICC Placement Note    PRE-PROCEDURE VERIFICATION  Correct Procedure: yes  Correct Site:  yes  Temperature: Temp: 97.4 °F (36.3 °C), Temperature Source: Temp Source: Oral  No results for input(s): BUN, CREA, PLT, INR, WBC, PLTEXT, INREXT in the last 72 hours. No lab exists for component: APTHR  Allergies: Patient has no known allergies. Education materials, including PICC Booklet, for PICC Care given to patient: yes. See Patient Education activity for further details. PROCEDURE DETAIL  A single lumen PICC line was started for Home IV Therapy. The following documentation is in addition to the PICC properties in the lines/airways flowsheet :  Lot #: [id number]  Was xylocaine 1% used intradermally:  yes  Catheter Length: 43 (cm)  Vein Selection for PICC:left basilic  Central Line Bundle followed yes  Complication Related to Insertion: none    The placement was verified by ECG/Sapiens technology: The  tip location is on the left side and the tip is in the  superior vena cava. See ECG results for PICC tip placement. Report given to nurse Billy. Line is okay to use.     Lizeth Caicedo RN

## 2020-05-18 NOTE — PROGRESS NOTES
Patient contacted regarding recent discharge and COVID-19 risk   Care Transition Nurse/ Ambulatory Care Manager contacted the family by telephone to perform post discharge assessment. Verified name and  with family as identifiers. Patient has following risk factors of: diabetes. CTN/ACM reviewed discharge instructions, medical action plan and red flags related to discharge diagnosis. Reviewed and educated them on any new and changed medications related to discharge diagnosis. Advised obtaining a 90-day supply of all daily and as-needed medications. Education provided regarding infection prevention, and signs and symptoms of COVID-19 and when to seek medical attention with family who verbalized understanding. Discussed exposure protocols and quarantine from [address] you at higher risk for severe illness  and given an opportunity for questions and concerns. The family agrees to contact the COVID-19 hotline [phone] or PCP office for questions related to their healthcare. CTN/ACM provided contact information for future reference. From CDC: Are you at higher risk for severe illness?  Wash your hands often.  Avoid close contact (6 feet, which is about two arm lengths) with people who are sick.  Put distance between yourself and other people if COVID-19 is spreading in your community.  Clean and disinfect frequently touched surfaces.  Avoid all cruise travel and non-essential air travel.  Call your healthcare professional if you have concerns about COVID-19 and your underlying condition or if you are sick. For more information on steps you can take to protect yourself, see CDC's How to Protect Yourself      Patient/family/caregiver given information for Angel Agarwal and agrees to enroll no    Plan for follow-up call in 7-14 days based on severity of symptoms and risk factors.

## 2020-05-22 NOTE — CDMP QUERY
Patient admitted with infection of wrist and was diagnosed with Rt Wrist Flexor Tenosynovitis and had a debridement. Noted documentation of Cellulitis in the H&P and D/C Summ, however there is also documentation in the 5/12 Progress Note of 'no significant cellulitis'. After study, was the cellulitis: 1. Ruled in 2. Ruled out 3. Other explantion The medical record reflects the following: 
  Risk Factors: hx of DM, with wrist infection/ tenosynovitis Clinical Indicators: pt with wound on wrist, with surrounding redness and edema documented, with Cellulitis documented in the H&P and the D/C Summ, however 'no significance cellulitis' is documented in the 5/12 progress note. Treatment: Vanc/ Ancef, Zosyn, pt also had a Debridement, I&D consult was made. Thank you, Мраия ISAACS Clinical Documentation Improvement.

## 2020-05-29 ENCOUNTER — OFFICE VISIT (OUTPATIENT)
Dept: INTERNAL MEDICINE CLINIC | Age: 60
End: 2020-05-29

## 2020-05-29 ENCOUNTER — TELEPHONE (OUTPATIENT)
Dept: INTERNAL MEDICINE CLINIC | Age: 60
End: 2020-05-29

## 2020-05-29 VITALS
WEIGHT: 161.6 LBS | OXYGEN SATURATION: 99 % | RESPIRATION RATE: 18 BRPM | BODY MASS INDEX: 21.92 KG/M2 | TEMPERATURE: 97.9 F | DIASTOLIC BLOOD PRESSURE: 60 MMHG | HEART RATE: 80 BPM | SYSTOLIC BLOOD PRESSURE: 100 MMHG

## 2020-05-29 DIAGNOSIS — A49.01 STAPH AUREUS INFECTION: ICD-10-CM

## 2020-05-29 DIAGNOSIS — M65.9 TENOSYNOVITIS OF RIGHT WRIST: Primary | ICD-10-CM

## 2020-05-29 DIAGNOSIS — Z98.1 HISTORY OF FUSION OF CERVICAL SPINE: ICD-10-CM

## 2020-05-29 DIAGNOSIS — Z98.84 H/O GASTRIC BYPASS: ICD-10-CM

## 2020-05-29 RX ORDER — CEPHALEXIN 500 MG/1
500 CAPSULE ORAL 3 TIMES DAILY
Qty: 42 CAP | Refills: 0 | Status: SHIPPED | OUTPATIENT
Start: 2020-05-29 | End: 2022-09-01

## 2020-05-29 RX ORDER — LANOLIN ALCOHOL/MO/W.PET/CERES
CREAM (GRAM) TOPICAL
COMMUNITY

## 2020-05-29 RX ORDER — ONDANSETRON 4 MG/1
TABLET, ORALLY DISINTEGRATING ORAL
COMMUNITY
Start: 2020-03-25 | End: 2021-04-27 | Stop reason: SDUPTHER

## 2020-05-29 NOTE — PROGRESS NOTES
Infectious Disease Clinic Note      HPI:       Constantineger Bryant in for follow-up with right wrist tenosynovitis  He has been getting cefazolin via PICC line with no issues so far  Denied nausea vomiting diarrhea  Right wrist range of motion much improved  Still has some pain but improved  As he is able to flex all of his fingers except for the digit  Recently followed up with Ortho per patient  Denies any fevers chills appetite changes  Denies any pain or swelling in PICC line site      Current Outpatient Medications:     ascorbic acid, vitamin C, 250 mg chew, Take  by mouth., Disp: , Rfl:     ondansetron (ZOFRAN ODT) 4 mg disintegrating tablet, TAKE 1 TABLET AND ALLOW TO DISSOLVE EVERY 8 HOURS AS NEEDED FOR NAUSEA FOR 10 DAYS, Disp: , Rfl:     metoprolol succinate (TOPROL-XL) 25 mg XL tablet, Take 25 mg by mouth two (2) times a day., Disp: , Rfl:     diclofenac (VOLTAREN) 1 % gel, Apply 2 g to affected area four (4) times daily as needed for Pain (apply to hand). , Disp: , Rfl:     traZODone (DESYREL) 100 mg tablet, Take 100 mg by mouth nightly., Disp: , Rfl:     SUMAtriptan succinate 6 mg/0.5 mL kit, INJECT 0.5 ML ONCE AS NEEDED FOR MIGRAINE UP TO 1 DOSE, Disp: 9 Kit, Rfl: 6    galcanezumab-gnlm (EMGALITY PEN) 120 mg/mL injection, 1 mL by SubCUTAneous route every thirty (30) days. Monthly Rx to be filled starting 12/2019--Fill 240mg loading dose today, Disp: 1 mL, Rfl: 5    oxyCODONE-acetaminophen (PERCOCET 10)  mg per tablet, Take 1 Tab by mouth every eight (8) hours as needed for Pain., Disp: , Rfl:     multivitamin (ONE A DAY) tablet, Take 1 Tab by mouth daily. , Disp: , Rfl:     cyanocobalamin (VITAMIN B-12) 500 mcg tablet, Take 500 mcg by mouth daily. , Disp: , Rfl:     VITAMIN A/VITAMIN D2/COD LIVER (VITAMINS A & D PO), Take  by mouth.  , Disp: , Rfl:     CALCIUM CITRATE/VITAMIN D3 (CALCIUM CITRATE + D PO), Take 1 Tab by mouth daily. , Disp: , Rfl:     PROPRANOLOL HCL (INDERAL PO), Take  by mouth. 10 MG NIGHTLY, Disp: , Rfl:     aspirin delayed-release 81 mg tablet, Take 81 mg by mouth daily. , Disp: , Rfl:     lansoprazole (PREVACID) 30 mg capsule, Take  by mouth two (2) times a day., Disp: , Rfl:     lamoTRIgine (LAMICTAL) 100 mg tablet, Take 200 mg by mouth two (2) times a day., Disp: , Rfl:     metoclopramide (REGLAN) 10 mg tablet, Take 10 mg by mouth two (2) times a day., Disp: , Rfl:     galcanezumab-gnlm (Emgality Pen) 120 mg/mL injection, 1 mL by SubCUTAneous route every thirty (30) days. , Disp: 2 mL, Rfl: 0    dihydroergotamine (MIGRANAL) 0.5 mg/pump act. (4 mg/mL) nasal spray, One spray in each nostril and repeat in 15 minutes X 1 .  No more then 4 sprays in 24 hours, Disp: 8 Bottle, Rfl: 3      Physical Exam:    Vitals: afebrile    · GEN: NAD  · HEENT: No icterus no thrush   · Lungs: Clear to auscultation bilaterally  · Abdomen: soft, non distended, non tender,  · MSK  no edema, R wrist wound healing, no erythema or discharge, is able to flex and extend his wrist , able to flex all fingers fully with the exception of the fifth digit , no increased warmth, + scar tissue over site , left upper extremity PICC line without any erythema  · Neuro: Alert, oriented to time, place and situation, moves all extremities to commands, verbal , ambulatory independently  · Skin: no rash  · Psych: good affect, good eye contact, non tearful         Labs:     Reviewed from 5/27/2020  White count 11 hemoglobin 11.7 creatinine 0.57 platelets 867, alk phos 148, ESR 55, CRP 59, AST 17, ALT 5      Microbiology Data:       Blood: 5/12/20  Component Value Ref Range & Units Status   Special Requests: NO SPECIAL REQUESTS    Preliminary   Culture result: NO GROWTH 3 DAYS    Preliminary   Result History       5/12/20  Wound Drainage         Component Value Ref Range & Units Status   Special Requests: NO SPECIAL REQUESTS    Final   GRAM STAIN NO WBC'S SEEN    Final   GRAM STAIN NO ORGANISMS SEEN    Final   Culture result: Abnormal     Final   MODERATE STAPHYLOCOCCUS AUREUS    Susceptibility      Staphylococcus aureus     PAWEL    Beta Lactamase Positive ug/mL S    Ciprofloxacin ($) <=0.5 ug/mL S    Clindamycin ($) 0.25 ug/mL S    Daptomycin ($$$$$) 0.25 ug/mL S    Doxycycline ($$) <=0.5 ug/mL S    Erythromycin ($$$$) >=8 ug/mL R    Gentamicin ($) <=0.5 ug/mL S    Levofloxacin ($) 0.25 ug/mL S    Linezolid ($$$$$) 2 ug/mL S    Moxifloxacin ($$$$) <=0.25 ug/mL S    Oxacillin 0.5 ug/mL S    Rifampin ($$$$) <=0.5 ug/mL S1    Tetracycline <=1 ug/mL S    Trimeth/Sulfa <=10 ug/mL S    Vancomycin ($) 1 ug/mL S        Abscess         Component Value Ref Range & Units Status   Special Requests: RIGHT HAND ABSCESS   Final   GRAM STAIN OCCASIONAL WBCS SEEN    Final   GRAM STAIN NO ORGANISMS SEEN    Final   Culture result: Abnormal     Final   LIGHT STAPHYLOCOCCUS AUREUS    Susceptibility      Staphylococcus aureus     PAWEL    Beta Lactamase Positive ug/mL S    Ciprofloxacin ($) <=0.5 ug/mL S    Clindamycin ($) 0.25 ug/mL S    Daptomycin ($$$$$) 0.25 ug/mL S    Doxycycline ($$) <=0.5 ug/mL S    Erythromycin ($$$$) <=0.25 ug/mL S    Gentamicin ($) <=0.5 ug/mL S    Levofloxacin ($) 0.25 ug/mL S    Linezolid ($$$$$) 2 ug/mL S    Moxifloxacin ($$$$) <=0.25 ug/mL S    Oxacillin 0.5 ug/mL S    Rifampin ($$$$) <=0.5 ug/mL S1    Tetracycline <=1 ug/mL S    Trimeth/Sulfa <=10 ug/mL S    Vancomycin ($) 1 ug/mL S                Imagin20 Wrist X ray   FINDINGS: Two views of the right wrist demonstrate no fracture or other acute  osseous or articular abnormality.  The soft tissues are within normal limits.     IMPRESSION  IMPRESSION: No acute abnormality      Procedures:   Irrigation and debridement of right wrist flexor tenosynovitis with extensive tenosynovectomy    Assessment / Plan:     Mr Vaughan is a 61 yr old gentleman wt hx of CAD, DM, S/P gastric bypass  for morbid obesity who had a carpel tunnel release early May 2020 with MSSA tenosynovitis ( R wrist)    Had I and D done 5/12/20 wt operative report indicating purulent material within carpal tunnel. Cultures at [facility] OF [facility]. 1) MSSA tenosynovitis: R wrist   Currently on IV cefazolin 2 g every 8 hours  Recent labs from 5/27/2020 indicate very mild leukocytosis with inflammatory markers that are still elevated  Plan to stop IV therapy and transition him to p.o. cefazolin as he is able to pills   Will plan for Keflex 500 mg 3 times a day for another 2 weeks  Remove PICC line   Follow-up with Ortho and wound care  Counseled to follow-up for signs and symptoms of infection that was discussed with him  Counseled regarding side effects of antibiotics/risk for seeding/diarrhea  We will plan to see him 1-2 weeks after he is completed his antibiotics to ensure no new symptoms  Probiotics/yogurt advice daily  Smoking cessation emphasized to help with wound healing    Antibiotic side effects/adverse effects/toxicities discussed including gastrointestinal, renal, hematological , ability to lower siezure threshold, risk for C diff infection. Probiotics, daily yogurt encouraged. 2) DM    3) Gastric bypass hx for morbid obesity    4) Smoking cessation emphasized     5) Hx of cervical fusion     6) Rib and heel fracture hx per patient     Thank for the opportunity to participate in the care of this patient. Please contact with questions or concerns.      Cindi Lawson DO  10:44 AM

## 2020-05-29 NOTE — PROGRESS NOTES
Room 3    Pt verbally agrees to labs, orders, and others to be mailed to confirmed home address. Identified pt with two pt identifiers(name and ). Reviewed record in preparation for visit and have obtained necessary documentation. All patient medications has been reviewed. Chief Complaint   Patient presents with    Follow Up Chronic Condition     MSSA tenosynovitis    Skin Infection     right wrist       Health Maintenance Due   Topic    Hepatitis C Screening     Pneumococcal 0-64 years (1 of 1 - PPSV23)    Foot Exam Q1     A1C test (Diabetic or Prediabetic)     MICROALBUMIN Q1     Eye Exam Retinal or Dilated     Lipid Screen     DTaP/Tdap/Td series (1 - Tdap)    Shingrix Vaccine Age 50> (1 of 2)    FOBT Q1Y Age 54-65     Medicare Yearly Exam        Vitals:    20 0912   BP: 100/60   Pulse: 80   Resp: 18   Temp: 97.9 °F (36.6 °C)   TempSrc: Oral   SpO2: 99%   Weight: 161 lb 9.6 oz (73.3 kg)   PainSc:   6   PainLoc: Wrist       Wt Readings from Last 3 Encounters:   20 161 lb 9.6 oz (73.3 kg)   20 168 lb (76.2 kg)   19 162 lb (73.5 kg)     Temp Readings from Last 3 Encounters:   20 97.9 °F (36.6 °C) (Oral)   20 97.4 °F (36.3 °C)   19 98.3 °F (36.8 °C)     BP Readings from Last 3 Encounters:   20 100/60   20 133/72   19 140/80     Pulse Readings from Last 3 Encounters:   20 80   20 61   19 86       No results found for: HBA1C, HGBE8, WML3UXEA, PAT5DXUI, YIO7NADN    Coordination of Care Questionnaire:   1) Have you been to an emergency room, urgent care, or hospitalized since your last visit? yes     2020 - 2020 (4 days)  Niranjan Rousseau MD   Last attending  Treatment team      2. Have seen or consulted any other health care provider since your last visit?  YES  2020 Office Visit OV [address], [address] [place] [address] 83,[address]    [address], [address]   [phone]   [address] [facility], Frank Barajas MD       05/21/2020 Office Visit [address]   [address] [facility], [address] [place] [address],[address]    [place], [address] [place] [address]   [phone]   Mahesh Quach MD       3) Do you have an Advanced Directive/ Living Will in place? NO, code status on file. If yes, do we have a copy on file NO  If no, would you like information NO    Patient is accompanied by self I have received verbal consent from Carol Alanis to discuss any/all medical information while they are present in the room.

## 2020-05-29 NOTE — TELEPHONE ENCOUNTER
Two pt identifiers confirmed. Informed Waqar Oliveira (Pharmacist) per Dr. Naseem Estrada:    Kailey Navas, DO Beulah Harris             Please have home health remove picc line   Changing from IV to PO antibiotics      Waqar Oliveira verbalized understanding of information discussed w/ no further questions at this time.

## 2020-06-02 ENCOUNTER — PATIENT OUTREACH (OUTPATIENT)
Dept: CARDIOLOGY CLINIC | Age: 60
End: 2020-06-02

## 2020-06-02 NOTE — PROGRESS NOTES
Patient resolved from Transition of Care episode on 6/2/20. ACM/CTN was unsuccessful at contacting this patient today. Patient/family was provided the following resources and education related to COVID-19 during the initial call:                         Signs, symptoms and red flags related to COVID-19            CDC exposure and quarantine guidelines            Con[employer]it exposure contact - [phone]            Contact for their local Department of Health                 Patient has not had any additional ED or hospital visits. No further outreach scheduled with this CTN/ACM. Episode of Care resolved. Patient has this CTN/ACM contact information if future needs arise.

## 2020-08-20 ENCOUNTER — TELEPHONE (OUTPATIENT)
Dept: NEUROLOGY | Age: 60
End: 2020-08-20

## 2020-09-25 ENCOUNTER — TELEPHONE (OUTPATIENT)
Dept: NEUROLOGY | Age: 60
End: 2020-09-25

## 2020-09-25 DIAGNOSIS — G43.709 CHRONIC MIGRAINE WITHOUT AURA WITHOUT STATUS MIGRAINOSUS, NOT INTRACTABLE: ICD-10-CM

## 2020-10-01 RX ORDER — GALCANEZUMAB 120 MG/ML
120 INJECTION, SOLUTION SUBCUTANEOUS
Qty: 1 ML | Refills: 0 | Status: SHIPPED | OUTPATIENT
Start: 2020-10-01 | End: 2020-10-14 | Stop reason: SDUPTHER

## 2020-10-01 NOTE — TELEPHONE ENCOUNTER
Sent one time Emgality dose to [facility].  Pt has appt on 10/14.   [employer] link texted to pt to sign up

## 2020-10-14 ENCOUNTER — OFFICE VISIT (OUTPATIENT)
Dept: NEUROLOGY | Age: 60
End: 2020-10-14
Payer: MEDICARE

## 2020-10-14 VITALS
HEART RATE: 114 BPM | TEMPERATURE: 97.9 F | RESPIRATION RATE: 20 BRPM | SYSTOLIC BLOOD PRESSURE: 138 MMHG | WEIGHT: 161 LBS | OXYGEN SATURATION: 97 % | DIASTOLIC BLOOD PRESSURE: 80 MMHG | BODY MASS INDEX: 21.84 KG/M2

## 2020-10-14 DIAGNOSIS — G43.709 CHRONIC MIGRAINE WITHOUT AURA WITHOUT STATUS MIGRAINOSUS, NOT INTRACTABLE: ICD-10-CM

## 2020-10-14 PROCEDURE — 99213 OFFICE O/P EST LOW 20 MIN: CPT | Performed by: PSYCHIATRY & NEUROLOGY

## 2020-10-14 PROCEDURE — G8752 SYS BP LESS 140: HCPCS | Performed by: PSYCHIATRY & NEUROLOGY

## 2020-10-14 PROCEDURE — 3017F COLORECTAL CA SCREEN DOC REV: CPT | Performed by: PSYCHIATRY & NEUROLOGY

## 2020-10-14 PROCEDURE — G8510 SCR DEP NEG, NO PLAN REQD: HCPCS | Performed by: PSYCHIATRY & NEUROLOGY

## 2020-10-14 PROCEDURE — G8754 DIAS BP LESS 90: HCPCS | Performed by: PSYCHIATRY & NEUROLOGY

## 2020-10-14 PROCEDURE — G8427 DOCREV CUR MEDS BY ELIG CLIN: HCPCS | Performed by: PSYCHIATRY & NEUROLOGY

## 2020-10-14 PROCEDURE — G8420 CALC BMI NORM PARAMETERS: HCPCS | Performed by: PSYCHIATRY & NEUROLOGY

## 2020-10-14 RX ORDER — CEFUROXIME AXETIL 250 MG/1
TABLET ORAL
Qty: 9 KIT | Refills: 6 | Status: SHIPPED | OUTPATIENT
Start: 2020-10-14 | End: 2021-10-18

## 2020-10-14 RX ORDER — GALCANEZUMAB 120 MG/ML
120 INJECTION, SOLUTION SUBCUTANEOUS
Qty: 1 ML | Refills: 5 | Status: SHIPPED | OUTPATIENT
Start: 2020-10-14 | End: 2021-05-04

## 2020-10-14 NOTE — PROGRESS NOTES
[facility] and [address] at [facility] at [address] [facility], [address] [facility] [address] [facility], [address] [place] [address]   [phone]              Chief Complaint   Patient presents with    Migraine     Current Outpatient Medications   Medication Sig Dispense Refill    galcanezumab-gnlm (Emgality Pen) 120 mg/mL injection 1 mL by SubCUTAneous route every twenty-eight (28) days. 1 mL 0    ascorbic acid, vitamin C, 250 mg chew Take  by mouth.  ondansetron (ZOFRAN ODT) 4 mg disintegrating tablet TAKE 1 TABLET AND ALLOW TO DISSOLVE EVERY 8 HOURS AS NEEDED FOR NAUSEA FOR 10 DAYS      metoprolol succinate (TOPROL-XL) 25 mg XL tablet Take 25 mg by mouth two (2) times a day.  diclofenac (VOLTAREN) 1 % gel Apply 2 g to affected area four (4) times daily as needed for Pain (apply to hand).  traZODone (DESYREL) 100 mg tablet Take 100 mg by mouth nightly.  multivitamin (ONE A DAY) tablet Take 1 Tab by mouth daily.  cyanocobalamin (VITAMIN B-12) 500 mcg tablet Take 500 mcg by mouth daily.  VITAMIN A/VITAMIN D2/COD LIVER (VITAMINS A & D PO) Take  by mouth.  CALCIUM CITRATE/VITAMIN D3 (CALCIUM CITRATE + D PO) Take 1 Tab by mouth daily.  aspirin delayed-release 81 mg tablet Take 81 mg by mouth daily.  lansoprazole (PREVACID) 30 mg capsule Take  by mouth two (2) times a day.  lamoTRIgine (LAMICTAL) 100 mg tablet Take 200 mg by mouth two (2) times a day.  metoclopramide (REGLAN) 10 mg tablet Take 10 mg by mouth two (2) times a day.  cephALEXin (KEFLEX) 500 mg capsule Take 1 Cap by mouth three (3) times daily. 42 Cap 0    SUMAtriptan succinate 6 mg/0.5 mL kit INJECT 0.5 ML ONCE AS NEEDED FOR MIGRAINE UP TO 1 DOSE 9 Kit 6    oxyCODONE-acetaminophen (PERCOCET 10)  mg per tablet Take 1 Tab by mouth every eight (8) hours as needed for Pain.       dihydroergotamine (MIGRANAL) 0.5 mg/pump act. (4 mg/mL) nasal spray One spray in each nostril and repeat in 15 minutes X 1 . No more then 4 sprays in 24 hours (Patient not taking: Reported on 5/29/2020) 8 Bottle 3    PROPRANOLOL HCL (INDERAL PO) Take  by mouth. 10 MG NIGHTLY        No Known Allergies  Social History     Tobacco Use    Smoking status: Current Every Day Smoker     Packs/day: 1.00    Smokeless tobacco: Never Used   Substance Use Topics    Alcohol use: Yes     Comment: Rarely    Drug use: No     70-year-old man returns today for follow-up intractable migraine headaches. His last visit with me was approximately a year ago November 2019. At that time he was having 15+ headache days per month. We had him on Imitrex 6 mg injection and we started him on Emgality. He notes that with the [facility] he had significant improvement. Imitrex injections take away his headaches. He is happy with how he is doing. Stressed and had some increase headaches secondary to his brother being ill and him being the primary caregiver    Examination  Visit Vitals  /80 (BP 1 Location: Left arm, BP Patient Position: Sitting)   Pulse (!) 114   Temp 97.9 °F (36.6 °C)   Resp 20   Wt 73 kg (161 lb)   SpO2 97%   BMI 21.84 kg/m²     Pleasant gentleman. Awake alert oriented and conversant. Normal speech and language. No ataxia. Steady gait    Impression/Plan  61year-old gentleman with intractable migraines doing well on a combination of Emgality and Imitrex injections. Continue this. Follow in the summer    Hayde Lemons MD      This note was created using voice recognition software. Despite editing, there may be syntax errors. This note will not be viewable in [address]

## 2020-10-14 NOTE — PROGRESS NOTES
Chief Complaint   Patient presents with    Migraine     Had migraine/HA starting last week ending yesterday    15/30 HA days.   Brother is in hospital so pt is under a lot of stressed

## 2021-01-06 ENCOUNTER — TELEPHONE (OUTPATIENT)
Dept: NEUROLOGY | Age: 61
End: 2021-01-06

## 2021-01-06 ENCOUNTER — OFFICE VISIT (OUTPATIENT)
Dept: NEUROLOGY | Age: 61
End: 2021-01-06
Payer: MEDICARE

## 2021-01-06 VITALS
BODY MASS INDEX: 21.81 KG/M2 | HEART RATE: 90 BPM | DIASTOLIC BLOOD PRESSURE: 70 MMHG | HEIGHT: 72 IN | WEIGHT: 161 LBS | OXYGEN SATURATION: 96 % | TEMPERATURE: 97.3 F | SYSTOLIC BLOOD PRESSURE: 126 MMHG | RESPIRATION RATE: 20 BRPM

## 2021-01-06 DIAGNOSIS — R51.9 WORST HEADACHE OF LIFE: Primary | ICD-10-CM

## 2021-01-06 PROCEDURE — G8752 SYS BP LESS 140: HCPCS | Performed by: PSYCHIATRY & NEUROLOGY

## 2021-01-06 PROCEDURE — G8420 CALC BMI NORM PARAMETERS: HCPCS | Performed by: PSYCHIATRY & NEUROLOGY

## 2021-01-06 PROCEDURE — G8427 DOCREV CUR MEDS BY ELIG CLIN: HCPCS | Performed by: PSYCHIATRY & NEUROLOGY

## 2021-01-06 PROCEDURE — 3017F COLORECTAL CA SCREEN DOC REV: CPT | Performed by: PSYCHIATRY & NEUROLOGY

## 2021-01-06 PROCEDURE — 99214 OFFICE O/P EST MOD 30 MIN: CPT | Performed by: PSYCHIATRY & NEUROLOGY

## 2021-01-06 PROCEDURE — G8754 DIAS BP LESS 90: HCPCS | Performed by: PSYCHIATRY & NEUROLOGY

## 2021-01-06 PROCEDURE — G8432 DEP SCR NOT DOC, RNG: HCPCS | Performed by: PSYCHIATRY & NEUROLOGY

## 2021-01-06 RX ORDER — RIMEGEPANT SULFATE 75 MG/75MG
TABLET, ORALLY DISINTEGRATING ORAL
Qty: 8 TAB | Refills: 3 | Status: SHIPPED | OUTPATIENT
Start: 2021-01-06 | End: 2021-04-27 | Stop reason: SDUPTHER

## 2021-01-06 NOTE — PROGRESS NOTES
Chief Complaint   Patient presents with    Headache     extreme HAs over the last 3 weeks that feel different from the migraines he normally has. Pain is all over head, not the front were his typical migraines occur. Has had about 15 of these HAs over the past 3 weeks.   Last Emgality dose was 12/20/20    The Emgality was working well and knocked migraine days down to approx 10 per mo

## 2021-01-06 NOTE — PROGRESS NOTES
[facility] and [address] at [facility] at [address] [facility], [address] [facility] [address] [facility], [address] [place] [address]   [phone]              Chief Complaint   Patient presents with    Headache     extreme HAs over the last 3 weeks that feel different from the migraines he normally has. Current Outpatient Medications   Medication Sig Dispense Refill    galcanezumab-gnlm (Emgality Pen) 120 mg/mL injection 1 mL by SubCUTAneous route every twenty-eight (28) days. 1 mL 5    SUMAtriptan succinate 6 mg/0.5 mL kit INJECT 0.5 ML ONCE AS NEEDED FOR MIGRAINE UP TO 1 DOSE 9 Kit 6    ascorbic acid, vitamin C, 250 mg chew Take  by mouth.  ondansetron (ZOFRAN ODT) 4 mg disintegrating tablet TAKE 1 TABLET AND ALLOW TO DISSOLVE EVERY 8 HOURS AS NEEDED FOR NAUSEA FOR 10 DAYS      cephALEXin (KEFLEX) 500 mg capsule Take 1 Cap by mouth three (3) times daily. 42 Cap 0    metoprolol succinate (TOPROL-XL) 25 mg XL tablet Take 25 mg by mouth two (2) times a day.  diclofenac (VOLTAREN) 1 % gel Apply 2 g to affected area four (4) times daily as needed for Pain (apply to hand).  traZODone (DESYREL) 100 mg tablet Take 100 mg by mouth nightly.  oxyCODONE-acetaminophen (PERCOCET 10)  mg per tablet Take 1 Tab by mouth every eight (8) hours as needed for Pain.  multivitamin (ONE A DAY) tablet Take 1 Tab by mouth daily.  cyanocobalamin (VITAMIN B-12) 500 mcg tablet Take 500 mcg by mouth daily.  VITAMIN A/VITAMIN D2/COD LIVER (VITAMINS A & D PO) Take  by mouth.  CALCIUM CITRATE/VITAMIN D3 (CALCIUM CITRATE + D PO) Take 1 Tab by mouth daily.  PROPRANOLOL HCL (INDERAL PO) Take  by mouth. 10 MG NIGHTLY      aspirin delayed-release 81 mg tablet Take 81 mg by mouth daily.  lansoprazole (PREVACID) 30 mg capsule Take  by mouth two (2) times a day.       lamoTRIgine (LAMICTAL) 100 mg tablet Take 200 mg by mouth two (2) times a day.  metoclopramide (REGLAN) 10 mg tablet Take 10 mg by mouth two (2) times a day.  dihydroergotamine (MIGRANAL) 0.5 mg/pump act. (4 mg/mL) nasal spray One spray in each nostril and repeat in 15 minutes X 1 . No more then 4 sprays in 24 hours (Patient not taking: Reported on 5/29/2020) 8 Bottle 3      No Known Allergies  Social History     Tobacco Use    Smoking status: Current Every Day Smoker     Packs/day: 1.00    Smokeless tobacco: Never Used   Substance Use Topics    Alcohol use: Yes     Comment: Rarely    Drug use: No   27-year-old man comes today for follow-up migraine headaches. Last visit with me was October and at that time he was doing well on Emgality for prevention and Imitrex for abortive therapy    Today he reports new complaint. He has had the abrupt onset of a headache lasting 3 weeks which was not like his typical headache. He notes it was nothing like his migraine. He had the worst headache that he is ever had and it has been persistent. Throbbing in nature feels like his heartbeat. It is all over his head. He was debilitated had to stay in bed. He went over to [facility] to go to the emergency room and they told him it would be a 10-hour wait and so he left and went back home. He could not drive. He did not go hunting. He missed family activities over the holidays. His vision was blurred. Imitrex injections were not helpful. He actually purchased some Imitrex as it was not time to refill and it did not help him. No focal deficits. No loss of consciousness. Examination  Visit Vitals  /70 (BP 1 Location: Left arm, BP Patient Position: Sitting)   Pulse 90   Temp 97.3 °F (36.3 °C)   Resp 20   Ht 6' (1.829 m)   Wt 73 kg (161 lb)   SpO2 96%   BMI 21.84 kg/m²     Neurologically, he is awake, alert, oriented with normal speech, language, and cognition.  Visual fields are full to direct confrontational testing. He has sharp disk margins bilaterally. Pupils react bilaterally. He has full versions without nystagmus. Face is symmetrical with symmetrical facial sensation. Tongue and palate are midline. Shoulder shrug is symmetrical. Hearing is intact to conversation. He has normal bulk and tone. There is no abnormal movement. There is no pronation or drift. He is able to generate full strength in the upper and lower extremities and all muscle groups to direct confrontational testing. Reflexes are symmetrical in the upper and lower extremities bilaterally. No pathologic reflexes are elicited. He has no ataxia or past pointing. He is able to ambulate without difficulty  Impression/Plan    Migraine headache which [facility]  Will try some Nurte as well for abortive therapy     New onset of an atypical type headache for him and certainly concerning given he describes as worse headache of his life and was debilitating. Given this he will get CT and CTA of the head    Follow-up after scans      Moody Echols MD  This note was created using voice recognition software. Despite editing, there may be syntax errors.

## 2021-01-11 ENCOUNTER — HOSPITAL ENCOUNTER (OUTPATIENT)
Dept: CT IMAGING | Age: 61
Discharge: HOME | End: 2021-01-11
Attending: PSYCHIATRY & NEUROLOGY
Payer: MEDICARE

## 2021-01-11 DIAGNOSIS — R51.9 WORST HEADACHE OF LIFE: ICD-10-CM

## 2021-01-11 PROCEDURE — 74011000636 HC RX REV CODE- 636: Performed by: RADIOLOGY

## 2021-01-11 PROCEDURE — 70498 CT ANGIOGRAPHY NECK: CPT

## 2021-01-11 PROCEDURE — 70450 CT HEAD/BRAIN W/O DYE: CPT

## 2021-01-11 RX ADMIN — IOPAMIDOL 100 ML: 755 INJECTION, SOLUTION INTRAVENOUS at 15:20

## 2021-04-27 ENCOUNTER — OFFICE VISIT (OUTPATIENT)
Dept: NEUROLOGY | Age: 61
End: 2021-04-27
Payer: MEDICARE

## 2021-04-27 VITALS — BODY MASS INDEX: 21.7 KG/M2 | WEIGHT: 160 LBS | OXYGEN SATURATION: 96 % | RESPIRATION RATE: 18 BRPM

## 2021-04-27 DIAGNOSIS — G43.019 INTRACTABLE MIGRAINE WITHOUT AURA AND WITHOUT STATUS MIGRAINOSUS: Primary | ICD-10-CM

## 2021-04-27 PROCEDURE — G8510 SCR DEP NEG, NO PLAN REQD: HCPCS | Performed by: PSYCHIATRY & NEUROLOGY

## 2021-04-27 PROCEDURE — 99214 OFFICE O/P EST MOD 30 MIN: CPT | Performed by: PSYCHIATRY & NEUROLOGY

## 2021-04-27 PROCEDURE — G8756 NO BP MEASURE DOC: HCPCS | Performed by: PSYCHIATRY & NEUROLOGY

## 2021-04-27 PROCEDURE — 3017F COLORECTAL CA SCREEN DOC REV: CPT | Performed by: PSYCHIATRY & NEUROLOGY

## 2021-04-27 PROCEDURE — G8420 CALC BMI NORM PARAMETERS: HCPCS | Performed by: PSYCHIATRY & NEUROLOGY

## 2021-04-27 PROCEDURE — G8427 DOCREV CUR MEDS BY ELIG CLIN: HCPCS | Performed by: PSYCHIATRY & NEUROLOGY

## 2021-04-27 RX ORDER — ONDANSETRON 4 MG/1
TABLET, ORALLY DISINTEGRATING ORAL
Qty: 20 TAB | Refills: 5 | Status: SHIPPED | OUTPATIENT
Start: 2021-04-27 | End: 2021-07-16

## 2021-04-27 RX ORDER — RIMEGEPANT SULFATE 75 MG/75MG
TABLET, ORALLY DISINTEGRATING ORAL
Qty: 8 TAB | Refills: 5 | Status: SHIPPED | OUTPATIENT
Start: 2021-04-27 | End: 2021-08-23

## 2021-04-27 NOTE — PROGRESS NOTES
[facility] and [address] at [facility] Neurology Clinics at [address], [address] [place] [address] [facility], [address] [place] [address]   [phone]              Chief Complaint   Patient presents with    Migraine     Current Outpatient Medications   Medication Sig Dispense Refill    ondansetron (ZOFRAN ODT) 4 mg disintegrating tablet TAKE 1 TABLET AND ALLOW TO DISSOLVE EVERY 8 HOURS AS NEEDED FOR NAUSEA FOR 10 DAYS 20 Tab 5    rimegepant (Nurtec ODT) 75 mg disintegrating tablet 1 at migraine onset  Max 1 tab/24 hours 8 Tab 5    galcanezumab-gnlm (Emgality Pen) 120 mg/mL injection 1 mL by SubCUTAneous route every twenty-eight (28) days. 1 mL 5    SUMAtriptan succinate 6 mg/0.5 mL kit INJECT 0.5 ML ONCE AS NEEDED FOR MIGRAINE UP TO 1 DOSE 9 Kit 6    ascorbic acid, vitamin C, 250 mg chew Take  by mouth.  cephALEXin (KEFLEX) 500 mg capsule Take 1 Cap by mouth three (3) times daily. 42 Cap 0    metoprolol succinate (TOPROL-XL) 25 mg XL tablet Take 25 mg by mouth two (2) times a day.  diclofenac (VOLTAREN) 1 % gel Apply 2 g to affected area four (4) times daily as needed for Pain (apply to hand).  traZODone (DESYREL) 100 mg tablet Take 100 mg by mouth nightly.  oxyCODONE-acetaminophen (PERCOCET 10)  mg per tablet Take 1 Tab by mouth every eight (8) hours as needed for Pain.  dihydroergotamine (MIGRANAL) 0.5 mg/pump act. (4 mg/mL) nasal spray One spray in each nostril and repeat in 15 minutes X 1 . No more then 4 sprays in 24 hours 8 Bottle 3    multivitamin (ONE A DAY) tablet Take 1 Tab by mouth daily.  cyanocobalamin (VITAMIN B-12) 500 mcg tablet Take 500 mcg by mouth daily.  VITAMIN A/VITAMIN D2/COD LIVER (VITAMINS A & D PO) Take  by mouth.  CALCIUM CITRATE/VITAMIN D3 (CALCIUM CITRATE + D PO) Take 1 Tab by mouth daily.       PROPRANOLOL HCL (INDERAL PO) Take  by mouth. 10 MG NIGHTLY      aspirin delayed-release 81 mg tablet Take 81 mg by mouth daily.  lansoprazole (PREVACID) 30 mg capsule Take  by mouth two (2) times a day.  lamoTRIgine (LAMICTAL) 100 mg tablet Take 200 mg by mouth two (2) times a day.  metoclopramide (REGLAN) 10 mg tablet Take 10 mg by mouth two (2) times a day. No Known Allergies  Social History     Tobacco Use    Smoking status: Current Every Day Smoker     Packs/day: 1.00    Smokeless tobacco: Never Used   Substance Use Topics    Alcohol use: Yes     Comment: Rarely    Drug use: No     61-year-old gentleman returns today for follow-up intractable migraine headaches. He is on Emgality for prevention. Has Nurtec for abortive therapy. Also has Imitrex injections. He notes that his headache frequency has worsened. He attributes that to increased stress. His father unfortunately passed away. There has been some friction with his siblings. He is tolerating medicines without difficulty. Needs some antinausea medicine as that has been worse with his migraines. He has tolerated Zofran in the past.  He has had some episodes of dizziness that he attributes to panic. Examination  Visit Vitals  Resp 18   Wt 72.6 kg (160 lb)   SpO2 96%   BMI 21.70 kg/m²   Very pleasant gentleman. Awake alert oriented and conversant. Normal speech and language. No focality. Impression/Plan  Migraine headaches intractable with some worsening as noted  Continue Emgality  Continue Nurtec  Continue Imitrex  Zofran refilled  Discussed the situational issue as above and he is confident as that corrects itself headaches will come back in line  Take a wait-and-see approach  Follow-up in 6 months    Starla Kussmaul, MD          This note was created using voice recognition software. Despite editing, there may be syntax errors.

## 2021-04-27 NOTE — PROGRESS NOTES
Chief Complaint   Patient presents with    Migraine     Seems to have gotten worse. Father passed recently and has had a lot of stress. 10-15 HAs per mo.     Last Emgality was 4/20/21

## 2021-05-04 DIAGNOSIS — G43.709 CHRONIC MIGRAINE WITHOUT AURA WITHOUT STATUS MIGRAINOSUS, NOT INTRACTABLE: ICD-10-CM

## 2021-05-04 RX ORDER — GALCANEZUMAB 120 MG/ML
INJECTION, SOLUTION SUBCUTANEOUS
Qty: 1 SYRINGE | Refills: 5 | Status: SHIPPED | OUTPATIENT
Start: 2021-05-04 | End: 2021-10-13

## 2021-07-16 RX ORDER — ONDANSETRON 4 MG/1
TABLET, ORALLY DISINTEGRATING ORAL
Qty: 20 TABLET | Refills: 5 | Status: SHIPPED | OUTPATIENT
Start: 2021-07-16 | End: 2021-09-30

## 2021-08-23 RX ORDER — RIMEGEPANT SULFATE 75 MG/75MG
TABLET, ORALLY DISINTEGRATING ORAL
Qty: 8 TABLET | Refills: 5 | Status: SHIPPED | OUTPATIENT
Start: 2021-08-23 | End: 2022-02-01

## 2021-09-30 RX ORDER — ONDANSETRON 4 MG/1
TABLET, ORALLY DISINTEGRATING ORAL
Qty: 20 TABLET | Refills: 5 | Status: SHIPPED | OUTPATIENT
Start: 2021-09-30 | End: 2021-12-23

## 2021-10-13 DIAGNOSIS — G43.709 CHRONIC MIGRAINE WITHOUT AURA WITHOUT STATUS MIGRAINOSUS, NOT INTRACTABLE: ICD-10-CM

## 2021-10-13 RX ORDER — GALCANEZUMAB 120 MG/ML
INJECTION, SOLUTION SUBCUTANEOUS
Qty: 1 ML | Refills: 5 | Status: SHIPPED | OUTPATIENT
Start: 2021-10-13 | End: 2022-08-05

## 2021-10-18 RX ORDER — CEFUROXIME AXETIL 250 MG/1
TABLET ORAL
Qty: 4 KIT | Refills: 15 | Status: SHIPPED | OUTPATIENT
Start: 2021-10-18 | End: 2022-10-27

## 2021-12-23 RX ORDER — ONDANSETRON 4 MG/1
TABLET, ORALLY DISINTEGRATING ORAL
Qty: 20 TABLET | Refills: 5 | Status: SHIPPED | OUTPATIENT
Start: 2021-12-23

## 2022-02-01 RX ORDER — RIMEGEPANT SULFATE 75 MG/75MG
TABLET, ORALLY DISINTEGRATING ORAL
Qty: 8 TABLET | Refills: 5 | Status: SHIPPED | OUTPATIENT
Start: 2022-02-01 | End: 2022-09-01

## 2022-03-20 PROBLEM — L03.113 CELLULITIS OF RIGHT WRIST: Status: ACTIVE | Noted: 2020-05-12

## 2022-08-01 NOTE — PROGRESS NOTES
Bedside and Verbal shift change report given to Desirae Little (oncoming nurse) by Eleanor Puga (offgoing nurse). Report included the following information SBAR, Kardex and MAR. Render In Strict Bullet Format?: No Detail Level: Zone Plan: - continue benzoyl peroxide- over the counter \\n- start Doxycycline 100mg take one twice a day with meal, decrease to one daily\\n- start Clindamycin gel apply twice a day as spot treatment \\n- start Differin gel apply to face at bedtime - over the counter\\n- Prednisone 20mg take one in Am daily x7 days (before event) \\n- follow up 6 weeks Plan: - start Ciclopirox cream apply to spot on right arm twice a day x2 weeks

## 2022-08-05 DIAGNOSIS — G43.709 CHRONIC MIGRAINE WITHOUT AURA WITHOUT STATUS MIGRAINOSUS, NOT INTRACTABLE: ICD-10-CM

## 2022-08-05 RX ORDER — GALCANEZUMAB 120 MG/ML
INJECTION, SOLUTION SUBCUTANEOUS
Qty: 1 ML | Refills: 5 | Status: SHIPPED | OUTPATIENT
Start: 2022-08-05

## 2022-09-01 ENCOUNTER — OFFICE VISIT (OUTPATIENT)
Dept: NEUROLOGY | Age: 62
End: 2022-09-01
Payer: MEDICARE

## 2022-09-01 VITALS
DIASTOLIC BLOOD PRESSURE: 58 MMHG | OXYGEN SATURATION: 99 % | BODY MASS INDEX: 21.97 KG/M2 | WEIGHT: 162 LBS | HEART RATE: 64 BPM | RESPIRATION RATE: 18 BRPM | SYSTOLIC BLOOD PRESSURE: 130 MMHG

## 2022-09-01 DIAGNOSIS — G43.011 INTRACTABLE MIGRAINE WITHOUT AURA AND WITH STATUS MIGRAINOSUS: Primary | ICD-10-CM

## 2022-09-01 PROCEDURE — 3017F COLORECTAL CA SCREEN DOC REV: CPT | Performed by: PSYCHIATRY & NEUROLOGY

## 2022-09-01 PROCEDURE — G8510 SCR DEP NEG, NO PLAN REQD: HCPCS | Performed by: PSYCHIATRY & NEUROLOGY

## 2022-09-01 PROCEDURE — G8420 CALC BMI NORM PARAMETERS: HCPCS | Performed by: PSYCHIATRY & NEUROLOGY

## 2022-09-01 PROCEDURE — G8427 DOCREV CUR MEDS BY ELIG CLIN: HCPCS | Performed by: PSYCHIATRY & NEUROLOGY

## 2022-09-01 PROCEDURE — G8752 SYS BP LESS 140: HCPCS | Performed by: PSYCHIATRY & NEUROLOGY

## 2022-09-01 PROCEDURE — G8754 DIAS BP LESS 90: HCPCS | Performed by: PSYCHIATRY & NEUROLOGY

## 2022-09-01 PROCEDURE — 99214 OFFICE O/P EST MOD 30 MIN: CPT | Performed by: PSYCHIATRY & NEUROLOGY

## 2022-09-01 RX ORDER — DULOXETIN HYDROCHLORIDE 30 MG/1
CAPSULE, DELAYED RELEASE ORAL
COMMUNITY
Start: 2022-08-09

## 2022-09-01 RX ORDER — ROSUVASTATIN CALCIUM 20 MG/1
20 TABLET, COATED ORAL DAILY
COMMUNITY
Start: 2022-08-09

## 2022-09-01 RX ORDER — AMOXICILLIN AND CLAVULANATE POTASSIUM 875; 125 MG/1; MG/1
TABLET, FILM COATED ORAL
COMMUNITY
Start: 2022-08-30

## 2022-09-01 RX ORDER — DULOXETIN HYDROCHLORIDE 60 MG/1
60 CAPSULE, DELAYED RELEASE ORAL DAILY
COMMUNITY
Start: 2022-06-29

## 2022-09-01 RX ORDER — RIVAROXABAN 20 MG/1
TABLET, FILM COATED ORAL
COMMUNITY
Start: 2022-08-28

## 2022-09-01 NOTE — PROGRESS NOTES
[facility] Neurology [facility] and [address] at [facility] Neurology Clinics at [address] [place] [address], [address] [facility] [address] [facility], [address] [place] [address]   [phone]              Chief Complaint   Patient presents with    Migraine     Less frequent, more severe. Last Emgality dose was 8/20. Sumatriptan inj works better than Nurtec but still uses nurtec first.     Past Medical History:   Diagnosis Date    Anxiety     Arrhythmia     SINUS TACHY    Langford's esophagus 3/19/2012    CAD (coronary artery disease)     Carpal tunnel syndrome of right wrist     Cellulitis of right wrist 05/12/2020    Chronic pain     Depression     Diabetes mellitus ([address] 3/19/2012    GERD (gastroesophageal reflux disease)     Headache     Hearing loss     History of MI (myocardial infarction) 3-00    History of peptic ulcer disease 3/19/2012    HTN (hypertension) 3/19/2012    Memory loss     Morbid obesity ([address] 3/19/2012    2002-OPEN GASTRIC BYPASS    Other ill-defined conditions(799.89)     MIGRAINES    Psychiatric disorder     Reflux 3/19/2012    Sleep apnea, obstructive 3/19/2012    Unspecified adverse effect of anesthesia     HEADACHE    Unspecified sleep apnea     HX-PRIOR TO GASTRIC BYPASS      Current Outpatient Medications   Medication Sig Dispense Refill    rosuvastatin (CRESTOR) 20 mg tablet Take 20 mg by mouth daily. Xarelto 20 mg tab tablet       amoxicillin-clavulanate (AUGMENTIN) 875-125 mg per tablet       DULoxetine (CYMBALTA) 60 mg capsule Take 60 mg by mouth daily.       DULoxetine (CYMBALTA) 30 mg capsule TAKE 1 ORAL CAPSULE ONCE A DAYTAKE WITH DULOXETINE 60MG FOR TOTAL DAILY DOSE OF 90MG      Emgality Pen 120 mg/mL injection INJECT 1 ML BY SUBCUTANEOUS ROUTE EVERY TWENTY-EIGHT (28) DAYS 1 mL 5    Nurtec ODT 75 mg disintegrating tablet TAKE 1 TABLET BY MOUTH AT MIGRAINE ONSET MAX 1 TAB/24 HOURS 8 Tablet 5    ondansetron (ZOFRAN ODT) 4 mg disintegrating tablet TAKE 1 TABLET AND ALLOW TO DISSOLVE EVERY 8 HOURS AS NEEDED FOR NAUSEA FOR 10 DAYS 20 Tablet 5    SUMAtriptan succinate 6 mg/0.5 mL kit INJECT 0.5 ML ONCE AS NEEDED FOR MIGRAINE UP TO 1 DOSE *INS LIMITS 4ML/30 DAYS* 4 Kit 15    ascorbic acid, vitamin C, 250 mg chew Take  by mouth.      metoprolol succinate (TOPROL-XL) 25 mg XL tablet Take 25 mg by mouth daily. diclofenac (VOLTAREN) 1 % gel Apply 2 g to affected area four (4) times daily as needed for Pain (apply to hand). traZODone (DESYREL) 100 mg tablet Take 100 mg by mouth nightly. oxyCODONE-acetaminophen (PERCOCET 10)  mg per tablet Take 1 Tab by mouth every eight (8) hours as needed for Pain.      multivitamin (ONE A DAY) tablet Take 1 Tab by mouth daily. cyanocobalamin (VITAMIN B12) 500 mcg tablet Take 500 mcg by mouth daily. VITAMIN A/VITAMIN D2/COD LIVER (VITAMINS A & D PO) Take  by mouth. CALCIUM CITRATE/VITAMIN D3 (CALCIUM CITRATE + D PO) Take 1 Tab by mouth daily. lansoprazole (PREVACID) 30 mg capsule Take  by mouth two (2) times a day. lamoTRIgine (LAMICTAL) 100 mg tablet Take 200 mg by mouth two (2) times a day. metoclopramide HCl (REGLAN) 10 mg tablet Take 10 mg by mouth two (2) times a day. No Known Allergies  Social History     Tobacco Use    Smoking status: Every Day     Packs/day: 1.00     Types: Cigarettes    Smokeless tobacco: Never   Vaping Use    Vaping Use: Never used   Substance Use Topics    Alcohol use: Yes     Comment: Rarely    Drug use: No     69-year-old gentleman who presents today for follow-up of intractable migraine headache. He notes that the Emgality has done well in terms of decreasing his frequency and is happy with the frequency but he notes the intensity is worse. He is unable to treated effectively and he can have days of migraine ongoing. He notes that the Nurtec really is not effective.   The Imitrex injections worked much better for him.  I did confirm with him that he does have atrial fibrillation but he has not had a myocardial infarction as we discussed that we do not want to use triptans in patients with previous MI. He is having some consternation with his electrophysiologist versus his cardiologist in terms of the degree of atrial fibrillation that will require him to have a revision of his ablation. He is wearing a heart monitor now. He has not had any focal deficits. Examination  Visit Vitals  BP (!) 130/58 (BP 1 Location: Left upper arm, BP Patient Position: Sitting, BP Cuff Size: Adult)   Pulse 64   Resp 18   Wt 73.5 kg (162 lb)   SpO2 99%   BMI 21.97 kg/m²   Pleasant engaging gentleman. He is awake alert and oriented. He has normal speech and normal language. His cognition is normal..  No ataxia. His gait is steady. Impression/Plan  Intractable migraine headaches with worsening of intensity and inability to treat effectively  Stop Nurtec  Trial of Ubrelvy  Continue Emgality  Imitrex injections as needed with the caveat as above  Follow-up 3 months    Oswald Wagoner MD        This note was created using voice recognition software. Despite editing, there may be syntax errors.

## 2022-10-06 ENCOUNTER — APPOINTMENT (OUTPATIENT)
Dept: CT IMAGING | Age: 62
End: 2022-10-06
Attending: STUDENT IN AN ORGANIZED HEALTH CARE EDUCATION/TRAINING PROGRAM
Payer: MEDICARE

## 2022-10-06 ENCOUNTER — HOSPITAL ENCOUNTER (EMERGENCY)
Age: 62
Discharge: HOME | End: 2022-10-06
Attending: STUDENT IN AN ORGANIZED HEALTH CARE EDUCATION/TRAINING PROGRAM
Payer: MEDICARE

## 2022-10-06 ENCOUNTER — TELEPHONE (OUTPATIENT)
Dept: NEUROLOGY | Age: 62
End: 2022-10-06

## 2022-10-06 VITALS
DIASTOLIC BLOOD PRESSURE: 72 MMHG | RESPIRATION RATE: 16 BRPM | HEART RATE: 60 BPM | OXYGEN SATURATION: 99 % | TEMPERATURE: 97.7 F | HEIGHT: 72 IN | SYSTOLIC BLOOD PRESSURE: 141 MMHG | BODY MASS INDEX: 21.67 KG/M2 | WEIGHT: 160 LBS

## 2022-10-06 DIAGNOSIS — G43.011 INTRACTABLE MIGRAINE WITHOUT AURA AND WITH STATUS MIGRAINOSUS: Primary | ICD-10-CM

## 2022-10-06 DIAGNOSIS — R51.9 ACUTE NONINTRACTABLE HEADACHE, UNSPECIFIED HEADACHE TYPE: Primary | ICD-10-CM

## 2022-10-06 LAB
ANION GAP SERPL CALC-SCNC: 5 MMOL/L (ref 5–15)
BASOPHILS # BLD: 0.1 K/UL (ref 0–0.1)
BASOPHILS NFR BLD: 0 % (ref 0–1)
BUN SERPL-MCNC: 10 MG/DL (ref 6–20)
BUN/CREAT SERPL: 19 (ref 12–20)
CALCIUM SERPL-MCNC: 8.5 MG/DL (ref 8.5–10.1)
CHLORIDE SERPL-SCNC: 106 MMOL/L (ref 97–108)
CO2 SERPL-SCNC: 28 MMOL/L (ref 21–32)
CREAT SERPL-MCNC: 0.54 MG/DL (ref 0.7–1.3)
DIFFERENTIAL METHOD BLD: ABNORMAL
EOSINOPHIL # BLD: 0.2 K/UL (ref 0–0.4)
EOSINOPHIL NFR BLD: 2 % (ref 0–7)
ERYTHROCYTE [DISTWIDTH] IN BLOOD BY AUTOMATED COUNT: 14.1 % (ref 11.5–14.5)
GLUCOSE SERPL-MCNC: 127 MG/DL (ref 65–100)
HCT VFR BLD AUTO: 37.1 % (ref 36.6–50.3)
HGB BLD-MCNC: 11.8 G/DL (ref 12.1–17)
IMM GRANULOCYTES # BLD AUTO: 0.1 K/UL (ref 0–0.04)
IMM GRANULOCYTES NFR BLD AUTO: 0 % (ref 0–0.5)
LYMPHOCYTES # BLD: 1.7 K/UL (ref 0.8–3.5)
LYMPHOCYTES NFR BLD: 15 % (ref 12–49)
MAGNESIUM SERPL-MCNC: 2 MG/DL (ref 1.6–2.4)
MCH RBC QN AUTO: 30.3 PG (ref 26–34)
MCHC RBC AUTO-ENTMCNC: 31.8 G/DL (ref 30–36.5)
MCV RBC AUTO: 95.1 FL (ref 80–99)
MONOCYTES # BLD: 0.7 K/UL (ref 0–1)
MONOCYTES NFR BLD: 6 % (ref 5–13)
NEUTS SEG # BLD: 8.6 K/UL (ref 1.8–8)
NEUTS SEG NFR BLD: 77 % (ref 32–75)
NRBC # BLD: 0 K/UL (ref 0–0.01)
NRBC BLD-RTO: 0 PER 100 WBC
PLATELET # BLD AUTO: 257 K/UL (ref 150–400)
PMV BLD AUTO: 10.5 FL (ref 8.9–12.9)
POTASSIUM SERPL-SCNC: 4.7 MMOL/L (ref 3.5–5.1)
RBC # BLD AUTO: 3.9 M/UL (ref 4.1–5.7)
SODIUM SERPL-SCNC: 139 MMOL/L (ref 136–145)
WBC # BLD AUTO: 11.3 K/UL (ref 4.1–11.1)

## 2022-10-06 PROCEDURE — 80048 BASIC METABOLIC PNL TOTAL CA: CPT

## 2022-10-06 PROCEDURE — 74011250636 HC RX REV CODE- 250/636: Performed by: STUDENT IN AN ORGANIZED HEALTH CARE EDUCATION/TRAINING PROGRAM

## 2022-10-06 PROCEDURE — 70496 CT ANGIOGRAPHY HEAD: CPT

## 2022-10-06 PROCEDURE — 96374 THER/PROPH/DIAG INJ IV PUSH: CPT

## 2022-10-06 PROCEDURE — 74011000636 HC RX REV CODE- 636: Performed by: STUDENT IN AN ORGANIZED HEALTH CARE EDUCATION/TRAINING PROGRAM

## 2022-10-06 PROCEDURE — 96361 HYDRATE IV INFUSION ADD-ON: CPT

## 2022-10-06 PROCEDURE — 36415 COLL VENOUS BLD VENIPUNCTURE: CPT

## 2022-10-06 PROCEDURE — 83735 ASSAY OF MAGNESIUM: CPT

## 2022-10-06 PROCEDURE — 99285 EMERGENCY DEPT VISIT HI MDM: CPT

## 2022-10-06 PROCEDURE — 70450 CT HEAD/BRAIN W/O DYE: CPT

## 2022-10-06 PROCEDURE — 85025 COMPLETE CBC W/AUTO DIFF WBC: CPT

## 2022-10-06 PROCEDURE — 96375 TX/PRO/DX INJ NEW DRUG ADDON: CPT

## 2022-10-06 RX ORDER — DEXAMETHASONE SODIUM PHOSPHATE 10 MG/ML
6 INJECTION INTRAMUSCULAR; INTRAVENOUS ONCE
Status: COMPLETED | OUTPATIENT
Start: 2022-10-06 | End: 2022-10-06

## 2022-10-06 RX ORDER — PROCHLORPERAZINE EDISYLATE 5 MG/ML
10 INJECTION INTRAMUSCULAR; INTRAVENOUS
Status: COMPLETED | OUTPATIENT
Start: 2022-10-06 | End: 2022-10-06

## 2022-10-06 RX ORDER — BUTALBITAL, ACETAMINOPHEN AND CAFFEINE 300; 40; 50 MG/1; MG/1; MG/1
1 CAPSULE ORAL
Qty: 6 CAPSULE | Refills: 0 | Status: SHIPPED | OUTPATIENT
Start: 2022-10-06

## 2022-10-06 RX ORDER — DIPHENHYDRAMINE HYDROCHLORIDE 50 MG/ML
25 INJECTION, SOLUTION INTRAMUSCULAR; INTRAVENOUS
Status: COMPLETED | OUTPATIENT
Start: 2022-10-06 | End: 2022-10-06

## 2022-10-06 RX ADMIN — PROCHLORPERAZINE EDISYLATE 10 MG: 5 INJECTION INTRAMUSCULAR; INTRAVENOUS at 11:41

## 2022-10-06 RX ADMIN — IOPAMIDOL 100 ML: 755 INJECTION, SOLUTION INTRAVENOUS at 12:16

## 2022-10-06 RX ADMIN — SODIUM CHLORIDE 1000 ML: 9 INJECTION, SOLUTION INTRAVENOUS at 11:42

## 2022-10-06 RX ADMIN — DEXAMETHASONE SODIUM PHOSPHATE 6 MG: 10 INJECTION, SOLUTION INTRAMUSCULAR; INTRAVENOUS at 11:41

## 2022-10-06 RX ADMIN — DIPHENHYDRAMINE HYDROCHLORIDE 25 MG: 50 INJECTION, SOLUTION INTRAMUSCULAR; INTRAVENOUS at 11:41

## 2022-10-06 NOTE — DISCHARGE INSTRUCTIONS
- No signs of bleeding or aneurysm on CT head, CTA head and neck  - Drink lots of fluids throughout the day, eat regular meals, get adequate sleep  - Do not take Fioricet while taking Ubrelvy or Percocet. Avoid Percocet for migraine headaches.   - Follow up with your primary doctor and with neurology to be reevaluated as soon as possible

## 2022-10-06 NOTE — ED PROVIDER NOTES
Chief Complaint   Patient presents with    Migraine     This is a 55-year-old male with a history of anxiety and depression, chronic pain, non-insulin-dependent diabetes, chronic migraine headaches, atrial fibrillation on Xarelto, history of gastric bypass surgery, presenting with persistent headache over the past 3 to 4 days. Localizes to the top of his head. Reports that the headache was gradual in onset, he cannot recall any precipitant for the headache in particular. Feels that it is more severe than previous migraines. Denies any associated fevers, neck pain, or vomiting. No loss of vision. Denies syncope or gait instability. No extremity weakness or sensory deficits. He's been ambulatory today, no antecedent head trauma. Took Percocet 10 mg this morning at 0300 without relief and tells me that he takes Percocet intermittently for headaches, has also been taking Emgality, Nurtec, and sumatriptan without relief. He is followed by neurology locally ([place]). His psychiatrist recently stopped his Wellbutrin and instructed him to start Abilify for depression and anxiety. Reports a family history of brain aneurysm but he has no history of intracranial aneurysm to his knowledge. No other systemic complaints, symptoms are severe in nature without alleviating factors. Review of Systems   Constitutional:  Negative for fever. HENT:  Negative for facial swelling. Eyes:  Negative for visual disturbance. Respiratory:  Negative for shortness of breath. Cardiovascular:  Negative for chest pain. Gastrointestinal:  Negative for abdominal pain and vomiting. Genitourinary:  Negative for difficulty urinating. Musculoskeletal:  Negative for neck pain. Neurological:  Positive for headaches. Negative for syncope, weakness and numbness. Psychiatric/Behavioral:  Negative for confusion.         Past Medical History:   Diagnosis Date    Anxiety     Arrhythmia     SINUS TACHY    Langford's esophagus 3/19/2012    CAD (coronary artery disease)     Carpal tunnel syndrome of right wrist     Cellulitis of right wrist 05/12/2020    Chronic pain     Depression     Diabetes mellitus ([facility] 3/19/2012    GERD (gastroesophageal reflux disease)     Headache     Hearing loss     History of MI (myocardial infarction) 3-00    History of peptic ulcer disease 3/19/2012    HTN (hypertension) 3/19/2012    Memory loss     Morbid obesity ([facility] 3/19/2012    2002-OPEN GASTRIC BYPASS    Other ill-defined conditions(799.89)     MIGRAINES    Psychiatric disorder     Reflux 3/19/2012    Sleep apnea, obstructive 3/19/2012    Unspecified adverse effect of anesthesia     HEADACHE    Unspecified sleep apnea     HX-PRIOR TO GASTRIC BYPASS       Past Surgical History:   Procedure Laterality Date    BIOPSY LIVER  9-3-02    HX APPENDECTOMY      HX CHOLECYSTECTOMY      HX GASTRIC BYPASS  9-3-02    HX HERNIA REPAIR  4.3.2012    Dr. Marty Alcocer - laparoscopic incisional hernia repair    HX ORTHOPAEDIC Right 05/12/2020    Suppurative tenosynovitis of flexor tendon of right hand     HX TONSILLECTOMY      NEUROLOGICAL PROCEDURE UNLISTED  2004    CERVICAL FUSION    MA APPENDECTOMY      MA LAP,VAGUS NERVE,TRUNCAL           Family History:   Problem Relation Age of Onset    Diabetes Mother     Hypertension Mother     Obesity Mother     Emphysema Mother     Heart Disease Mother     Cancer Father         prostate    Obesity Sister     Other Sister         aneurysm    Alcohol abuse Brother     Headache Brother     Other Brother         aneurysm       Social History     Socioeconomic History    Marital status:      Spouse name: Not on file    Number of children: Not on file    Years of education: Not on file    Highest education level: Not on file   Occupational History    Not on file   Tobacco Use    Smoking status: Every Day     Packs/day: 1.00     Types: Cigarettes    Smokeless tobacco: Never   Vaping Use    Vaping Use: Never used   Substance and Sexual Activity    Alcohol use: Yes     Comment: Rarely    Drug use: No    Sexual activity: Not on file   Other Topics Concern    Not on file   Social History Narrative    Not on file     Social Determinants of Health     Financial Resource Strain: Not on file   Food Insecurity: Not on file   Transportation Needs: Not on file   Physical Activity: Not on file   Stress: Not on file   Social Connections: Not on file   Intimate Partner Violence: Not on file   Housing Stability: Not on file         ALLERGIES: Augmentin [amoxicillin-pot clavulanate]      Vitals:    10/06/22 1102   BP: (!) 141/72   Pulse: 60   Resp: 16   Temp: 97.7 °F (36.5 °C)   SpO2: 99%   Weight: 72.6 kg (160 lb)   Height: 6' (1.829 m)       Physical exam  General:  Awake and alert, NAD  HEENT:  NC/AT, equal pupils, moist mucous membranes  Neck:   Normal inspection, full range of motion  Cardiac:  RRR, no murmurs  Respiratory:  Clear bilaterally, no wheezes, rales, rhonchi  Abdomen:  Soft and nontender, nondistended  Extremities: Warm and well perfused, no peripheral edema  Neuro:  CN grossly intact, moving all extremities symmetrically without gross motor deficit, no sensory deficits appreciated, ambulatory with normal gait  Skin:   No rashes, ecchymoses, or pallor    Recent Results (from the past 12 hour(s))   CBC WITH AUTOMATED DIFF    Collection Time: 10/06/22 11:34 AM   Result Value Ref Range    WBC 11.3 (H) 4.1 - 11.1 K/uL    RBC 3.90 (L) 4.10 - 5.70 M/uL    HGB 11.8 (L) 12.1 - 17.0 g/dL    HCT 37.1 36.6 - 50.3 %    MCV 95.1 80.0 - 99.0 FL    MCH 30.3 26.0 - 34.0 PG    MCHC 31.8 30.0 - 36.5 g/dL    RDW 14.1 11.5 - 14.5 %    PLATELET 515 117 - 620 K/uL    MPV 10.5 8.9 - 12.9 FL    NRBC 0.0 0  WBC    ABSOLUTE NRBC 0.00 0.00 - 0.01 K/uL    NEUTROPHILS 77 (H) 32 - 75 %    LYMPHOCYTES 15 12 - 49 %    MONOCYTES 6 5 - 13 %    EOSINOPHILS 2 0 - 7 %    BASOPHILS 0 0 - 1 %    IMMATURE GRANULOCYTES 0 0.0 - 0.5 %    ABS.  NEUTROPHILS 8.6 (H) 1.8 - 8.0 K/UL ABS. LYMPHOCYTES 1.7 0.8 - 3.5 K/UL    ABS. MONOCYTES 0.7 0.0 - 1.0 K/UL    ABS. EOSINOPHILS 0.2 0.0 - 0.4 K/UL    ABS. BASOPHILS 0.1 0.0 - 0.1 K/UL    ABS. IMM. GRANS. 0.1 (H) 0.00 - 0.04 K/UL    DF AUTOMATED     METABOLIC PANEL, BASIC    Collection Time: 10/06/22 11:34 AM   Result Value Ref Range    Sodium 139 136 - 145 mmol/L    Potassium 4.7 3.5 - 5.1 mmol/L    Chloride 106 97 - 108 mmol/L    CO2 28 21 - 32 mmol/L    Anion gap 5 5 - 15 mmol/L    Glucose 127 (H) 65 - 100 mg/dL    BUN 10 6 - 20 MG/DL    Creatinine 0.54 (L) 0.70 - 1.30 MG/DL    BUN/Creatinine ratio 19 12 - 20      eGFR >60 >60 ml/min/1.73m2    Calcium 8.5 8.5 - 10.1 MG/DL   MAGNESIUM    Collection Time: 10/06/22 11:34 AM   Result Value Ref Range    Magnesium 2.0 1.6 - 2.4 mg/dL      CT HEAD WO CONT    Result Date: 10/6/2022  No evidence of acute process. CTA HEAD NECK W CONT    Result Date: 10/6/2022  1. No acute process. No large vessel occlusion, arterial dissection, or flow-limiting stenosis. 2. CT perfusion not performed. If high clinical concern for acute infarction consider MRI. 3. Emphysema and partly visualized prominent mediastinal/hilar lymph nodes. If clinically indicated consider dedicated CT chest.   Procedures - none unless documented below      ED course: Labs and imaging reviewed. CT head and CTA head/neck unremarkable. Neurologic exam is nonfocal.  After receiving IV fluids, compazine/Benadryl, Decadron, he's feeling much better. Headache has improved significantly. Given hx and exam, reassessment, and diagnostics, low suspicion at this moment in time for [facility], meningitis/encephalitis, cervical dissection, or other emergent intracranial pathology.   Has a pattern of chronic migraines with recurrence today, we discussed how using Percocet may be contributing to rebound headaches and to avoid if possible and follow up with his neurologist as soon as possible to be reevaluated, and for additional recommendations on how to best treat going forward. He feels comfortable going home and with the plan of care, is feeling much better, will discharge.     Impression: Acute headache  Disposition: Discharge home

## 2022-10-06 NOTE — ED TRIAGE NOTES
Pt complains of worst headache he's ever had. Pt hx of migraines but doesn't feel that ways. Balance is off. Headache started 3-4 days ago.

## 2022-10-06 NOTE — TELEPHONE ENCOUNTER
UBRELVY PA initiated through Cover my meds key # -  [id number] - PA Case ID: [id number]  Approvedtoday  CaseId:[id number];Status:Approved; Review Type:Prior Auth; Coverage Start Date:10/06/2022; Coverage End Date:10/06/2023      S/w pt's wife, pt was seen at [facility] and had CT/CTA done which did not show anything acute. She states Leopoldo  was written at [address] [place] [address] on 9/1/22 but they have still yet to be able to get it from pharmacy. Will resend to pharmacy with PA information.

## 2022-10-06 NOTE — TELEPHONE ENCOUNTER
Pt wife would like a call back, they are wondering what hospital would be best to go to. Pt has had \" the worst headache of his life\" for the past three days. Please call w/ guidance.

## 2022-10-27 RX ORDER — CEFUROXIME AXETIL 250 MG/1
TABLET ORAL
Qty: 4 KIT | Refills: 15 | Status: SHIPPED | OUTPATIENT
Start: 2022-10-27

## 2022-12-30 RX ORDER — ONDANSETRON 4 MG/1
TABLET, ORALLY DISINTEGRATING ORAL
Qty: 20 TABLET | Refills: 5 | Status: SHIPPED | OUTPATIENT
Start: 2022-12-30

## 2023-02-18 DIAGNOSIS — G43.709 CHRONIC MIGRAINE WITHOUT AURA WITHOUT STATUS MIGRAINOSUS, NOT INTRACTABLE: ICD-10-CM

## 2023-02-18 RX ORDER — GALCANEZUMAB 120 MG/ML
INJECTION, SOLUTION SUBCUTANEOUS
Qty: 1 ML | Refills: 5 | Status: SHIPPED | OUTPATIENT
Start: 2023-02-18

## 2023-08-31 DIAGNOSIS — G43.011 MIGRAINE WITHOUT AURA, INTRACTABLE, WITH STATUS MIGRAINOSUS: Primary | ICD-10-CM

## 2023-09-01 RX ORDER — ONDANSETRON 4 MG/1
4 TABLET, ORALLY DISINTEGRATING ORAL EVERY 8 HOURS PRN
Qty: 20 TABLET | Refills: 0 | Status: SHIPPED | OUTPATIENT
Start: 2023-09-01

## 2023-09-25 ENCOUNTER — TELEPHONE (OUTPATIENT)
Age: 63
End: 2023-09-25

## 2023-09-25 DIAGNOSIS — G43.011 MIGRAINE WITHOUT AURA, INTRACTABLE, WITH STATUS MIGRAINOSUS: Primary | ICD-10-CM

## 2023-09-27 ENCOUNTER — TELEPHONE (OUTPATIENT)
Age: 63
End: 2023-09-27

## 2023-09-27 RX ORDER — GALCANEZUMAB 120 MG/ML
INJECTION, SOLUTION SUBCUTANEOUS
Qty: 1 ML | Refills: 0 | Status: SHIPPED | OUTPATIENT
Start: 2023-09-27

## 2023-09-27 NOTE — TELEPHONE ENCOUNTER
"Patient seen and examined at bedside.  The history and physical not changed as below.    We will be performing a diagnostic left heart catheterization with possible intervention based on findings.    Risk, benefits and alternatives were explained to the patient and she wished to proceed.    Encounter Date:10/05/2022        Subjective     Patient ID: Tessa Oseguera is a 54 y.o. female.     Chief Complaint:  History of Present Illness     The patient is a 54-year-old female who presents today for follow up visit. She is accompanied by a male adult to this visit.      She states her anxiety has been causing her blood pressure to increase. She has been experiencing side effects from cabergoline that makes her head feel like it is \"swimming.\" Yesterday, when she received a call from the office stating she needed to schedule a heart catheterization her blood pressure went up. Her pulse is in the 50s BPM, she states at her ER visits her heart rate was in the 40s BPM.      When she checks her pulse she states the fifth pulse will be skipped, but it does not last long. She states she does not experience palpitations daily; she will go 3 days without experiencing palpitations. She states if she gets startled, she will experience palpitations.      Recently, she states she experienced a \"twinge\" in her chest. She states this symptom does not occur with her palpitations.      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 She had a CT scan of her carotids obtained to ensure her carotids were getting adequate blood flow. She has been told cabergoline has long term effects than can affect her heart and lungs. She states when she stands up she would feel lightheaded. She has cut her blood pressure medication lisinopril in half since experiencing lowered blood pressure.      She has been experiencing lower extremity edema. She will experience increased levels of edema in the summertime. " Returned call, there are no samples in stock. Urgent request sent to [facility] for PA. Advised Mrs. Egan to write on calendar that PA is due for [facility] end of Sept and contact office beginning of Sept to have initiated.            Review of Systems   Constitutional: Negative for diaphoresis, fever and malaise/fatigue.   HENT: Negative for congestion.    Eyes: Negative for vision loss in left eye and vision loss in right eye.   Cardiovascular: Positive for irregular heartbeat and palpitations. Negative for chest pain, claudication, dyspnea on exertion, leg swelling, orthopnea and syncope.   Respiratory: Negative for cough, shortness of breath and wheezing.    Hematologic/Lymphatic: Negative for adenopathy.   Skin: Negative for rash.   Musculoskeletal: Negative for joint pain and joint swelling.   Gastrointestinal: Negative for abdominal pain, diarrhea, nausea and vomiting.   Neurological: Positive for light-headedness. Negative for excessive daytime sleepiness, dizziness, focal weakness, numbness and weakness.   Psychiatric/Behavioral: Negative for depression. The patient does not have insomnia.    All other systems reviewed and are negative.              Current Outpatient Medications:   •  atorvastatin (LIPITOR) 20 MG tablet, Take 1 tablet by mouth Daily., Disp: , Rfl:   •  busPIRone (BUSPAR) 5 MG tablet, , Disp: , Rfl:   •  cabergoline (DOSTINEX) 0.5 MG tablet, Take 0.5 mg 1 tab 3 days a week, and 1/2 tab 4 days a week., Disp: , Rfl:   •  Cholecalciferol (VITAMIN D) 2000 units capsule, Take  by mouth Daily., Disp: , Rfl:   •  Cholecalciferol 50 MCG (2000 UT) capsule, Take 2,000 Units by mouth., Disp: , Rfl:   •  lisinopril (PRINIVIL,ZESTRIL) 5 MG tablet, Take 1 tablet by mouth Daily., Disp: 90 tablet, Rfl: 3           Objective:      Objective          Vitals:     10/05/22 0903   BP: 130/84   Pulse: 55   SpO2: 99%      Vitals and nursing note reviewed.   Constitutional:       Appearance: Normal and healthy appearance. Well-developed and not in distress.   Eyes:      Extraocular Movements: Extraocular movements intact.      Pupils: Pupils are equal, round, and reactive to light.   HENT:      Head: Normocephalic and atraumatic.     Mouth/Throat:      Pharynx: Oropharynx is clear.   Neck:      Vascular: JVD normal.      Trachea: Trachea normal.   Pulmonary:      Effort: Pulmonary effort is normal.      Breath sounds: Normal breath sounds. No wheezing. No rhonchi. No rales.   Cardiovascular:      PMI at left midclavicular line. Normal rate. Regular rhythm. Normal S1. Normal S2.      Murmurs: There is a grade 2/6 systolic murmur.      No gallop. No rub.   Pulses:     Dorsalis pedis: 2+ bilaterally.     Posterior tibial: 2+ bilaterally.  Abdominal:      General: Bowel sounds are normal.      Palpations: Abdomen is soft.      Tenderness: There is no abdominal tenderness.   Musculoskeletal: Normal range of motion.      Cervical back: Normal range of motion and neck supple. Skin:     General: Skin is warm and dry.      Capillary Refill: Capillary refill takes less than 2 seconds.   Feet:      Right foot:      Skin integrity: Skin integrity normal.      Left foot:      Skin integrity: Skin integrity normal.   Neurological:      Mental Status: Alert and oriented to person, place and time.      Cranial Nerves: Cranial nerves are intact.      Sensory: Sensation is intact.      Motor: Motor function is intact.      Coordination: Coordination is intact.   Psychiatric:         Speech: Speech normal.         Behavior: Behavior is cooperative.            Lab Review:      The patient's heart monitor results reveal 1 episode of supraventricular tachycardia with aberrancy and 1 other episode of supraventricular tachycardia. Frequent PVCs. Echocardiogram of the heart revealed normal systolic function, normal diastolic function and all other chambers were normal. Nuclear stress test results reveal perfusion defect in the LAD. Her results were moderate to high risk.         Procedures        Assessment/Plan:           Problem List Items Addressed This Visit                 Cardiac and Vasculature      Primary hypertension      Relevant Medications      lisinopril  (PRINIVIL,ZESTRIL) 5 MG tablet      Palpitations      Mixed hyperlipidemia      Abnormal nuclear stress test - Primary      Relevant Orders      Case Request Cath Lab: Left Heart Cath w possible intervention, right radial, US guided (Completed)            Symptoms and Signs      Chest pain, atypical           Test results of the patient's heart monitor, echocardiogram, and nuclear stress test were discussed at length during this visit. The patient's questions were answered during this visit. The patient needs a left heart catheterization, risks and benefits of this were discussed at length during this visit. Left heart catheterization was scheduled for 10/17/2022.  The patient was prescribed lisinopril 5 MG since her blood pressure is normal with her lowered dose she was taking. The patient was advised that at her next visit after heart catheterization starting a beta blocker for her palpitations will be discussed.

## 2023-09-27 NOTE — TELEPHONE ENCOUNTER
Re: Emgality    Created case in [facility] key# [id number], rcvd message Carli ENCINAS is already on file with expiration date of 12/31/2099\"    Sent messge to nurse for possible new script.

## 2023-09-27 NOTE — TELEPHONE ENCOUNTER
Patient's wife is calling in to see if any samples for [facility] are available for patient to . Please contact.

## 2023-09-29 ENCOUNTER — OFFICE VISIT (OUTPATIENT)
Age: 63
End: 2023-09-29
Payer: MEDICARE

## 2023-09-29 VITALS
WEIGHT: 170 LBS | HEART RATE: 85 BPM | BODY MASS INDEX: 23.06 KG/M2 | OXYGEN SATURATION: 95 % | DIASTOLIC BLOOD PRESSURE: 78 MMHG | SYSTOLIC BLOOD PRESSURE: 130 MMHG

## 2023-09-29 DIAGNOSIS — G47.00 INSOMNIA, UNSPECIFIED TYPE: Primary | ICD-10-CM

## 2023-09-29 DIAGNOSIS — G43.011 MIGRAINE WITHOUT AURA, INTRACTABLE, WITH STATUS MIGRAINOSUS: ICD-10-CM

## 2023-09-29 PROCEDURE — 99214 OFFICE O/P EST MOD 30 MIN: CPT

## 2023-09-29 PROCEDURE — 4004F PT TOBACCO SCREEN RCVD TLK: CPT

## 2023-09-29 PROCEDURE — 3017F COLORECTAL CA SCREEN DOC REV: CPT

## 2023-09-29 PROCEDURE — G8427 DOCREV CUR MEDS BY ELIG CLIN: HCPCS

## 2023-09-29 PROCEDURE — G8420 CALC BMI NORM PARAMETERS: HCPCS

## 2023-09-29 PROCEDURE — 3078F DIAST BP <80 MM HG: CPT

## 2023-09-29 PROCEDURE — 3075F SYST BP GE 130 - 139MM HG: CPT

## 2023-09-29 RX ORDER — SUMATRIPTAN SUCCINATE 6 MG/.5ML
INJECTION, SOLUTION SUBCUTANEOUS
Qty: 15 ML | Refills: 11 | Status: SHIPPED | OUTPATIENT
Start: 2023-09-29

## 2023-09-29 ASSESSMENT — ENCOUNTER SYMPTOMS
PHOTOPHOBIA: 1
NAUSEA: 1

## 2023-09-29 NOTE — PROGRESS NOTES
MHA were doing well, missed 2 doses  Said while on med was having about 10 monthly  When off was having about 15+ monthly  Jannie Memory works well, triptan works well, then oxy if all else fails
Clavulanate Nausea Only       Current Outpatient Medications   Medication Sig Dispense Refill    Ubrogepant 100 MG TABS 1 at HA onset and repeat in 2 hours x 1 prn  Max 2 tabs/24 hours 16 tablet 11    SUMAtriptan Succinate Refill 6 MG/0.5ML SOCT INJECT 0.5 ML ONCE AS NEEDED FOR MIGRAINE UP TO 1 DOSE *INS LIMITS 4ML/30 DAYS* 15 mL 11    Galcanezumab-gnlm (EMGALITY) 120 MG/ML SOAJ INJECT 1 ML BY SUBCUTANEOUS ROUTE EVERY TWENTY-EIGHT (28) DAYS 1 mL 0    ondansetron (ZOFRAN-ODT) 4 MG disintegrating tablet Take 1 tablet by mouth every 8 hours as needed for Nausea or Vomiting THIS TIME ONLY, APPT REQUIRED 20 tablet 0    amoxicillin-clavulanate (AUGMENTIN) 875-125 MG per tablet ceived the following from [facility]: Outside name: amoxicillin-clavulanate (AUGMENTIN) 875-125 mg per tablet      Ascorbic Acid 250 MG CHEW Take by mouth      butalbital-APAP-caffeine -40 MG CAPS per capsule Take 1 capsule by mouth every 4 hours as needed      cyanocobalamin 500 MCG tablet Take 1 tablet by mouth daily      diclofenac sodium (VOLTAREN) 1 % GEL Apply 2 g topically 4 times daily as needed      DULoxetine (CYMBALTA) 30 MG extended release capsule TAKE 1 ORAL CAPSULE ONCE A DAYTAKE WITH DULOXETINE 60MG FOR TOTAL DAILY DOSE OF 90MG      DULoxetine (CYMBALTA) 60 MG extended release capsule Take 1 capsule by mouth daily      lamoTRIgine (LAMICTAL) 100 MG tablet Take 2 tablets by mouth 2 times daily      lansoprazole (PREVACID) 30 MG delayed release capsule Take by mouth 2 times daily      metoclopramide (REGLAN) 10 MG tablet Take 1 tablet by mouth 2 times daily      metoprolol succinate (TOPROL XL) 25 MG extended release tablet Take 1 tablet by mouth daily      oxyCODONE-acetaminophen (PERCOCET)  MG per tablet Take 1 tablet by mouth every 8 hours as needed.       rivaroxaban (XARELTO) 20 MG TABS tablet ceived the following from [facility]: Outside name: Xarelto 20 mg tab tablet      rosuvastatin

## 2023-10-02 DIAGNOSIS — G43.011 MIGRAINE WITHOUT AURA, INTRACTABLE, WITH STATUS MIGRAINOSUS: ICD-10-CM

## 2023-10-03 RX ORDER — ONDANSETRON 4 MG/1
4 TABLET, ORALLY DISINTEGRATING ORAL EVERY 8 HOURS PRN
Qty: 20 TABLET | Refills: 1 | Status: SHIPPED | OUTPATIENT
Start: 2023-10-03

## 2023-11-01 DIAGNOSIS — G43.011 MIGRAINE WITHOUT AURA, INTRACTABLE, WITH STATUS MIGRAINOSUS: ICD-10-CM

## 2023-11-01 RX ORDER — GALCANEZUMAB 120 MG/ML
INJECTION, SOLUTION SUBCUTANEOUS
Qty: 120 ML | Refills: 11 | Status: SHIPPED | OUTPATIENT
Start: 2023-11-01

## 2023-11-16 DIAGNOSIS — G43.011 MIGRAINE WITHOUT AURA, INTRACTABLE, WITH STATUS MIGRAINOSUS: ICD-10-CM

## 2023-11-17 RX ORDER — ONDANSETRON 4 MG/1
4 TABLET, ORALLY DISINTEGRATING ORAL EVERY 8 HOURS PRN
Qty: 20 TABLET | Refills: 1 | OUTPATIENT
Start: 2023-11-17

## 2023-12-28 DIAGNOSIS — G43.011 MIGRAINE WITHOUT AURA, INTRACTABLE, WITH STATUS MIGRAINOSUS: ICD-10-CM

## 2023-12-29 DIAGNOSIS — G43.011 MIGRAINE WITHOUT AURA, INTRACTABLE, WITH STATUS MIGRAINOSUS: ICD-10-CM

## 2023-12-29 RX ORDER — ONDANSETRON 4 MG/1
4 TABLET, ORALLY DISINTEGRATING ORAL EVERY 8 HOURS PRN
Qty: 20 TABLET | Refills: 1 | OUTPATIENT
Start: 2023-12-29

## 2023-12-29 RX ORDER — ONDANSETRON 4 MG/1
4 TABLET, ORALLY DISINTEGRATING ORAL EVERY 8 HOURS PRN
Qty: 20 TABLET | Refills: 1 | Status: SHIPPED | OUTPATIENT
Start: 2023-12-29 | End: 2023-12-29 | Stop reason: SDUPTHER

## 2023-12-29 RX ORDER — ONDANSETRON 4 MG/1
4 TABLET, ORALLY DISINTEGRATING ORAL EVERY 8 HOURS PRN
Qty: 20 TABLET | Refills: 1 | Status: SHIPPED | OUTPATIENT
Start: 2023-12-29

## 2024-02-29 ENCOUNTER — HOSPITAL ENCOUNTER (EMERGENCY)
Facility: HOSPITAL | Age: 64
Discharge: HOME | End: 2024-02-29
Attending: STUDENT IN AN ORGANIZED HEALTH CARE EDUCATION/TRAINING PROGRAM
Payer: MEDICARE

## 2024-02-29 ENCOUNTER — APPOINTMENT (OUTPATIENT)
Facility: HOSPITAL | Age: 64
End: 2024-02-29
Payer: MEDICARE

## 2024-02-29 VITALS
DIASTOLIC BLOOD PRESSURE: 61 MMHG | OXYGEN SATURATION: 97 % | HEART RATE: 51 BPM | WEIGHT: 181 LBS | TEMPERATURE: 97.5 F | BODY MASS INDEX: 24.52 KG/M2 | SYSTOLIC BLOOD PRESSURE: 127 MMHG | RESPIRATION RATE: 15 BRPM | HEIGHT: 72 IN

## 2024-02-29 DIAGNOSIS — F17.200 SMOKER: ICD-10-CM

## 2024-02-29 DIAGNOSIS — R07.9 CHEST PAIN, UNSPECIFIED TYPE: ICD-10-CM

## 2024-02-29 DIAGNOSIS — R51.9 INTRACTABLE EPISODIC HEADACHE, UNSPECIFIED HEADACHE TYPE: Primary | ICD-10-CM

## 2024-02-29 LAB
ALBUMIN SERPL-MCNC: 3.7 G/DL (ref 3.5–5)
ALBUMIN/GLOB SERPL: 0.9 (ref 1.1–2.2)
ALP SERPL-CCNC: 232 U/L (ref 45–117)
ALT SERPL-CCNC: 52 U/L (ref 12–78)
ANION GAP SERPL CALC-SCNC: 3 MMOL/L (ref 5–15)
AST SERPL-CCNC: 21 U/L (ref 15–37)
BASOPHILS # BLD: 0.1 K/UL (ref 0–0.1)
BASOPHILS NFR BLD: 1 % (ref 0–1)
BILIRUB SERPL-MCNC: 0.4 MG/DL (ref 0.2–1)
BUN SERPL-MCNC: 13 MG/DL (ref 6–20)
BUN/CREAT SERPL: 16 (ref 12–20)
CALCIUM SERPL-MCNC: 9.4 MG/DL (ref 8.5–10.1)
CHLORIDE SERPL-SCNC: 102 MMOL/L (ref 97–108)
CO2 SERPL-SCNC: 31 MMOL/L (ref 21–32)
COMMENT:: NORMAL
CREAT SERPL-MCNC: 0.82 MG/DL (ref 0.7–1.3)
DIFFERENTIAL METHOD BLD: ABNORMAL
EKG ATRIAL RATE: 51 BPM
EKG DIAGNOSIS: NORMAL
EKG P AXIS: 86 DEGREES
EKG P-R INTERVAL: 130 MS
EKG Q-T INTERVAL: 414 MS
EKG QRS DURATION: 78 MS
EKG QTC CALCULATION (BAZETT): 381 MS
EKG R AXIS: 66 DEGREES
EKG T AXIS: 82 DEGREES
EKG VENTRICULAR RATE: 51 BPM
EOSINOPHIL # BLD: 0.2 K/UL (ref 0–0.4)
EOSINOPHIL NFR BLD: 2 % (ref 0–7)
ERYTHROCYTE [DISTWIDTH] IN BLOOD BY AUTOMATED COUNT: 15 % (ref 11.5–14.5)
GLOBULIN SER CALC-MCNC: 4.3 G/DL (ref 2–4)
GLUCOSE SERPL-MCNC: 158 MG/DL (ref 65–100)
HCT VFR BLD AUTO: 44.6 % (ref 36.6–50.3)
HGB BLD-MCNC: 14.3 G/DL (ref 12.1–17)
IMM GRANULOCYTES # BLD AUTO: 0.1 K/UL (ref 0–0.04)
IMM GRANULOCYTES NFR BLD AUTO: 1 % (ref 0–0.5)
LYMPHOCYTES # BLD: 1.6 K/UL (ref 0.8–3.5)
LYMPHOCYTES NFR BLD: 15 % (ref 12–49)
MCH RBC QN AUTO: 28.1 PG (ref 26–34)
MCHC RBC AUTO-ENTMCNC: 32.1 G/DL (ref 30–36.5)
MCV RBC AUTO: 87.8 FL (ref 80–99)
MONOCYTES # BLD: 0.6 K/UL (ref 0–1)
MONOCYTES NFR BLD: 6 % (ref 5–13)
NEUTS SEG # BLD: 8 K/UL (ref 1.8–8)
NEUTS SEG NFR BLD: 75 % (ref 32–75)
NRBC # BLD: 0 K/UL (ref 0–0.01)
NRBC BLD-RTO: 0 PER 100 WBC
PLATELET # BLD AUTO: 305 K/UL (ref 150–400)
PMV BLD AUTO: 10.1 FL (ref 8.9–12.9)
POTASSIUM SERPL-SCNC: 4.2 MMOL/L (ref 3.5–5.1)
PROT SERPL-MCNC: 8 G/DL (ref 6.4–8.2)
RBC # BLD AUTO: 5.08 M/UL (ref 4.1–5.7)
SODIUM SERPL-SCNC: 136 MMOL/L (ref 136–145)
SPECIMEN HOLD: NORMAL
TROPONIN I SERPL HS-MCNC: 6 NG/L (ref 0–76)
WBC # BLD AUTO: 10.5 K/UL (ref 4.1–11.1)

## 2024-02-29 PROCEDURE — 80053 COMPREHEN METABOLIC PANEL: CPT

## 2024-02-29 PROCEDURE — 2580000003 HC RX 258

## 2024-02-29 PROCEDURE — A4216 STERILE WATER/SALINE, 10 ML: HCPCS

## 2024-02-29 PROCEDURE — 84484 ASSAY OF TROPONIN QUANT: CPT

## 2024-02-29 PROCEDURE — 6360000002 HC RX W HCPCS

## 2024-02-29 PROCEDURE — 93005 ELECTROCARDIOGRAM TRACING: CPT | Performed by: STUDENT IN AN ORGANIZED HEALTH CARE EDUCATION/TRAINING PROGRAM

## 2024-02-29 PROCEDURE — 96374 THER/PROPH/DIAG INJ IV PUSH: CPT

## 2024-02-29 PROCEDURE — 85025 COMPLETE CBC W/AUTO DIFF WBC: CPT

## 2024-02-29 PROCEDURE — 99285 EMERGENCY DEPT VISIT HI MDM: CPT

## 2024-02-29 PROCEDURE — 6370000000 HC RX 637 (ALT 250 FOR IP)

## 2024-02-29 PROCEDURE — 71046 X-RAY EXAM CHEST 2 VIEWS: CPT

## 2024-02-29 PROCEDURE — 93010 ELECTROCARDIOGRAM REPORT: CPT | Performed by: SPECIALIST

## 2024-02-29 PROCEDURE — 96361 HYDRATE IV INFUSION ADD-ON: CPT

## 2024-02-29 PROCEDURE — 96375 TX/PRO/DX INJ NEW DRUG ADDON: CPT

## 2024-02-29 PROCEDURE — 36415 COLL VENOUS BLD VENIPUNCTURE: CPT

## 2024-02-29 PROCEDURE — 2500000003 HC RX 250 WO HCPCS

## 2024-02-29 RX ORDER — PROCHLORPERAZINE EDISYLATE 5 MG/ML
10 INJECTION INTRAMUSCULAR; INTRAVENOUS EVERY 6 HOURS PRN
Status: DISCONTINUED | OUTPATIENT
Start: 2024-02-29 | End: 2024-02-29 | Stop reason: HOSPADM

## 2024-02-29 RX ORDER — DIPHENHYDRAMINE HYDROCHLORIDE 50 MG/ML
25 INJECTION INTRAMUSCULAR; INTRAVENOUS
Status: COMPLETED | OUTPATIENT
Start: 2024-02-29 | End: 2024-02-29

## 2024-02-29 RX ORDER — DEXAMETHASONE SODIUM PHOSPHATE 10 MG/ML
10 INJECTION, SOLUTION INTRAMUSCULAR; INTRAVENOUS
Status: COMPLETED | OUTPATIENT
Start: 2024-02-29 | End: 2024-02-29

## 2024-02-29 RX ORDER — 0.9 % SODIUM CHLORIDE 0.9 %
1000 INTRAVENOUS SOLUTION INTRAVENOUS ONCE
Status: COMPLETED | OUTPATIENT
Start: 2024-02-29 | End: 2024-02-29

## 2024-02-29 RX ORDER — BUTALBITAL, ASPIRIN, AND CAFFEINE 325; 50; 40 MG/1; MG/1; MG/1
1 CAPSULE ORAL EVERY 4 HOURS PRN
Qty: 30 CAPSULE | Refills: 0 | Status: SHIPPED | OUTPATIENT
Start: 2024-02-29 | End: 2024-03-05

## 2024-02-29 RX ORDER — FAMOTIDINE 20 MG/1
20 TABLET, FILM COATED ORAL 2 TIMES DAILY
Qty: 60 TABLET | Refills: 3 | Status: SHIPPED | OUTPATIENT
Start: 2024-02-29

## 2024-02-29 RX ORDER — KETOROLAC TROMETHAMINE 30 MG/ML
30 INJECTION, SOLUTION INTRAMUSCULAR; INTRAVENOUS
Status: COMPLETED | OUTPATIENT
Start: 2024-02-29 | End: 2024-02-29

## 2024-02-29 RX ORDER — LIDOCAINE HYDROCHLORIDE 20 MG/ML
15 SOLUTION OROPHARYNGEAL
Status: COMPLETED | OUTPATIENT
Start: 2024-02-29 | End: 2024-02-29

## 2024-02-29 RX ADMIN — SODIUM CHLORIDE 1000 ML: 9 INJECTION, SOLUTION INTRAVENOUS at 11:56

## 2024-02-29 RX ADMIN — DIPHENHYDRAMINE HYDROCHLORIDE 25 MG: 50 INJECTION INTRAMUSCULAR; INTRAVENOUS at 12:03

## 2024-02-29 RX ADMIN — KETOROLAC TROMETHAMINE 30 MG: 30 INJECTION, SOLUTION INTRAMUSCULAR; INTRAVENOUS at 13:03

## 2024-02-29 RX ADMIN — LIDOCAINE HYDROCHLORIDE 15 ML: 20 SOLUTION ORAL; TOPICAL at 11:58

## 2024-02-29 RX ADMIN — FAMOTIDINE 20 MG: 10 INJECTION, SOLUTION INTRAVENOUS at 11:59

## 2024-02-29 RX ADMIN — ALUMINUM HYDROXIDE AND MAGNESIUM HYDROXIDE 30 ML: 200; 200 SUSPENSION ORAL at 11:58

## 2024-02-29 RX ADMIN — DEXAMETHASONE SODIUM PHOSPHATE 10 MG: 10 INJECTION, SOLUTION INTRAMUSCULAR; INTRAVENOUS at 12:02

## 2024-02-29 RX ADMIN — PROCHLORPERAZINE EDISYLATE 10 MG: 5 INJECTION INTRAMUSCULAR; INTRAVENOUS at 12:07

## 2024-02-29 ASSESSMENT — HEART SCORE: ECG: 0

## 2024-02-29 ASSESSMENT — PAIN DESCRIPTION - ORIENTATION
ORIENTATION: ANTERIOR
ORIENTATION: ANTERIOR
ORIENTATION: ANTERIOR;MID

## 2024-02-29 ASSESSMENT — PAIN DESCRIPTION - LOCATION
LOCATION: HEAD;CHEST
LOCATION: HEAD
LOCATION: HEAD

## 2024-02-29 ASSESSMENT — PAIN DESCRIPTION - DESCRIPTORS
DESCRIPTORS: PRESSURE
DESCRIPTORS: TIGHTNESS;PRESSURE
DESCRIPTORS: PRESSURE

## 2024-02-29 ASSESSMENT — PAIN SCALES - GENERAL
PAINLEVEL_OUTOF10: 8
PAINLEVEL_OUTOF10: 8
PAINLEVEL_OUTOF10: 7

## 2024-02-29 ASSESSMENT — PAIN - FUNCTIONAL ASSESSMENT: PAIN_FUNCTIONAL_ASSESSMENT: 0-10

## 2024-02-29 NOTE — DISCHARGE INSTRUCTIONS
MCV 87.8 80.0 - 99.0 FL    MCH 28.1 26.0 - 34.0 PG    MCHC 32.1 30.0 - 36.5 g/dL    RDW 15.0 (H) 11.5 - 14.5 %    Platelets 305 150 - 400 K/uL    MPV 10.1 8.9 - 12.9 FL    Nucleated RBCs 0.0 0  WBC    nRBC 0.00 0.00 - 0.01 K/uL    Neutrophils % 75 32 - 75 %    Lymphocytes % 15 12 - 49 %    Monocytes % 6 5 - 13 %    Eosinophils % 2 0 - 7 %    Basophils % 1 0 - 1 %    Immature Granulocytes 1 (H) 0.0 - 0.5 %    Neutrophils Absolute 8.0 1.8 - 8.0 K/UL    Lymphocytes Absolute 1.6 0.8 - 3.5 K/UL    Monocytes Absolute 0.6 0.0 - 1.0 K/UL    Eosinophils Absolute 0.2 0.0 - 0.4 K/UL    Basophils Absolute 0.1 0.0 - 0.1 K/UL    Absolute Immature Granulocyte 0.1 (H) 0.00 - 0.04 K/UL    Differential Type AUTOMATED     Comprehensive Metabolic Panel    Collection Time: 02/29/24  9:51 AM   Result Value Ref Range    Sodium 136 136 - 145 mmol/L    Potassium 4.2 3.5 - 5.1 mmol/L    Chloride 102 97 - 108 mmol/L    CO2 31 21 - 32 mmol/L    Anion Gap 3 (L) 5 - 15 mmol/L    Glucose 158 (H) 65 - 100 mg/dL    BUN 13 6 - 20 MG/DL    Creatinine 0.82 0.70 - 1.30 MG/DL    Bun/Cre Ratio 16 12 - 20      Est, Glom Filt Rate >60 >60 ml/min/1.73m2    Calcium 9.4 8.5 - 10.1 MG/DL    Total Bilirubin 0.4 0.2 - 1.0 MG/DL    ALT 52 12 - 78 U/L    AST 21 15 - 37 U/L    Alk Phosphatase 232 (H) 45 - 117 U/L    Total Protein 8.0 6.4 - 8.2 g/dL    Albumin 3.7 3.5 - 5.0 g/dL    Globulin 4.3 (H) 2.0 - 4.0 g/dL    Albumin/Globulin Ratio 0.9 (L) 1.1 - 2.2     Troponin    Collection Time: 02/29/24  9:51 AM   Result Value Ref Range    Troponin, High Sensitivity 6 0 - 76 ng/L   Extra Tubes Hold    Collection Time: 02/29/24  9:51 AM   Result Value Ref Range    Specimen HOld [email or web address]     Comment:        Add-on orders for these samples will be processed based on acceptable specimen integrity and analyte stability, which may vary by analyte.       Radiologic Studies  XR CHEST (2 VW)   Final Result   1. No acute cardiopulmonary disease

## 2024-02-29 NOTE — ED TRIAGE NOTES
Ambulatory with complaints of \"migraine\" and mid chest pain x5 days.     Reports history of migraine and has taken prescribed medications without relief. Also had one cardiac stent placed in November of 2023.     Denies SOB, dizziness, blurred vision, numbness or tingling.

## 2024-02-29 NOTE — ED PROVIDER NOTES
understanding conveyed, and agreed upon. The patient is to follow up as recommended or return to ER should their symptoms worsen.     Prescription Drug Management Considerations: Nicotine patches offered - he declined.    PATIENT REFERRED TO:  [facility] EMERGENCY DEPT  [address] [facility] [address] [phone]    If symptoms worsen    Ambika Maguire MD  [address] [facility] [address] [phone]    Call in 1 day        DISCHARGE MEDICATIONS:  New Prescriptions    BUTALBITAL-ASPIRIN-CAFFEINE (FIORINAL) -40 MG CAPSULE    Take 1 capsule by mouth every 4 hours as needed for Headaches for up to 5 days. Max Daily Amount: 6 capsules    FAMOTIDINE (PEPCID) 20 MG TABLET    Take 1 tablet by mouth 2 times daily         (Please note that parts of this dictation were completed with voice recognition software. Quite often unanticipated grammatical, syntax, homophones, and other interpretive errors are inadvertently transcribed by the computer software. Efforts were made to edit the dictation but occasionally words remain mis-transcribed.)    EVAN Hagan NP (electronically signed)  Emergency Attending Physician / Physician Assistant / Nurse Practitioner     Hope Shook APRN - NP  02/29/24 7420

## 2024-03-21 ENCOUNTER — HOSPITAL ENCOUNTER (EMERGENCY)
Facility: HOSPITAL | Age: 64
Discharge: HOME | End: 2024-03-21
Attending: EMERGENCY MEDICINE
Payer: MEDICARE

## 2024-03-21 VITALS
HEART RATE: 53 BPM | DIASTOLIC BLOOD PRESSURE: 64 MMHG | RESPIRATION RATE: 14 BRPM | OXYGEN SATURATION: 100 % | WEIGHT: 180 LBS | HEIGHT: 72 IN | BODY MASS INDEX: 24.38 KG/M2 | SYSTOLIC BLOOD PRESSURE: 140 MMHG | TEMPERATURE: 97.9 F

## 2024-03-21 DIAGNOSIS — G43.911 INTRACTABLE MIGRAINE WITH STATUS MIGRAINOSUS, UNSPECIFIED MIGRAINE TYPE: Primary | ICD-10-CM

## 2024-03-21 LAB
COMMENT:: NORMAL
EKG ATRIAL RATE: 45 BPM
EKG DIAGNOSIS: NORMAL
EKG P-R INTERVAL: 122 MS
EKG Q-T INTERVAL: 440 MS
EKG QRS DURATION: 70 MS
EKG QTC CALCULATION (BAZETT): 380 MS
EKG R AXIS: 47 DEGREES
EKG T AXIS: 52 DEGREES
EKG VENTRICULAR RATE: 45 BPM
SPECIMEN HOLD: NORMAL

## 2024-03-21 PROCEDURE — 99284 EMERGENCY DEPT VISIT MOD MDM: CPT

## 2024-03-21 PROCEDURE — 96375 TX/PRO/DX INJ NEW DRUG ADDON: CPT

## 2024-03-21 PROCEDURE — 93010 ELECTROCARDIOGRAM REPORT: CPT | Performed by: SPECIALIST

## 2024-03-21 PROCEDURE — 36415 COLL VENOUS BLD VENIPUNCTURE: CPT

## 2024-03-21 PROCEDURE — 6360000002 HC RX W HCPCS: Performed by: EMERGENCY MEDICINE

## 2024-03-21 PROCEDURE — 93005 ELECTROCARDIOGRAM TRACING: CPT | Performed by: EMERGENCY MEDICINE

## 2024-03-21 PROCEDURE — 96361 HYDRATE IV INFUSION ADD-ON: CPT

## 2024-03-21 PROCEDURE — 2580000003 HC RX 258: Performed by: EMERGENCY MEDICINE

## 2024-03-21 PROCEDURE — 96374 THER/PROPH/DIAG INJ IV PUSH: CPT

## 2024-03-21 RX ORDER — KETOROLAC TROMETHAMINE 30 MG/ML
30 INJECTION, SOLUTION INTRAMUSCULAR; INTRAVENOUS
Status: COMPLETED | OUTPATIENT
Start: 2024-03-21 | End: 2024-03-21

## 2024-03-21 RX ORDER — PROCHLORPERAZINE EDISYLATE 5 MG/ML
10 INJECTION INTRAMUSCULAR; INTRAVENOUS ONCE
Status: COMPLETED | OUTPATIENT
Start: 2024-03-21 | End: 2024-03-21

## 2024-03-21 RX ORDER — DIPHENHYDRAMINE HYDROCHLORIDE 50 MG/ML
50 INJECTION INTRAMUSCULAR; INTRAVENOUS ONCE
Status: COMPLETED | OUTPATIENT
Start: 2024-03-21 | End: 2024-03-21

## 2024-03-21 RX ORDER — DEXAMETHASONE SODIUM PHOSPHATE 10 MG/ML
10 INJECTION, SOLUTION INTRAMUSCULAR; INTRAVENOUS ONCE
Status: COMPLETED | OUTPATIENT
Start: 2024-03-21 | End: 2024-03-21

## 2024-03-21 RX ORDER — 0.9 % SODIUM CHLORIDE 0.9 %
1000 INTRAVENOUS SOLUTION INTRAVENOUS ONCE
Status: COMPLETED | OUTPATIENT
Start: 2024-03-21 | End: 2024-03-21

## 2024-03-21 RX ADMIN — DEXAMETHASONE SODIUM PHOSPHATE 10 MG: 10 INJECTION, SOLUTION INTRAMUSCULAR; INTRAVENOUS at 13:04

## 2024-03-21 RX ADMIN — SODIUM CHLORIDE 1000 ML: 9 INJECTION, SOLUTION INTRAVENOUS at 12:59

## 2024-03-21 RX ADMIN — KETOROLAC TROMETHAMINE 30 MG: 30 INJECTION, SOLUTION INTRAMUSCULAR at 13:01

## 2024-03-21 RX ADMIN — PROCHLORPERAZINE EDISYLATE 10 MG: 5 INJECTION INTRAMUSCULAR; INTRAVENOUS at 13:02

## 2024-03-21 RX ADMIN — DIPHENHYDRAMINE HYDROCHLORIDE 50 MG: 50 INJECTION, SOLUTION INTRAMUSCULAR; INTRAVENOUS at 13:00

## 2024-03-21 ASSESSMENT — PAIN DESCRIPTION - LOCATION: LOCATION: HEAD

## 2024-03-21 ASSESSMENT — PAIN - FUNCTIONAL ASSESSMENT: PAIN_FUNCTIONAL_ASSESSMENT: 0-10

## 2024-03-21 ASSESSMENT — PAIN SCALES - GENERAL
PAINLEVEL_OUTOF10: 6
PAINLEVEL_OUTOF10: 9

## 2024-03-21 NOTE — ED PROVIDER NOTES
condition which required my urgent intervention.     CONSULTS:  None    PROCEDURES:  Unless otherwise noted below, none     Procedures    FINAL IMPRESSION      1. Intractable migraine with status migrainosus, unspecified migraine type          DISPOSITION/PLAN   DISPOSITION Decision To Discharge 03/21/2024 02:30:37 PM    PATIENT REFERRED TO:  Ambika Maguire MD  [address] [facility] [address] [phone]    Schedule an appointment as soon as possible for a visit       [facility] EMERGENCY DEPT  [address] [place] [address]  [phone]    As needed, If symptoms worsen      DISCHARGE MEDICATIONS:  New Prescriptions    No medications on file     Controlled Substances Monitoring:          No data to display                (Please note that portions of this note were completed with a voice recognition program.  Efforts were made to edit the dictations but occasionally words are mis-transcribed.)    Fran Brantley MD (electronically signed)  Attending Emergency Physician            Fran Brantley MD  03/21/24 1291

## 2024-03-21 NOTE — ED TRIAGE NOTES
Patient arrives to the ED via POV with complaints of a migraine that started yesterday morning.    PMH of migraines

## 2024-06-24 ENCOUNTER — HOSPITAL ENCOUNTER (EMERGENCY)
Facility: HOSPITAL | Age: 64
Discharge: HOME | End: 2024-06-24
Attending: EMERGENCY MEDICINE
Payer: MEDICARE

## 2024-06-24 ENCOUNTER — APPOINTMENT (OUTPATIENT)
Facility: HOSPITAL | Age: 64
End: 2024-06-24
Payer: MEDICARE

## 2024-06-24 VITALS
DIASTOLIC BLOOD PRESSURE: 65 MMHG | RESPIRATION RATE: 16 BRPM | SYSTOLIC BLOOD PRESSURE: 140 MMHG | BODY MASS INDEX: 22.35 KG/M2 | WEIGHT: 165 LBS | HEART RATE: 53 BPM | OXYGEN SATURATION: 98 % | HEIGHT: 72 IN | TEMPERATURE: 98.1 F

## 2024-06-24 DIAGNOSIS — R51.9 ACUTE NONINTRACTABLE HEADACHE, UNSPECIFIED HEADACHE TYPE: Primary | ICD-10-CM

## 2024-06-24 LAB
ALBUMIN SERPL-MCNC: 2.8 G/DL (ref 3.5–5)
ALBUMIN/GLOB SERPL: 0.8 (ref 1.1–2.2)
ALP SERPL-CCNC: 139 U/L (ref 45–117)
ALT SERPL-CCNC: 10 U/L (ref 12–78)
ANION GAP SERPL CALC-SCNC: 4 MMOL/L (ref 5–15)
AST SERPL-CCNC: 14 U/L (ref 15–37)
BASOPHILS # BLD: 0.1 K/UL (ref 0–0.1)
BASOPHILS NFR BLD: 1 % (ref 0–1)
BILIRUB SERPL-MCNC: 0.8 MG/DL (ref 0.2–1)
BUN SERPL-MCNC: 6 MG/DL (ref 6–20)
BUN/CREAT SERPL: 8 (ref 12–20)
CALCIUM SERPL-MCNC: 9.2 MG/DL (ref 8.5–10.1)
CHLORIDE SERPL-SCNC: 104 MMOL/L (ref 97–108)
CO2 SERPL-SCNC: 30 MMOL/L (ref 21–32)
COMMENT:: NORMAL
CREAT SERPL-MCNC: 0.74 MG/DL (ref 0.7–1.3)
DIFFERENTIAL METHOD BLD: ABNORMAL
EKG ATRIAL RATE: 47 BPM
EKG DIAGNOSIS: NORMAL
EKG P AXIS: 106 DEGREES
EKG P-R INTERVAL: 138 MS
EKG Q-T INTERVAL: 430 MS
EKG QRS DURATION: 84 MS
EKG QTC CALCULATION (BAZETT): 380 MS
EKG R AXIS: 66 DEGREES
EKG T AXIS: 54 DEGREES
EKG VENTRICULAR RATE: 47 BPM
EOSINOPHIL # BLD: 0.2 K/UL (ref 0–0.4)
EOSINOPHIL NFR BLD: 3 % (ref 0–7)
ERYTHROCYTE [DISTWIDTH] IN BLOOD BY AUTOMATED COUNT: 14.6 % (ref 11.5–14.5)
GLOBULIN SER CALC-MCNC: 3.7 G/DL (ref 2–4)
GLUCOSE SERPL-MCNC: 150 MG/DL (ref 65–100)
HCT VFR BLD AUTO: 41.1 % (ref 36.6–50.3)
HGB BLD-MCNC: 13.3 G/DL (ref 12.1–17)
IMM GRANULOCYTES # BLD AUTO: 0 K/UL (ref 0–0.04)
IMM GRANULOCYTES NFR BLD AUTO: 1 % (ref 0–0.5)
LIPASE SERPL-CCNC: 11 U/L (ref 13–75)
LYMPHOCYTES # BLD: 1.8 K/UL (ref 0.8–3.5)
LYMPHOCYTES NFR BLD: 21 % (ref 12–49)
MCH RBC QN AUTO: 29.5 PG (ref 26–34)
MCHC RBC AUTO-ENTMCNC: 32.4 G/DL (ref 30–36.5)
MCV RBC AUTO: 91.1 FL (ref 80–99)
MONOCYTES # BLD: 0.5 K/UL (ref 0–1)
MONOCYTES NFR BLD: 6 % (ref 5–13)
NEUTS SEG # BLD: 5.9 K/UL (ref 1.8–8)
NEUTS SEG NFR BLD: 68 % (ref 32–75)
NRBC # BLD: 0 K/UL (ref 0–0.01)
NRBC BLD-RTO: 0 PER 100 WBC
PLATELET # BLD AUTO: 268 K/UL (ref 150–400)
PMV BLD AUTO: 10.5 FL (ref 8.9–12.9)
POTASSIUM SERPL-SCNC: 4.7 MMOL/L (ref 3.5–5.1)
PROT SERPL-MCNC: 6.5 G/DL (ref 6.4–8.2)
RBC # BLD AUTO: 4.51 M/UL (ref 4.1–5.7)
SODIUM SERPL-SCNC: 138 MMOL/L (ref 136–145)
SPECIMEN HOLD: NORMAL
WBC # BLD AUTO: 8.4 K/UL (ref 4.1–11.1)

## 2024-06-24 PROCEDURE — 80053 COMPREHEN METABOLIC PANEL: CPT

## 2024-06-24 PROCEDURE — 83690 ASSAY OF LIPASE: CPT

## 2024-06-24 PROCEDURE — 2580000003 HC RX 258: Performed by: EMERGENCY MEDICINE

## 2024-06-24 PROCEDURE — 36415 COLL VENOUS BLD VENIPUNCTURE: CPT

## 2024-06-24 PROCEDURE — 85025 COMPLETE CBC W/AUTO DIFF WBC: CPT

## 2024-06-24 PROCEDURE — 93010 ELECTROCARDIOGRAM REPORT: CPT | Performed by: SPECIALIST

## 2024-06-24 PROCEDURE — 70450 CT HEAD/BRAIN W/O DYE: CPT

## 2024-06-24 PROCEDURE — 93005 ELECTROCARDIOGRAM TRACING: CPT | Performed by: EMERGENCY MEDICINE

## 2024-06-24 PROCEDURE — 96375 TX/PRO/DX INJ NEW DRUG ADDON: CPT

## 2024-06-24 PROCEDURE — 6360000002 HC RX W HCPCS: Performed by: EMERGENCY MEDICINE

## 2024-06-24 PROCEDURE — 99284 EMERGENCY DEPT VISIT MOD MDM: CPT

## 2024-06-24 PROCEDURE — 96374 THER/PROPH/DIAG INJ IV PUSH: CPT

## 2024-06-24 PROCEDURE — 94761 N-INVAS EAR/PLS OXIMETRY MLT: CPT

## 2024-06-24 RX ORDER — 0.9 % SODIUM CHLORIDE 0.9 %
1000 INTRAVENOUS SOLUTION INTRAVENOUS ONCE
Status: COMPLETED | OUTPATIENT
Start: 2024-06-24 | End: 2024-06-24

## 2024-06-24 RX ORDER — DIPHENHYDRAMINE HYDROCHLORIDE 50 MG/ML
12.5 INJECTION INTRAMUSCULAR; INTRAVENOUS
Status: COMPLETED | OUTPATIENT
Start: 2024-06-24 | End: 2024-06-24

## 2024-06-24 RX ORDER — PROCHLORPERAZINE EDISYLATE 5 MG/ML
10 INJECTION INTRAMUSCULAR; INTRAVENOUS ONCE
Status: COMPLETED | OUTPATIENT
Start: 2024-06-24 | End: 2024-06-24

## 2024-06-24 RX ORDER — DEXAMETHASONE SODIUM PHOSPHATE 10 MG/ML
10 INJECTION, SOLUTION INTRAMUSCULAR; INTRAVENOUS ONCE
Status: COMPLETED | OUTPATIENT
Start: 2024-06-24 | End: 2024-06-24

## 2024-06-24 RX ADMIN — PROCHLORPERAZINE EDISYLATE 10 MG: 5 INJECTION INTRAMUSCULAR; INTRAVENOUS at 12:35

## 2024-06-24 RX ADMIN — SODIUM CHLORIDE 1000 ML: 9 INJECTION, SOLUTION INTRAVENOUS at 12:34

## 2024-06-24 RX ADMIN — DEXAMETHASONE SODIUM PHOSPHATE 10 MG: 10 INJECTION, SOLUTION INTRAMUSCULAR; INTRAVENOUS at 13:04

## 2024-06-24 RX ADMIN — DIPHENHYDRAMINE HYDROCHLORIDE 12.5 MG: 50 INJECTION INTRAMUSCULAR; INTRAVENOUS at 12:34

## 2024-06-24 ASSESSMENT — ENCOUNTER SYMPTOMS
COUGH: 0
BACK PAIN: 0
ABDOMINAL PAIN: 0
VOMITING: 1
NAUSEA: 1
SORE THROAT: 0

## 2024-06-24 ASSESSMENT — PAIN SCALES - GENERAL: PAINLEVEL_OUTOF10: 8

## 2024-06-24 ASSESSMENT — PAIN DESCRIPTION - LOCATION: LOCATION: HEAD

## 2024-06-24 ASSESSMENT — PAIN - FUNCTIONAL ASSESSMENT: PAIN_FUNCTIONAL_ASSESSMENT: 0-10

## 2024-06-24 ASSESSMENT — PAIN DESCRIPTION - DESCRIPTORS: DESCRIPTORS: ACHING

## 2024-06-24 NOTE — ED TRIAGE NOTES
Patient reports migraine that began Friday evening, endorses light sensitivity and intermittent dizziness and nausea. His pain is mostly in the front of his head. Has a history of migraines and has tried taking sumatriptan and ubrelvy with no relief.

## 2024-06-24 NOTE — ED PROVIDER NOTES
[facility] EMERGENCY DEPT  EMERGENCY DEPARTMENT ENCOUNTER      Pt Name: Chris Egan  MRN: [id number]  Birthdate 1960  Date of evaluation: 6/24/2024  Provider: Gaviota Crespo MD    CHIEF COMPLAINT       Chief Complaint   Patient presents with    Migraine         HISTORY OF PRESENT ILLNESS    Chris Egan is a 64 yo M with h/o DM, a-fib, and migraines who has had headache and vomiting for the past 3 days.  He states that he took his sumatriptan but it did not help.  The headache is frontal and at first he had a lot of nausea and vomiting with that as well.         Additional history from independent historians:     Review of External Medical Records:     Nursing Notes were reviewed.    REVIEW OF SYSTEMS       Review of Systems   Constitutional:  Negative for fever.   HENT:  Negative for sore throat.    Eyes:  Negative for visual disturbance.   Respiratory:  Negative for cough.    Cardiovascular:  Negative for chest pain.   Gastrointestinal:  Positive for nausea and vomiting. Negative for abdominal pain.   Genitourinary:  Negative for dysuria.   Musculoskeletal:  Negative for back pain.   Skin:  Negative for rash.   Neurological:  Positive for headaches.       Except as noted above the remainder of the review of systems was reviewed and negative.       PAST MEDICAL HISTORY     Past Medical History:   Diagnosis Date    Anxiety     Arrhythmia     SINUS TACHY    Jacobs's esophagus 3/19/2012    CAD (coronary artery disease)     Carpal tunnel syndrome of right wrist     Cellulitis of right wrist 05/12/2020    Chronic pain     Depression     Diabetes mellitus (HCC) 3/19/2012    GERD (gastroesophageal reflux disease)     Headache     Hearing loss     History of MI (myocardial infarction) 3-00    History of peptic ulcer disease 3/19/2012    HTN (hypertension) 3/19/2012    Memory loss     Morbid obesity (HCC) 3/19/2012    2002-OPEN GASTRIC BYPASS    Other ill-defined conditions(799.89)     MIGRAINES    Psychiatric disorder

## 2024-06-24 NOTE — ED NOTES
Pt given dc instructions and education. Verbalized to follow up with neurology and return to ED should his symptoms worsen. Pt ambulatory w/ steady gait out of ED.

## 2024-07-05 ENCOUNTER — HOSPITAL ENCOUNTER (EMERGENCY)
Facility: HOSPITAL | Age: 64
Discharge: HOME | End: 2024-07-05
Attending: EMERGENCY MEDICINE
Payer: MEDICARE

## 2024-07-05 VITALS
OXYGEN SATURATION: 94 % | BODY MASS INDEX: 22.35 KG/M2 | HEIGHT: 72 IN | TEMPERATURE: 97.9 F | DIASTOLIC BLOOD PRESSURE: 64 MMHG | RESPIRATION RATE: 16 BRPM | HEART RATE: 59 BPM | SYSTOLIC BLOOD PRESSURE: 134 MMHG | WEIGHT: 165 LBS

## 2024-07-05 DIAGNOSIS — G43.911 INTRACTABLE MIGRAINE WITH STATUS MIGRAINOSUS, UNSPECIFIED MIGRAINE TYPE: Primary | ICD-10-CM

## 2024-07-05 PROCEDURE — 96361 HYDRATE IV INFUSION ADD-ON: CPT

## 2024-07-05 PROCEDURE — 96375 TX/PRO/DX INJ NEW DRUG ADDON: CPT

## 2024-07-05 PROCEDURE — 99284 EMERGENCY DEPT VISIT MOD MDM: CPT

## 2024-07-05 PROCEDURE — 2580000003 HC RX 258

## 2024-07-05 PROCEDURE — 96376 TX/PRO/DX INJ SAME DRUG ADON: CPT

## 2024-07-05 PROCEDURE — 94761 N-INVAS EAR/PLS OXIMETRY MLT: CPT

## 2024-07-05 PROCEDURE — 6370000000 HC RX 637 (ALT 250 FOR IP)

## 2024-07-05 PROCEDURE — 96365 THER/PROPH/DIAG IV INF INIT: CPT

## 2024-07-05 PROCEDURE — 6360000002 HC RX W HCPCS

## 2024-07-05 RX ORDER — DIPHENHYDRAMINE HYDROCHLORIDE 50 MG/ML
25 INJECTION INTRAMUSCULAR; INTRAVENOUS ONCE
Status: COMPLETED | OUTPATIENT
Start: 2024-07-05 | End: 2024-07-05

## 2024-07-05 RX ORDER — MAGNESIUM SULFATE IN WATER 40 MG/ML
2000 INJECTION, SOLUTION INTRAVENOUS ONCE
Status: COMPLETED | OUTPATIENT
Start: 2024-07-05 | End: 2024-07-05

## 2024-07-05 RX ORDER — 0.9 % SODIUM CHLORIDE 0.9 %
500 INTRAVENOUS SOLUTION INTRAVENOUS ONCE
Status: COMPLETED | OUTPATIENT
Start: 2024-07-05 | End: 2024-07-05

## 2024-07-05 RX ORDER — KETOROLAC TROMETHAMINE 15 MG/ML
15 INJECTION, SOLUTION INTRAMUSCULAR; INTRAVENOUS ONCE
Status: COMPLETED | OUTPATIENT
Start: 2024-07-05 | End: 2024-07-05

## 2024-07-05 RX ORDER — ACETAMINOPHEN 500 MG
1000 TABLET ORAL ONCE
Status: COMPLETED | OUTPATIENT
Start: 2024-07-05 | End: 2024-07-05

## 2024-07-05 RX ORDER — PROCHLORPERAZINE EDISYLATE 5 MG/ML
10 INJECTION INTRAMUSCULAR; INTRAVENOUS ONCE
Status: COMPLETED | OUTPATIENT
Start: 2024-07-05 | End: 2024-07-05

## 2024-07-05 RX ORDER — DEXAMETHASONE SODIUM PHOSPHATE 10 MG/ML
10 INJECTION, SOLUTION INTRAMUSCULAR; INTRAVENOUS ONCE
Status: COMPLETED | OUTPATIENT
Start: 2024-07-05 | End: 2024-07-05

## 2024-07-05 RX ADMIN — DIPHENHYDRAMINE HYDROCHLORIDE 25 MG: 50 INJECTION INTRAMUSCULAR; INTRAVENOUS at 16:16

## 2024-07-05 RX ADMIN — PROCHLORPERAZINE EDISYLATE 10 MG: 5 INJECTION INTRAMUSCULAR; INTRAVENOUS at 16:20

## 2024-07-05 RX ADMIN — PROCHLORPERAZINE EDISYLATE 10 MG: 5 INJECTION INTRAMUSCULAR; INTRAVENOUS at 14:50

## 2024-07-05 RX ADMIN — ACETAMINOPHEN 1000 MG: 500 TABLET ORAL at 14:50

## 2024-07-05 RX ADMIN — KETOROLAC TROMETHAMINE 15 MG: 15 INJECTION, SOLUTION INTRAMUSCULAR; INTRAVENOUS at 16:23

## 2024-07-05 RX ADMIN — KETOROLAC TROMETHAMINE 15 MG: 15 INJECTION, SOLUTION INTRAMUSCULAR; INTRAVENOUS at 14:50

## 2024-07-05 RX ADMIN — DEXAMETHASONE SODIUM PHOSPHATE 10 MG: 10 INJECTION, SOLUTION INTRAMUSCULAR; INTRAVENOUS at 16:18

## 2024-07-05 RX ADMIN — MAGNESIUM SULFATE HEPTAHYDRATE 2000 MG: 40 INJECTION, SOLUTION INTRAVENOUS at 18:09

## 2024-07-05 RX ADMIN — SODIUM CHLORIDE 500 ML: 9 INJECTION, SOLUTION INTRAVENOUS at 14:50

## 2024-07-05 ASSESSMENT — ENCOUNTER SYMPTOMS
RHINORRHEA: 0
SHORTNESS OF BREATH: 0
SORE THROAT: 0
NAUSEA: 1
VOMITING: 1

## 2024-07-05 ASSESSMENT — PAIN DESCRIPTION - LOCATION: LOCATION: HEAD

## 2024-07-05 ASSESSMENT — PAIN SCALES - GENERAL: PAINLEVEL_OUTOF10: 8

## 2024-07-05 ASSESSMENT — PAIN - FUNCTIONAL ASSESSMENT: PAIN_FUNCTIONAL_ASSESSMENT: 0-10

## 2024-07-05 ASSESSMENT — LIFESTYLE VARIABLES
HOW OFTEN DO YOU HAVE A DRINK CONTAINING ALCOHOL: MONTHLY OR LESS
HOW MANY STANDARD DRINKS CONTAINING ALCOHOL DO YOU HAVE ON A TYPICAL DAY: 1 OR 2

## 2024-07-05 NOTE — ED PROVIDER NOTES
[facility] EMERGENCY DEPT  EMERGENCY DEPARTMENT ENCOUNTER      Pt Name: Chris Egan  MRN: [id number]  Birthdate 1960  Date of evaluation: 7/5/2024  Provider: Zee Higginbotham DO    CHIEF COMPLAINT       Chief Complaint   Patient presents with    Headache         HISTORY OF PRESENT ILLNESS   (Location/Symptom, Timing/Onset, Context/Setting, Quality, Duration, Modifying Factors, Severity)  Note limiting factors.   64 y/o M presents to the ED with 4 day history of Migraines. Pt reports gradual onset of bilateral frontal pain described as pressure and \"feels like his head is going to explode.\" Pt states pain has been constant since onset. He also reports photophobia, nausea, and vomiting. He is followed by neurology for chronic migraines and has maintenance medications at home. He has taken Ubrelvy and Sumatriptan at home with minimal relief. He was seen in the ED on 06/24/24 with similar symptoms, at that time CT head without contrast which was negative for any intracranial abnormalities. He was given a migraine cocktail, symptoms resolved and he was discharged. Denies any dizziness, lightheadedness, numbness, tingling, or weakness of the extremities.     The history is provided by the patient.         Review of External Medical Records:     Nursing Notes were reviewed.    REVIEW OF SYSTEMS    (2-9 systems for level 4, 10 or more for level 5)     Review of Systems   Constitutional:  Negative for fever.   HENT:  Negative for congestion, rhinorrhea and sore throat.    Respiratory:  Negative for shortness of breath.    Cardiovascular:  Negative for chest pain.   Gastrointestinal:  Positive for nausea and vomiting.   Musculoskeletal:  Negative for neck stiffness.   Skin:  Negative for pallor.   Neurological:  Negative for dizziness and light-headedness.        Positive for frontal headache       Except as noted above the remainder of the review of systems was reviewed and negative.       PAST MEDICAL HISTORY     DICLOFENAC SODIUM (VOLTAREN) 1 % GEL    Apply 2 g topically 4 times daily as needed    DULOXETINE (CYMBALTA) 30 MG EXTENDED RELEASE CAPSULE    TAKE 1 ORAL CAPSULE ONCE A DAYTAKE WITH DULOXETINE 60MG FOR TOTAL DAILY DOSE OF 90MG    DULOXETINE (CYMBALTA) 60 MG EXTENDED RELEASE CAPSULE    Take 1 capsule by mouth daily    EMGALITY 120 MG/ML SOAJ    INJECT 1 ML BY SUBCUTANEOUS ROUTE EVERY TWENTY-EIGHT (28) DAYS    FAMOTIDINE (PEPCID) 20 MG TABLET    Take 1 tablet by mouth 2 times daily    LAMOTRIGINE (LAMICTAL) 100 MG TABLET    Take 2 tablets by mouth 2 times daily    LANSOPRAZOLE (PREVACID) 30 MG DELAYED RELEASE CAPSULE    Take by mouth 2 times daily    METOCLOPRAMIDE (REGLAN) 10 MG TABLET    Take 1 tablet by mouth 2 times daily    METOPROLOL SUCCINATE (TOPROL XL) 25 MG EXTENDED RELEASE TABLET    Take 1 tablet by mouth daily    ONDANSETRON (ZOFRAN-ODT) 4 MG DISINTEGRATING TABLET    Take 1 tablet by mouth every 8 hours as needed for Nausea or Vomiting    OXYCODONE-ACETAMINOPHEN (PERCOCET)  MG PER TABLET    Take 1 tablet by mouth every 8 hours as needed.    RIVAROXABAN (XARELTO) 20 MG TABS TABLET    ceived the following from [facility]: Outside name: Xarelto 20 mg tab tablet    ROSUVASTATIN (CRESTOR) 20 MG TABLET    Take 1 tablet by mouth daily    SUMATRIPTAN SUCCINATE REFILL 6 MG/0.5ML SOCT    INJECT 0.5 ML ONCE AS NEEDED FOR MIGRAINE UP TO 1 DOSE *INS LIMITS 4ML/30 DAYS*    TRAZODONE (DESYREL) 100 MG TABLET    Take 1 tablet by mouth    UBROGEPANT 100 MG TABS    1 at HA onset and repeat in 2 hours x 1 prn  Max 2 tabs/24 hours       ALLERGIES     Amoxicillin-pot clavulanate    FAMILY HISTORY       Family History   Problem Relation Age of Onset    Other Brother         aneurysm    Headache Brother     Alcohol Abuse Brother     Other Sister         aneurysm    Obesity Sister     Cancer Father         prostate    Heart Disease Mother     Emphysema Mother     Obesity Mother     Diabetes Mother

## 2024-07-05 NOTE — ED TRIAGE NOTES
Pt ambulatory to ED for migraine x 4 days with N/V, sensitivity to light.    Pt sees neurology and takes preventative meds. States has had increase in frequency with 4 migraines in last 2 months.

## 2024-08-19 ENCOUNTER — OFFICE VISIT (OUTPATIENT)
Age: 64
End: 2024-08-19
Payer: MEDICARE

## 2024-08-19 VITALS
HEART RATE: 64 BPM | RESPIRATION RATE: 20 BRPM | SYSTOLIC BLOOD PRESSURE: 148 MMHG | DIASTOLIC BLOOD PRESSURE: 74 MMHG | OXYGEN SATURATION: 97 %

## 2024-08-19 DIAGNOSIS — G47.00 INSOMNIA, UNSPECIFIED TYPE: ICD-10-CM

## 2024-08-19 DIAGNOSIS — G43.011 MIGRAINE WITHOUT AURA, INTRACTABLE, WITH STATUS MIGRAINOSUS: Primary | ICD-10-CM

## 2024-08-19 PROCEDURE — G8420 CALC BMI NORM PARAMETERS: HCPCS

## 2024-08-19 PROCEDURE — G8427 DOCREV CUR MEDS BY ELIG CLIN: HCPCS

## 2024-08-19 PROCEDURE — 4004F PT TOBACCO SCREEN RCVD TLK: CPT

## 2024-08-19 PROCEDURE — 3017F COLORECTAL CA SCREEN DOC REV: CPT

## 2024-08-19 PROCEDURE — 99214 OFFICE O/P EST MOD 30 MIN: CPT

## 2024-08-19 PROCEDURE — 3077F SYST BP >= 140 MM HG: CPT

## 2024-08-19 PROCEDURE — 3078F DIAST BP <80 MM HG: CPT

## 2024-08-19 RX ORDER — DULOXETIN HYDROCHLORIDE 30 MG/1
CAPSULE, DELAYED RELEASE ORAL
Qty: 60 CAPSULE | Refills: 2 | Status: SHIPPED | OUTPATIENT
Start: 2024-08-19

## 2024-08-19 NOTE — PROGRESS NOTES
Headaches- In the last 3 months they have been so bad   More stress in the last 6 weeks but they were bad before that   Ubrelvy, sumatriptan shot and sometimes they do not work   Takes them on the onset and it depends on how bad they are what rescue meds he takes   HE can take 2 BC powders twice a day on top of ibuprofen   He will take 1 of the ibuprofen and that is for the daily headaches that he is taking, and still gets not much relief     
edema and had full range of motion of joints.    Psych: Good mood and bright affect    NEUROLOGICAL EXAMINATION:    Mental Status: Alert and oriented to person, place, and time    Cranial Nerves:    II, III, IV, VI: Visual acuity grossly intact. Visual fields are normal.    Pupils are equal, round, and reactive to light and accommodation.    Extra-ocular movements are full and fluid. Fundoscopic exam was benign, no ptosis or nystagmus.    V-XII: Hearing is grossly intact. Facial features are symmetric, with normal sensation and strength. The palate rises symmetrically and the tongue protrudes midline.     Motor Examination: Normal tone, bulk, and strength, 5/5 muscle strength throughout.     Coordination:  No resting or intention tremor    Gait and Station: Steady while walking. Normal arm swing. No pronator drift. No muscle wasting or fasiculations noted.     Orders Placed This Encounter    [facility] - Michael Barrios MD, Sleep Medicine, [place]     Referral Priority:   Routine     Referral Type:   Eval and Treat     Referral Reason:   Specialty Services Required     Referred to Provider:   Michael Barrios MD     Requested Specialty:   Sleep Medicine Family Practice     Number of Visits Requested:   1    DULoxetine (CYMBALTA) 30 MG extended release capsule     Sig: Take 1 pill nightly for 30 days. If needed, may increase the dose to 2 pills.     Dispense:  60 capsule     Refill:  2        1. Migraine without aura, intractable, with status migrainosus    2. Insomnia, unspecified type      Patient was previously on duloxetine 60 mg a day for depression and would like to get back onto it.  Advised to take 30 mg nightly for 1 month and then if needed he may increase to 60 mg.  Advised that his psychiatrist should adjusted any further than this.  Continue using Emgality 120 mg monthly for migraine prevention  Continue using sumatriptan 6 mg injections and Ubrelvy 100 mg for rescue therapies as needed.  Advised to stop using

## 2024-08-22 ENCOUNTER — TELEPHONE (OUTPATIENT)
Age: 64
End: 2024-08-22

## 2024-08-22 NOTE — TELEPHONE ENCOUNTER
Phoned patient to schedule a sleep consult per Suzanne Wheeler APRN - NP.  YENI with patient's spouse requesting a return call.

## 2024-08-29 ENCOUNTER — OFFICE VISIT (OUTPATIENT)
Age: 64
End: 2024-08-29
Payer: MEDICARE

## 2024-08-29 VITALS
SYSTOLIC BLOOD PRESSURE: 133 MMHG | OXYGEN SATURATION: 98 % | BODY MASS INDEX: 22.88 KG/M2 | DIASTOLIC BLOOD PRESSURE: 79 MMHG | HEIGHT: 72 IN | WEIGHT: 168.9 LBS | HEART RATE: 80 BPM

## 2024-08-29 DIAGNOSIS — G47.00 INSOMNIA, UNSPECIFIED TYPE: Primary | ICD-10-CM

## 2024-08-29 PROCEDURE — 99203 OFFICE O/P NEW LOW 30 MIN: CPT | Performed by: SPECIALIST

## 2024-08-29 PROCEDURE — G8427 DOCREV CUR MEDS BY ELIG CLIN: HCPCS | Performed by: SPECIALIST

## 2024-08-29 PROCEDURE — G8420 CALC BMI NORM PARAMETERS: HCPCS | Performed by: SPECIALIST

## 2024-08-29 PROCEDURE — 3017F COLORECTAL CA SCREEN DOC REV: CPT | Performed by: SPECIALIST

## 2024-08-29 PROCEDURE — 3075F SYST BP GE 130 - 139MM HG: CPT | Performed by: SPECIALIST

## 2024-08-29 PROCEDURE — 3078F DIAST BP <80 MM HG: CPT | Performed by: SPECIALIST

## 2024-08-29 PROCEDURE — 4004F PT TOBACCO SCREEN RCVD TLK: CPT | Performed by: SPECIALIST

## 2024-08-29 ASSESSMENT — SLEEP AND FATIGUE QUESTIONNAIRES
HOW LIKELY ARE YOU TO NOD OFF OR FALL ASLEEP WHILE SITTING AND READING: SLIGHT CHANCE OF DOZING
ESS TOTAL SCORE: 9
HOW LIKELY ARE YOU TO NOD OFF OR FALL ASLEEP WHILE WATCHING TV: MODERATE CHANCE OF DOZING
HOW LIKELY ARE YOU TO NOD OFF OR FALL ASLEEP WHEN YOU ARE A PASSENGER IN A CAR FOR AN HOUR WITHOUT A BREAK: SLIGHT CHANCE OF DOZING
HOW LIKELY ARE YOU TO NOD OFF OR FALL ASLEEP WHILE SITTING AND TALKING TO SOMEONE: WOULD NEVER DOZE
HOW LIKELY ARE YOU TO NOD OFF OR FALL ASLEEP WHILE LYING DOWN TO REST IN THE AFTERNOON WHEN CIRCUMSTANCES PERMIT: MODERATE CHANCE OF DOZING
HOW LIKELY ARE YOU TO NOD OFF OR FALL ASLEEP IN A CAR, WHILE STOPPED FOR A FEW MINUTES IN TRAFFIC: WOULD NEVER DOZE
HOW LIKELY ARE YOU TO NOD OFF OR FALL ASLEEP WHILE SITTING QUIETLY AFTER LUNCH WITHOUT ALCOHOL: MODERATE CHANCE OF DOZING
HOW LIKELY ARE YOU TO NOD OFF OR FALL ASLEEP WHILE SITTING INACTIVE IN A PUBLIC PLACE: SLIGHT CHANCE OF DOZING

## 2024-08-29 NOTE — PROGRESS NOTES
[facility]  [facility]  [address] [facility] [address]  Dept: [phone]           [address]  [place] [address]  Tel.  [phone]  Fax. [phone] [address]  [place] [address]  Tel.  [phone]  Fax. [phone] [address] [place] [address]  Tel.  [phone]  Fax. [phone]       Chief Complaint       Chief Complaint   Patient presents with    Sleep Problem     NP refd by Suzanne Wheeler APRN-NP for insomnia        HPI      Chris Egan is 63 y.o. male seen for evaluation of a sleep disorder.     The patient reports he has experienced sleep difficulties.  Has a history of ongoing headaches which he has been seen in the ER on a number of occasions.      Sleep evaluation in the past.  Patient noted \"gasping\" which resolved following gastric bypass surgery subsequent to which he lost approximately 200 pounds.      Currently to bed by 8-8: 30 PM; asleep by 10.  Currently not snoring.  Will awaken several times during the night.  Notes vivid dreams/nightmares, apparent bruxism.      At times may doze if seated and inactive dinner such as when watching TV, sitting quietly after lunch.    Franklin Sleepiness Scale: 9            8/29/2024     2:03 PM   Sleep Medicine   Sitting and reading 1   Watching TV 2   Sitting, inactive in a public place (e.g. a theatre or a meeting) 1   As a passenger in a car for an hour without a break 1   Lying down to rest in the afternoon when circumstances permit 2   Sitting and talking to someone 0   Sitting quietly after a lunch without alcohol 2   In a car, while stopped for a few minutes in traffic 0   Franklin Sleepiness Score 9   Neck (Inches) 16.5        Allergies   Allergen Reactions    Amoxicillin-Pot Clavulanate Nausea Only         Current Outpatient Medications:     DULoxetine (CYMBALTA) 30 MG extended release capsule, Take 1 pill nightly

## 2024-09-27 DIAGNOSIS — G43.011 MIGRAINE WITHOUT AURA, INTRACTABLE, WITH STATUS MIGRAINOSUS: ICD-10-CM

## 2024-10-01 ENCOUNTER — OFFICE VISIT (OUTPATIENT)
Age: 64
End: 2024-10-01
Payer: MEDICARE

## 2024-10-01 VITALS
BODY MASS INDEX: 22.75 KG/M2 | WEIGHT: 168 LBS | HEART RATE: 84 BPM | RESPIRATION RATE: 16 BRPM | HEIGHT: 72 IN | OXYGEN SATURATION: 98 % | SYSTOLIC BLOOD PRESSURE: 116 MMHG | DIASTOLIC BLOOD PRESSURE: 62 MMHG

## 2024-10-01 DIAGNOSIS — G43.011 MIGRAINE WITHOUT AURA, INTRACTABLE, WITH STATUS MIGRAINOSUS: ICD-10-CM

## 2024-10-01 PROCEDURE — 99214 OFFICE O/P EST MOD 30 MIN: CPT | Performed by: PSYCHIATRY & NEUROLOGY

## 2024-10-01 PROCEDURE — 3017F COLORECTAL CA SCREEN DOC REV: CPT | Performed by: PSYCHIATRY & NEUROLOGY

## 2024-10-01 PROCEDURE — G8427 DOCREV CUR MEDS BY ELIG CLIN: HCPCS | Performed by: PSYCHIATRY & NEUROLOGY

## 2024-10-01 PROCEDURE — 3074F SYST BP LT 130 MM HG: CPT | Performed by: PSYCHIATRY & NEUROLOGY

## 2024-10-01 PROCEDURE — 3078F DIAST BP <80 MM HG: CPT | Performed by: PSYCHIATRY & NEUROLOGY

## 2024-10-01 PROCEDURE — G8420 CALC BMI NORM PARAMETERS: HCPCS | Performed by: PSYCHIATRY & NEUROLOGY

## 2024-10-01 PROCEDURE — 4004F PT TOBACCO SCREEN RCVD TLK: CPT | Performed by: PSYCHIATRY & NEUROLOGY

## 2024-10-01 PROCEDURE — G8484 FLU IMMUNIZE NO ADMIN: HCPCS | Performed by: PSYCHIATRY & NEUROLOGY

## 2024-10-01 RX ORDER — HYDROXYZINE HYDROCHLORIDE 25 MG/1
TABLET, FILM COATED ORAL
COMMUNITY
Start: 2024-09-24

## 2024-10-01 RX ORDER — SUMATRIPTAN SUCCINATE 6 MG/.5ML
INJECTION, SOLUTION SUBCUTANEOUS
Qty: 15 ML | Refills: 11 | Status: SHIPPED | OUTPATIENT
Start: 2024-10-01

## 2024-10-01 RX ORDER — IBUPROFEN 600 MG/1
TABLET, FILM COATED ORAL
COMMUNITY
Start: 2024-08-07

## 2024-10-01 RX ORDER — MULTIVITAMIN
1 TABLET ORAL DAILY
COMMUNITY

## 2024-10-01 RX ORDER — ONDANSETRON 4 MG/1
4 TABLET, ORALLY DISINTEGRATING ORAL EVERY 8 HOURS PRN
Qty: 20 TABLET | Refills: 5 | Status: SHIPPED | OUTPATIENT
Start: 2024-10-01

## 2024-10-01 RX ORDER — TRAZODONE HYDROCHLORIDE 50 MG/1
TABLET, FILM COATED ORAL
COMMUNITY
Start: 2024-09-17

## 2024-10-01 ASSESSMENT — PATIENT HEALTH QUESTIONNAIRE - PHQ9
SUM OF ALL RESPONSES TO PHQ QUESTIONS 1-9: 17
8. MOVING OR SPEAKING SO SLOWLY THAT OTHER PEOPLE COULD HAVE NOTICED. OR THE OPPOSITE, BEING SO FIGETY OR RESTLESS THAT YOU HAVE BEEN MOVING AROUND A LOT MORE THAN USUAL: NOT AT ALL
2. FEELING DOWN, DEPRESSED OR HOPELESS: NEARLY EVERY DAY
5. POOR APPETITE OR OVEREATING: NEARLY EVERY DAY
SUM OF ALL RESPONSES TO PHQ QUESTIONS 1-9: 17
6. FEELING BAD ABOUT YOURSELF - OR THAT YOU ARE A FAILURE OR HAVE LET YOURSELF OR YOUR FAMILY DOWN: SEVERAL DAYS
1. LITTLE INTEREST OR PLEASURE IN DOING THINGS: NEARLY EVERY DAY
SUM OF ALL RESPONSES TO PHQ QUESTIONS 1-9: 17
SUM OF ALL RESPONSES TO PHQ QUESTIONS 1-9: 17
4. FEELING TIRED OR HAVING LITTLE ENERGY: NEARLY EVERY DAY
7. TROUBLE CONCENTRATING ON THINGS, SUCH AS READING THE NEWSPAPER OR WATCHING TELEVISION: SEVERAL DAYS
9. THOUGHTS THAT YOU WOULD BE BETTER OFF DEAD, OR OF HURTING YOURSELF: NOT AT ALL
SUM OF ALL RESPONSES TO PHQ9 QUESTIONS 1 & 2: 6
10. IF YOU CHECKED OFF ANY PROBLEMS, HOW DIFFICULT HAVE THESE PROBLEMS MADE IT FOR YOU TO DO YOUR WORK, TAKE CARE OF THINGS AT HOME, OR GET ALONG WITH OTHER PEOPLE: SOMEWHAT DIFFICULT
3. TROUBLE FALLING OR STAYING ASLEEP: NEARLY EVERY DAY

## 2024-10-01 NOTE — PROGRESS NOTES
1 tablet by mouth daily      rivaroxaban (XARELTO) 20 MG TABS tablet ceived the following from [facility]: Outside name: Xarelto 20 mg tab tablet (Patient not taking: Reported on 10/1/2024)       No current facility-administered medications for this visit.      Allergies   Allergen Reactions    Amoxicillin-Pot Clavulanate Nausea Only     Social History     Tobacco Use    Smoking status: Every Day     Current packs/day: 1.00     Average packs/day: 1 pack/day for 45.0 years (45.0 ttl pk-yrs)     Types: Cigarettes    Smokeless tobacco: Never   Vaping Use    Vaping status: Never Used   Substance Use Topics    Alcohol use: Yes     Alcohol/week: 2.0 standard drinks of alcohol     Types: 1 Shots of liquor, 1 Drinks containing 0.5 oz of alcohol per week    Drug use: No     63-year-old gent gentleman following up today for intractable migraine headaches.  I saw him last in September 2022 and at that time he was on Emgality and Imitrex.  He had some Ubrelvy as well.  He notes that he has had increased headaches of late that he correlates with his increasing depression and anxiety.  He is back on Cymbalta.  He has seen a psychiatrist.  He sees his therapist weekly.  He has been started on some trazodone.    Office visit with nurse practitioner Liam dated August 19, 2024 where he started to have increased stress and started to get daily headaches.  He was using BC powder 2 or more times a day.  He stopped seeing his psychiatrist and was off Cymbalta that he was taking for depression.  Patient was restarted on Cymbalta.  He was continued on Emgality and Imitrex injections.  He was counseled to stop using so much BC powder.    Examination  /62   Pulse 84   Resp 16   Ht 1.829 m (6')   Wt 76.2 kg (168 lb)   SpO2 98%   BMI 22.78 kg/m²   Pleasant gentleman.  He is awake alert and oriented.  Speech and language normal.  Cognition normal.  No ataxia.  Steady gait.    Impression/Plan  Migraine headaches

## 2024-10-17 ENCOUNTER — TELEPHONE (OUTPATIENT)
Age: 64
End: 2024-10-17

## 2024-10-17 NOTE — TELEPHONE ENCOUNTER
Re: ubrelvy 100mg 16    Rcvd PA in [facility], key# [id number], submitted and awaiting outcome.     10/24/24: Approval rcvd. Auth# [id number], effective 10/17/24-10/17/25. Will scan letter once rcvd. Update to nurse.

## 2024-11-03 ENCOUNTER — HOSPITAL ENCOUNTER (EMERGENCY)
Facility: HOSPITAL | Age: 64
Discharge: HOME | End: 2024-11-03
Attending: STUDENT IN AN ORGANIZED HEALTH CARE EDUCATION/TRAINING PROGRAM
Payer: MEDICARE

## 2024-11-03 VITALS
OXYGEN SATURATION: 100 % | TEMPERATURE: 97.9 F | BODY MASS INDEX: 23.7 KG/M2 | DIASTOLIC BLOOD PRESSURE: 52 MMHG | HEART RATE: 60 BPM | HEIGHT: 72 IN | RESPIRATION RATE: 16 BRPM | WEIGHT: 175 LBS | SYSTOLIC BLOOD PRESSURE: 102 MMHG

## 2024-11-03 DIAGNOSIS — G44.209 ACUTE NON INTRACTABLE TENSION-TYPE HEADACHE: Primary | ICD-10-CM

## 2024-11-03 LAB
ALBUMIN SERPL-MCNC: 3.4 G/DL (ref 3.5–5)
ALBUMIN/GLOB SERPL: 0.9 (ref 1.1–2.2)
ALP SERPL-CCNC: 158 U/L (ref 45–117)
ALT SERPL-CCNC: 16 U/L (ref 12–78)
ANION GAP SERPL CALC-SCNC: 3 MMOL/L (ref 2–12)
AST SERPL-CCNC: 18 U/L (ref 15–37)
BASOPHILS # BLD: 0.1 K/UL (ref 0–0.1)
BASOPHILS NFR BLD: 1 % (ref 0–1)
BILIRUB SERPL-MCNC: 0.3 MG/DL (ref 0.2–1)
BUN SERPL-MCNC: 6 MG/DL (ref 6–20)
BUN/CREAT SERPL: 8 (ref 12–20)
CALCIUM SERPL-MCNC: 9.4 MG/DL (ref 8.5–10.1)
CHLORIDE SERPL-SCNC: 105 MMOL/L (ref 97–108)
CO2 SERPL-SCNC: 29 MMOL/L (ref 21–32)
CREAT SERPL-MCNC: 0.79 MG/DL (ref 0.7–1.3)
DIFFERENTIAL METHOD BLD: NORMAL
EOSINOPHIL # BLD: 0.2 K/UL (ref 0–0.4)
EOSINOPHIL NFR BLD: 3 % (ref 0–7)
ERYTHROCYTE [DISTWIDTH] IN BLOOD BY AUTOMATED COUNT: 14 % (ref 11.5–14.5)
GLOBULIN SER CALC-MCNC: 3.9 G/DL (ref 2–4)
GLUCOSE SERPL-MCNC: 119 MG/DL (ref 65–100)
HCT VFR BLD AUTO: 43.5 % (ref 36.6–50.3)
HGB BLD-MCNC: 14.1 G/DL (ref 12.1–17)
IMM GRANULOCYTES # BLD AUTO: 0 K/UL (ref 0–0.04)
IMM GRANULOCYTES NFR BLD AUTO: 0 % (ref 0–0.5)
LYMPHOCYTES # BLD: 1.9 K/UL (ref 0.8–3.5)
LYMPHOCYTES NFR BLD: 23 % (ref 12–49)
MCH RBC QN AUTO: 29.1 PG (ref 26–34)
MCHC RBC AUTO-ENTMCNC: 32.4 G/DL (ref 30–36.5)
MCV RBC AUTO: 89.7 FL (ref 80–99)
MONOCYTES # BLD: 0.6 K/UL (ref 0–1)
MONOCYTES NFR BLD: 7 % (ref 5–13)
NEUTS SEG # BLD: 5.8 K/UL (ref 1.8–8)
NEUTS SEG NFR BLD: 66 % (ref 32–75)
NRBC # BLD: 0 K/UL (ref 0–0.01)
NRBC BLD-RTO: 0 PER 100 WBC
PLATELET # BLD AUTO: 318 K/UL (ref 150–400)
PMV BLD AUTO: 10.8 FL (ref 8.9–12.9)
POTASSIUM SERPL-SCNC: 4.2 MMOL/L (ref 3.5–5.1)
PROT SERPL-MCNC: 7.3 G/DL (ref 6.4–8.2)
RBC # BLD AUTO: 4.85 M/UL (ref 4.1–5.7)
SODIUM SERPL-SCNC: 137 MMOL/L (ref 136–145)
WBC # BLD AUTO: 8.6 K/UL (ref 4.1–11.1)

## 2024-11-03 PROCEDURE — 94761 N-INVAS EAR/PLS OXIMETRY MLT: CPT

## 2024-11-03 PROCEDURE — 80053 COMPREHEN METABOLIC PANEL: CPT

## 2024-11-03 PROCEDURE — 99284 EMERGENCY DEPT VISIT MOD MDM: CPT

## 2024-11-03 PROCEDURE — 96374 THER/PROPH/DIAG INJ IV PUSH: CPT

## 2024-11-03 PROCEDURE — 6360000002 HC RX W HCPCS: Performed by: PHYSICIAN ASSISTANT

## 2024-11-03 PROCEDURE — 36415 COLL VENOUS BLD VENIPUNCTURE: CPT

## 2024-11-03 PROCEDURE — 96375 TX/PRO/DX INJ NEW DRUG ADDON: CPT

## 2024-11-03 PROCEDURE — 85025 COMPLETE CBC W/AUTO DIFF WBC: CPT

## 2024-11-03 RX ORDER — PROCHLORPERAZINE EDISYLATE 5 MG/ML
10 INJECTION INTRAMUSCULAR; INTRAVENOUS ONCE
Status: COMPLETED | OUTPATIENT
Start: 2024-11-03 | End: 2024-11-03

## 2024-11-03 RX ORDER — KETOROLAC TROMETHAMINE 10 MG/1
10 TABLET, FILM COATED ORAL EVERY 6 HOURS PRN
Qty: 20 TABLET | Refills: 0 | Status: SHIPPED | OUTPATIENT
Start: 2024-11-03

## 2024-11-03 RX ORDER — PROCHLORPERAZINE MALEATE 10 MG
10 TABLET ORAL 2 TIMES DAILY
Qty: 10 TABLET | Refills: 0 | Status: SHIPPED | OUTPATIENT
Start: 2024-11-03 | End: 2024-11-08

## 2024-11-03 RX ORDER — KETOROLAC TROMETHAMINE 15 MG/ML
15 INJECTION, SOLUTION INTRAMUSCULAR; INTRAVENOUS
Status: COMPLETED | OUTPATIENT
Start: 2024-11-03 | End: 2024-11-03

## 2024-11-03 RX ORDER — DIPHENHYDRAMINE HCL 50 MG
50 CAPSULE ORAL DAILY
Qty: 30 CAPSULE | Refills: 0 | Status: SHIPPED | OUTPATIENT
Start: 2024-11-03 | End: 2024-12-03

## 2024-11-03 RX ORDER — DIPHENHYDRAMINE HYDROCHLORIDE 50 MG/ML
50 INJECTION INTRAMUSCULAR; INTRAVENOUS
Status: COMPLETED | OUTPATIENT
Start: 2024-11-03 | End: 2024-11-03

## 2024-11-03 RX ORDER — DEXAMETHASONE SODIUM PHOSPHATE 10 MG/ML
10 INJECTION, SOLUTION INTRAMUSCULAR; INTRAVENOUS ONCE
Status: COMPLETED | OUTPATIENT
Start: 2024-11-03 | End: 2024-11-03

## 2024-11-03 RX ADMIN — KETOROLAC TROMETHAMINE 15 MG: 15 INJECTION, SOLUTION INTRAMUSCULAR; INTRAVENOUS at 17:04

## 2024-11-03 RX ADMIN — DIPHENHYDRAMINE HYDROCHLORIDE 50 MG: 50 INJECTION INTRAMUSCULAR; INTRAVENOUS at 17:04

## 2024-11-03 RX ADMIN — PROCHLORPERAZINE EDISYLATE 10 MG: 5 INJECTION INTRAMUSCULAR; INTRAVENOUS at 17:04

## 2024-11-03 RX ADMIN — DEXAMETHASONE SODIUM PHOSPHATE 10 MG: 10 INJECTION, SOLUTION INTRAMUSCULAR; INTRAVENOUS at 17:04

## 2024-11-03 ASSESSMENT — PAIN - FUNCTIONAL ASSESSMENT: PAIN_FUNCTIONAL_ASSESSMENT: 0-10

## 2024-11-03 ASSESSMENT — PAIN DESCRIPTION - PAIN TYPE: TYPE: ACUTE PAIN

## 2024-11-03 ASSESSMENT — PAIN SCALES - GENERAL: PAINLEVEL_OUTOF10: 8

## 2024-11-03 ASSESSMENT — PAIN DESCRIPTION - LOCATION: LOCATION: HEAD

## 2024-11-03 ASSESSMENT — PAIN DESCRIPTION - DESCRIPTORS: DESCRIPTORS: ACHING;POUNDING

## 2024-11-03 NOTE — ED TRIAGE NOTES
Patient is a 63 year old male complaining of a migraine headache. Patient reports history of such. Patient reports the pain is in the front of the head and constant x 2 days. Patient states he's taken his usual home meds with little relief. Patient alert and oriented x4   Expiration Date (Month Year): 7/24

## 2024-11-03 NOTE — DISCHARGE INSTRUCTIONS
Keep a headache diary so that she can avoid headache triggers.  Make sure that you are staying well-hydrated with 32 to 64 ounces of water daily.  Make sure you are getting 7 to 8 hours of sleep daily.  You can take Compazine and Benadryl for headaches with nausea.  If you decide to take Toradol, do not take any other anti-inflammatories.  Other anti-inflammatories include naproxen, ibuprofen, Naprosyn, Advil, Diflucan.  Discontinue medication if any adverse side effects.  Take medication as prescribed.  Return immediately if any new or worsening symptoms.  Thank you for allowing us to be a part of your care.

## 2024-11-26 DIAGNOSIS — G43.011 MIGRAINE WITHOUT AURA, INTRACTABLE, WITH STATUS MIGRAINOSUS: ICD-10-CM

## 2024-11-26 RX ORDER — GALCANEZUMAB 120 MG/ML
INJECTION, SOLUTION SUBCUTANEOUS
Qty: 1 ADJUSTABLE DOSE PRE-FILLED PEN SYRINGE | Refills: 11 | Status: SHIPPED | OUTPATIENT
Start: 2024-11-26

## 2025-01-22 RX ORDER — CEFUROXIME AXETIL 250 MG/1
TABLET ORAL
Refills: 44 | OUTPATIENT
Start: 2025-01-22

## 2025-03-21 DIAGNOSIS — G43.011 MIGRAINE WITHOUT AURA, INTRACTABLE, WITH STATUS MIGRAINOSUS: ICD-10-CM

## 2025-03-24 RX ORDER — ONDANSETRON 4 MG/1
4 TABLET, ORALLY DISINTEGRATING ORAL EVERY 8 HOURS PRN
Qty: 20 TABLET | Refills: 5 | Status: SHIPPED | OUTPATIENT
Start: 2025-03-24

## 2025-05-22 ENCOUNTER — HOSPITAL ENCOUNTER (EMERGENCY)
Facility: HOSPITAL | Age: 65
Discharge: HOME | End: 2025-05-22
Attending: EMERGENCY MEDICINE
Payer: MEDICARE

## 2025-05-22 VITALS
DIASTOLIC BLOOD PRESSURE: 63 MMHG | TEMPERATURE: 98.2 F | RESPIRATION RATE: 18 BRPM | OXYGEN SATURATION: 99 % | HEART RATE: 66 BPM | SYSTOLIC BLOOD PRESSURE: 125 MMHG

## 2025-05-22 DIAGNOSIS — R51.9 NONINTRACTABLE HEADACHE, UNSPECIFIED CHRONICITY PATTERN, UNSPECIFIED HEADACHE TYPE: Primary | ICD-10-CM

## 2025-05-22 LAB
ALBUMIN SERPL-MCNC: 3.3 G/DL (ref 3.5–5)
ALBUMIN/GLOB SERPL: 0.9 (ref 1.1–2.2)
ALP SERPL-CCNC: 178 U/L (ref 45–117)
ALT SERPL-CCNC: 24 U/L (ref 12–78)
ANION GAP SERPL CALC-SCNC: 3 MMOL/L (ref 2–12)
AST SERPL-CCNC: 15 U/L (ref 15–37)
BASOPHILS # BLD: 0.06 K/UL (ref 0–0.1)
BASOPHILS NFR BLD: 0.7 % (ref 0–1)
BILIRUB SERPL-MCNC: 0.3 MG/DL (ref 0.2–1)
BUN SERPL-MCNC: 10 MG/DL (ref 6–20)
BUN/CREAT SERPL: 10 (ref 12–20)
CALCIUM SERPL-MCNC: 9.2 MG/DL (ref 8.5–10.1)
CHLORIDE SERPL-SCNC: 104 MMOL/L (ref 97–108)
CO2 SERPL-SCNC: 28 MMOL/L (ref 21–32)
CREAT SERPL-MCNC: 0.96 MG/DL (ref 0.7–1.3)
DIFFERENTIAL METHOD BLD: ABNORMAL
EOSINOPHIL # BLD: 0.2 K/UL (ref 0–0.4)
EOSINOPHIL NFR BLD: 2.4 % (ref 0–7)
ERYTHROCYTE [DISTWIDTH] IN BLOOD BY AUTOMATED COUNT: 14.6 % (ref 11.5–14.5)
GLOBULIN SER CALC-MCNC: 3.7 G/DL (ref 2–4)
GLUCOSE SERPL-MCNC: 164 MG/DL (ref 65–100)
HCT VFR BLD AUTO: 40.4 % (ref 36.6–50.3)
HGB BLD-MCNC: 13 G/DL (ref 12.1–17)
IMM GRANULOCYTES # BLD AUTO: 0.03 K/UL (ref 0–0.04)
IMM GRANULOCYTES NFR BLD AUTO: 0.4 % (ref 0–0.5)
LYMPHOCYTES # BLD: 1.3 K/UL (ref 0.8–3.5)
LYMPHOCYTES NFR BLD: 15.7 % (ref 12–49)
MCH RBC QN AUTO: 28.5 PG (ref 26–34)
MCHC RBC AUTO-ENTMCNC: 32.2 G/DL (ref 30–36.5)
MCV RBC AUTO: 88.6 FL (ref 80–99)
MONOCYTES # BLD: 0.35 K/UL (ref 0–1)
MONOCYTES NFR BLD: 4.2 % (ref 5–13)
NEUTS SEG # BLD: 6.32 K/UL (ref 1.8–8)
NEUTS SEG NFR BLD: 76.6 % (ref 32–75)
NRBC # BLD: 0 K/UL (ref 0–0.01)
NRBC BLD-RTO: 0 PER 100 WBC
PLATELET # BLD AUTO: 292 K/UL (ref 150–400)
PMV BLD AUTO: 10.5 FL (ref 8.9–12.9)
POTASSIUM SERPL-SCNC: 3.9 MMOL/L (ref 3.5–5.1)
PROT SERPL-MCNC: 7 G/DL (ref 6.4–8.2)
RBC # BLD AUTO: 4.56 M/UL (ref 4.1–5.7)
SODIUM SERPL-SCNC: 135 MMOL/L (ref 136–145)
WBC # BLD AUTO: 8.3 K/UL (ref 4.1–11.1)

## 2025-05-22 PROCEDURE — 80053 COMPREHEN METABOLIC PANEL: CPT

## 2025-05-22 PROCEDURE — 36415 COLL VENOUS BLD VENIPUNCTURE: CPT

## 2025-05-22 PROCEDURE — 2580000003 HC RX 258: Performed by: EMERGENCY MEDICINE

## 2025-05-22 PROCEDURE — 6360000002 HC RX W HCPCS: Performed by: EMERGENCY MEDICINE

## 2025-05-22 PROCEDURE — 85025 COMPLETE CBC W/AUTO DIFF WBC: CPT

## 2025-05-22 PROCEDURE — 96375 TX/PRO/DX INJ NEW DRUG ADDON: CPT

## 2025-05-22 PROCEDURE — 96374 THER/PROPH/DIAG INJ IV PUSH: CPT

## 2025-05-22 PROCEDURE — 99284 EMERGENCY DEPT VISIT MOD MDM: CPT

## 2025-05-22 RX ORDER — DEXAMETHASONE SODIUM PHOSPHATE 10 MG/ML
8 INJECTION, SOLUTION INTRAMUSCULAR; INTRAVENOUS ONCE
Status: COMPLETED | OUTPATIENT
Start: 2025-05-22 | End: 2025-05-22

## 2025-05-22 RX ORDER — KETOROLAC TROMETHAMINE 15 MG/ML
15 INJECTION, SOLUTION INTRAMUSCULAR; INTRAVENOUS ONCE
Status: COMPLETED | OUTPATIENT
Start: 2025-05-22 | End: 2025-05-22

## 2025-05-22 RX ORDER — 0.9 % SODIUM CHLORIDE 0.9 %
1000 INTRAVENOUS SOLUTION INTRAVENOUS ONCE
Status: COMPLETED | OUTPATIENT
Start: 2025-05-22 | End: 2025-05-22

## 2025-05-22 RX ORDER — DIPHENHYDRAMINE HYDROCHLORIDE 50 MG/ML
50 INJECTION, SOLUTION INTRAMUSCULAR; INTRAVENOUS ONCE
Status: COMPLETED | OUTPATIENT
Start: 2025-05-22 | End: 2025-05-22

## 2025-05-22 RX ORDER — BUTALBITAL, ACETAMINOPHEN AND CAFFEINE 50; 325; 40 MG/1; MG/1; MG/1
1 TABLET ORAL EVERY 6 HOURS PRN
Qty: 20 TABLET | Refills: 0 | Status: SHIPPED | OUTPATIENT
Start: 2025-05-22 | End: 2025-05-27

## 2025-05-22 RX ADMIN — KETOROLAC TROMETHAMINE 15 MG: 15 INJECTION, SOLUTION INTRAMUSCULAR; INTRAVENOUS at 13:58

## 2025-05-22 RX ADMIN — DEXAMETHASONE SODIUM PHOSPHATE 8 MG: 10 INJECTION, SOLUTION INTRAMUSCULAR; INTRAVENOUS at 14:01

## 2025-05-22 RX ADMIN — HYDROMORPHONE HYDROCHLORIDE 0.5 MG: 1 INJECTION, SOLUTION INTRAMUSCULAR; INTRAVENOUS; SUBCUTANEOUS at 15:20

## 2025-05-22 RX ADMIN — DIPHENHYDRAMINE HYDROCHLORIDE 50 MG: 50 INJECTION INTRAMUSCULAR; INTRAVENOUS at 13:59

## 2025-05-22 RX ADMIN — SODIUM CHLORIDE 1000 ML: 0.9 INJECTION, SOLUTION INTRAVENOUS at 13:55

## 2025-05-22 ASSESSMENT — PAIN DESCRIPTION - DESCRIPTORS
DESCRIPTORS: ACHING;THROBBING
DESCRIPTORS: ACHING
DESCRIPTORS: ACHING

## 2025-05-22 ASSESSMENT — PAIN SCALES - GENERAL
PAINLEVEL_OUTOF10: 9
PAINLEVEL_OUTOF10: 6
PAINLEVEL_OUTOF10: 8
PAINLEVEL_OUTOF10: 6

## 2025-05-22 ASSESSMENT — PAIN DESCRIPTION - LOCATION
LOCATION: HEAD

## 2025-05-22 NOTE — ED TRIAGE NOTES
Pt arrives with complaint of migraine since yesterday afternoon. Nausea, dizziness, and headache present. No CP or SOB. Hx of migraines and afib. Pt has taken migraine medication without relief.

## 2025-05-22 NOTE — ED PROVIDER NOTES
[facility] EMERGENCY DEPARTMENT  EMERGENCY DEPARTMENT ENCOUNTER      Pt Name: Chris Egan  MRN: [id number]  Birthdate 1960  Date of evaluation: 5/22/2025  Provider: Georgie Mckeon DO    CHIEF COMPLAINT       Chief Complaint   Patient presents with    Headache         HISTORY OF PRESENT ILLNESS   (Location/Symptom, Timing/Onset, Context/Setting, Quality, Duration, Modifying Factors, Severity)  Note limiting factors.   HPI      Review of External Medical Records:     Nursing Notes were reviewed.    REVIEW OF SYSTEMS    (2-9 systems for level 4, 10 or more for level 5)     Review of Systems    Except as noted above the remainder of the review of systems was reviewed and negative.       PAST MEDICAL HISTORY     Past Medical History:   Diagnosis Date    Anxiety     Arrhythmia     SINUS TACHY    Jacobs's esophagus 3/19/2012    CAD (coronary artery disease)     Carpal tunnel syndrome of right wrist     Cellulitis of right wrist 05/12/2020    Chronic pain     Depression     Diabetes mellitus (HCC) 3/19/2012    GERD (gastroesophageal reflux disease)     Headache     Hearing loss     History of MI (myocardial infarction) 3-00    History of peptic ulcer disease 3/19/2012    HTN (hypertension) 3/19/2012    Memory loss     Migraine     Morbid obesity (HCC) 3/19/2012    2002-OPEN GASTRIC BYPASS    Other ill-defined conditions(799.89)     MIGRAINES    Psychiatric disorder     Reflux 3/19/2012    Sleep apnea, obstructive 3/19/2012    Unspecified adverse effect of anesthesia     HEADACHE    Unspecified sleep apnea     HX-PRIOR TO GASTRIC BYPASS         SURGICAL HISTORY       Past Surgical History:   Procedure Laterality Date    APPENDECTOMY      BIOPSY LIVER  9-3-02    CHOLECYSTECTOMY      GASTRIC BYPASS SURGERY  9-3-02    HERNIA REPAIR  4.3.2012    Dr. Apodaca - laparoscopic incisional hernia repair    LAP,VAGUS NERVE,TRUNCAL      NEUROLOGICAL SURGERY  2004    CERVICAL FUSION    ORTHOPEDIC SURGERY Right  focal deficit present.      Mental Status: He is alert.         DIAGNOSTIC RESULTS     EKG: All EKG's are interpreted by the Emergency Department Physician who either signs or Co-signs this chart in the absence of a cardiologist.        RADIOLOGY:   Non-plain film images such as CT, Ultrasound and MRI are read by the radiologist. Plain radiographic images are visualized and preliminarily interpreted by the emergency physician with the below findings:        Interpretation per the Radiologist below, if available at the time of this note:    No orders to display        LABS:  Labs Reviewed   CBC WITH AUTO DIFFERENTIAL - Abnormal; Notable for the following components:       Result Value    RDW 14.6 (*)     Neutrophils % 76.6 (*)     Monocytes % 4.2 (*)     All other components within normal limits   COMPREHENSIVE METABOLIC PANEL - Abnormal; Notable for the following components:    Sodium 135 (*)     Glucose 164 (*)     BUN/Creatinine Ratio 10 (*)     Alk Phosphatase 178 (*)     Albumin 3.3 (*)     Albumin/Globulin Ratio 0.9 (*)     All other components within normal limits       All other labs were within normal range or not returned as of this dictation.    EMERGENCY DEPARTMENT COURSE and DIFFERENTIAL DIAGNOSIS/MDM:   Vitals:    Vitals:    05/22/25 1257 05/22/25 1428 05/22/25 1520   BP: 125/63     Pulse: 66     Resp: 18  18   Temp:  98.2 °F (36.8 °C)    TempSrc:  Oral    SpO2: 99%             Medical Decision Making  64-year-old male presents emergency department with chief complaint of gradual onset of headache since yesterday afternoon.  States he has some dizziness nausea and headache.  No chest pain shortness of breath.  He has a history of migraines as well as atrial fibrillation.  He is not currently on any blood thinners.  Feels to be in a regular heart rhythm at this time.  Discussed with him doing CT scan but he states that this feels like his normal migraine and he generally improves with the migraine

## 2025-06-10 ENCOUNTER — HOSPITAL ENCOUNTER (EMERGENCY)
Facility: HOSPITAL | Age: 65
Discharge: HOME | End: 2025-06-10
Attending: STUDENT IN AN ORGANIZED HEALTH CARE EDUCATION/TRAINING PROGRAM
Payer: MEDICARE

## 2025-06-10 ENCOUNTER — APPOINTMENT (OUTPATIENT)
Facility: HOSPITAL | Age: 65
End: 2025-06-10
Payer: MEDICARE

## 2025-06-10 VITALS
RESPIRATION RATE: 12 BRPM | OXYGEN SATURATION: 92 % | SYSTOLIC BLOOD PRESSURE: 126 MMHG | TEMPERATURE: 98.1 F | DIASTOLIC BLOOD PRESSURE: 46 MMHG | BODY MASS INDEX: 22.89 KG/M2 | HEART RATE: 46 BPM | WEIGHT: 169 LBS | HEIGHT: 72 IN

## 2025-06-10 DIAGNOSIS — R51.9 ACUTE NONINTRACTABLE HEADACHE, UNSPECIFIED HEADACHE TYPE: Primary | ICD-10-CM

## 2025-06-10 DIAGNOSIS — I65.02 STENOSIS OF LEFT VERTEBRAL ARTERY: ICD-10-CM

## 2025-06-10 LAB
ALBUMIN SERPL-MCNC: 3.2 G/DL (ref 3.5–5)
ALBUMIN/GLOB SERPL: 0.8 (ref 1.1–2.2)
ALP SERPL-CCNC: 155 U/L (ref 45–117)
ALT SERPL-CCNC: 16 U/L (ref 12–78)
ANION GAP SERPL CALC-SCNC: 4 MMOL/L (ref 2–12)
AST SERPL-CCNC: 13 U/L (ref 15–37)
BASOPHILS # BLD: 0.05 K/UL (ref 0–0.1)
BASOPHILS NFR BLD: 0.6 % (ref 0–1)
BILIRUB SERPL-MCNC: 0.3 MG/DL (ref 0.2–1)
BUN SERPL-MCNC: 4 MG/DL (ref 6–20)
BUN/CREAT SERPL: 5 (ref 12–20)
CALCIUM SERPL-MCNC: 9 MG/DL (ref 8.5–10.1)
CHLORIDE SERPL-SCNC: 105 MMOL/L (ref 97–108)
CO2 SERPL-SCNC: 30 MMOL/L (ref 21–32)
COMMENT:: NORMAL
CREAT SERPL-MCNC: 0.8 MG/DL (ref 0.7–1.3)
DIFFERENTIAL METHOD BLD: ABNORMAL
EOSINOPHIL # BLD: 0.27 K/UL (ref 0–0.4)
EOSINOPHIL NFR BLD: 3.1 % (ref 0–7)
ERYTHROCYTE [DISTWIDTH] IN BLOOD BY AUTOMATED COUNT: 14.6 % (ref 11.5–14.5)
GLOBULIN SER CALC-MCNC: 3.8 G/DL (ref 2–4)
GLUCOSE SERPL-MCNC: 109 MG/DL (ref 65–100)
HCT VFR BLD AUTO: 40.7 % (ref 36.6–50.3)
HGB BLD-MCNC: 13 G/DL (ref 12.1–17)
IMM GRANULOCYTES # BLD AUTO: 0.04 K/UL (ref 0–0.04)
IMM GRANULOCYTES NFR BLD AUTO: 0.5 % (ref 0–0.5)
LYMPHOCYTES # BLD: 1.57 K/UL (ref 0.8–3.5)
LYMPHOCYTES NFR BLD: 18.2 % (ref 12–49)
MAGNESIUM SERPL-MCNC: 2 MG/DL (ref 1.6–2.4)
MCH RBC QN AUTO: 28 PG (ref 26–34)
MCHC RBC AUTO-ENTMCNC: 31.9 G/DL (ref 30–36.5)
MCV RBC AUTO: 87.5 FL (ref 80–99)
MONOCYTES # BLD: 0.55 K/UL (ref 0–1)
MONOCYTES NFR BLD: 6.4 % (ref 5–13)
NEUTS SEG # BLD: 6.15 K/UL (ref 1.8–8)
NEUTS SEG NFR BLD: 71.2 % (ref 32–75)
NRBC # BLD: 0 K/UL (ref 0–0.01)
NRBC BLD-RTO: 0 PER 100 WBC
PLATELET # BLD AUTO: 309 K/UL (ref 150–400)
PMV BLD AUTO: 10.5 FL (ref 8.9–12.9)
POTASSIUM SERPL-SCNC: 4.2 MMOL/L (ref 3.5–5.1)
PROT SERPL-MCNC: 7 G/DL (ref 6.4–8.2)
RBC # BLD AUTO: 4.65 M/UL (ref 4.1–5.7)
SODIUM SERPL-SCNC: 139 MMOL/L (ref 136–145)
SPECIMEN HOLD: NORMAL
TROPONIN I SERPL HS-MCNC: 5 NG/L (ref 0–76)
WBC # BLD AUTO: 8.6 K/UL (ref 4.1–11.1)

## 2025-06-10 PROCEDURE — 83735 ASSAY OF MAGNESIUM: CPT

## 2025-06-10 PROCEDURE — 96375 TX/PRO/DX INJ NEW DRUG ADDON: CPT

## 2025-06-10 PROCEDURE — 96365 THER/PROPH/DIAG IV INF INIT: CPT

## 2025-06-10 PROCEDURE — 99285 EMERGENCY DEPT VISIT HI MDM: CPT

## 2025-06-10 PROCEDURE — 2580000003 HC RX 258: Performed by: STUDENT IN AN ORGANIZED HEALTH CARE EDUCATION/TRAINING PROGRAM

## 2025-06-10 PROCEDURE — 84484 ASSAY OF TROPONIN QUANT: CPT

## 2025-06-10 PROCEDURE — 80053 COMPREHEN METABOLIC PANEL: CPT

## 2025-06-10 PROCEDURE — 70498 CT ANGIOGRAPHY NECK: CPT

## 2025-06-10 PROCEDURE — 93005 ELECTROCARDIOGRAM TRACING: CPT | Performed by: STUDENT IN AN ORGANIZED HEALTH CARE EDUCATION/TRAINING PROGRAM

## 2025-06-10 PROCEDURE — 6360000002 HC RX W HCPCS: Performed by: STUDENT IN AN ORGANIZED HEALTH CARE EDUCATION/TRAINING PROGRAM

## 2025-06-10 PROCEDURE — 85025 COMPLETE CBC W/AUTO DIFF WBC: CPT

## 2025-06-10 PROCEDURE — 36415 COLL VENOUS BLD VENIPUNCTURE: CPT

## 2025-06-10 PROCEDURE — 6360000004 HC RX CONTRAST MEDICATION: Performed by: STUDENT IN AN ORGANIZED HEALTH CARE EDUCATION/TRAINING PROGRAM

## 2025-06-10 PROCEDURE — 70450 CT HEAD/BRAIN W/O DYE: CPT

## 2025-06-10 RX ORDER — PROCHLORPERAZINE EDISYLATE 5 MG/ML
10 INJECTION INTRAMUSCULAR; INTRAVENOUS
Status: COMPLETED | OUTPATIENT
Start: 2025-06-10 | End: 2025-06-10

## 2025-06-10 RX ORDER — 0.9 % SODIUM CHLORIDE 0.9 %
1000 INTRAVENOUS SOLUTION INTRAVENOUS ONCE
Status: COMPLETED | OUTPATIENT
Start: 2025-06-10 | End: 2025-06-10

## 2025-06-10 RX ORDER — MAGNESIUM SULFATE IN WATER 40 MG/ML
2000 INJECTION, SOLUTION INTRAVENOUS
Status: COMPLETED | OUTPATIENT
Start: 2025-06-10 | End: 2025-06-10

## 2025-06-10 RX ORDER — IOPAMIDOL 755 MG/ML
100 INJECTION, SOLUTION INTRAVASCULAR
Status: COMPLETED | OUTPATIENT
Start: 2025-06-10 | End: 2025-06-10

## 2025-06-10 RX ORDER — KETOROLAC TROMETHAMINE 15 MG/ML
15 INJECTION, SOLUTION INTRAMUSCULAR; INTRAVENOUS ONCE
Status: COMPLETED | OUTPATIENT
Start: 2025-06-10 | End: 2025-06-10

## 2025-06-10 RX ORDER — DIPHENHYDRAMINE HYDROCHLORIDE 50 MG/ML
50 INJECTION, SOLUTION INTRAMUSCULAR; INTRAVENOUS
Status: COMPLETED | OUTPATIENT
Start: 2025-06-10 | End: 2025-06-10

## 2025-06-10 RX ADMIN — IOPAMIDOL 100 ML: 755 INJECTION, SOLUTION INTRAVENOUS at 15:50

## 2025-06-10 RX ADMIN — PROCHLORPERAZINE EDISYLATE 10 MG: 5 INJECTION INTRAMUSCULAR; INTRAVENOUS at 15:41

## 2025-06-10 RX ADMIN — SODIUM CHLORIDE 1000 ML: 0.9 INJECTION, SOLUTION INTRAVENOUS at 15:34

## 2025-06-10 RX ADMIN — KETOROLAC TROMETHAMINE 15 MG: 15 INJECTION, SOLUTION INTRAMUSCULAR; INTRAVENOUS at 15:34

## 2025-06-10 RX ADMIN — DIPHENHYDRAMINE HYDROCHLORIDE 50 MG: 50 INJECTION INTRAMUSCULAR; INTRAVENOUS at 15:37

## 2025-06-10 RX ADMIN — MAGNESIUM SULFATE HEPTAHYDRATE 2000 MG: 40 INJECTION, SOLUTION INTRAVENOUS at 18:00

## 2025-06-10 ASSESSMENT — ENCOUNTER SYMPTOMS: SHORTNESS OF BREATH: 0

## 2025-06-10 ASSESSMENT — PAIN DESCRIPTION - ORIENTATION: ORIENTATION: ANTERIOR

## 2025-06-10 ASSESSMENT — PAIN SCALES - GENERAL: PAINLEVEL_OUTOF10: 9

## 2025-06-10 ASSESSMENT — PAIN - FUNCTIONAL ASSESSMENT: PAIN_FUNCTIONAL_ASSESSMENT: 0-10

## 2025-06-10 ASSESSMENT — PAIN DESCRIPTION - LOCATION: LOCATION: HEAD

## 2025-06-10 ASSESSMENT — PAIN DESCRIPTION - DESCRIPTORS: DESCRIPTORS: ACHING

## 2025-06-10 NOTE — ED PROVIDER NOTES
Take 1 tablet by mouth dailyHistorical Med      traZODone (DESYREL) 50 MG tablet TAKE 1 TO 2 TABLETS BY MOUTH ONCE DAILY AT NIGHTHistorical Med      SUMAtriptan Succinate Refill 6 MG/0.5ML SOCT INJECT 0.5 ML ONCE AS NEEDED FOR MIGRAINE UP TO 1 DOSE *INS LIMITS 4ML/30 DAYS*, Disp-15 mL, R-11Normal      Ubrogepant 100 MG TABS 1 at HA onset and repeat in 2 hours x 1 prn  Max 2 tabs/24 hours, Disp-16 tablet, R-11Normal      DULoxetine (CYMBALTA) 30 MG extended release capsule Take 1 pill nightly for 30 days. If needed, may increase the dose to 2 pills., Disp-60 capsule, R-2Normal      famotidine (PEPCID) 20 MG tablet Take 1 tablet by mouth 2 times daily, Disp-60 tablet, R-3Normal      Ascorbic Acid 250 MG CHEW Take by mouthHistorical Med      cyanocobalamin 500 MCG tablet Take 1 tablet by mouth dailyHistorical Med      lansoprazole (PREVACID) 30 MG delayed release capsule Take by mouth 2 times dailyHistorical Med      metoclopramide (REGLAN) 10 MG tablet Take 1 tablet by mouth 2 times dailyHistorical Med      metoprolol succinate (TOPROL XL) 25 MG extended release tablet Take 1 tablet by mouth dailyHistorical Med      rivaroxaban (XARELTO) 20 MG TABS tablet ceived the following from [facility]: Outside name: Xarelto 20 mg tab tabletHistorical Med      rosuvastatin (CRESTOR) 20 MG tablet Take 1 tablet by mouth dailyHistorical Med             ALLERGIES     Amoxicillin-pot clavulanate    FAMILY HISTORY       Family History   Problem Relation Age of Onset    Other Brother         aneurysm    Headache Brother     Alcohol Abuse Brother     Migraines Brother     Other Sister         aneurysm    Obesity Sister     Cancer Father         prostate    Heart Disease Mother     Emphysema Mother     Obesity Mother     Diabetes Mother     Hypertension Mother           SOCIAL HISTORY       Social History     Socioeconomic History    Marital status:    Tobacco Use    Smoking status: Every Day     Current

## 2025-06-10 NOTE — ED TRIAGE NOTES
Ambulatory with complaints of a migraine since Friday night reporting this is worse than normal.     Reports fatigue. States \"I have the flashing lights like I normally do with dizziness when I stand up\".

## 2025-06-11 LAB
EKG ATRIAL RATE: 48 BPM
EKG DIAGNOSIS: NORMAL
EKG P AXIS: 85 DEGREES
EKG P-R INTERVAL: 138 MS
EKG Q-T INTERVAL: 426 MS
EKG QRS DURATION: 78 MS
EKG QTC CALCULATION (BAZETT): 380 MS
EKG R AXIS: 67 DEGREES
EKG T AXIS: 70 DEGREES
EKG VENTRICULAR RATE: 48 BPM

## 2025-06-11 PROCEDURE — 93010 ELECTROCARDIOGRAM REPORT: CPT | Performed by: SPECIALIST

## 2025-07-02 NOTE — ED PROVIDER NOTES
[facility] EMERGENCY DEPT  EMERGENCY DEPARTMENT ENCOUNTER      Pt Name: Chris Egan  MRN: [id number]  Birthdate 1960  Date of evaluation: 11/3/2024  Provider: Albino Downing PA-C    CHIEF COMPLAINT       Chief Complaint   Patient presents with    Migraine         HISTORY OF PRESENT ILLNESS   (Location/Symptom, Timing/Onset, Context/Setting, Quality, Duration, Modifying Factors, Severity)  Note limiting factors.   63-year-old M with history of DM, HTN, migraine headaches followed by neurology, presenting to emergency department for relief of typical migraine headache onset yesterday that has not gone away with at home migraine regimen.  Typical migraine headache with photophobia and nausea.  Denies head trauma, injury, visual changes, fevers.  No strokelike symptoms.            Review of External Medical Records:     Nursing Notes were reviewed.    REVIEW OF SYSTEMS    (2-9 systems for level 4, 10 or more for level 5)     Review of Systems   Eyes:  Positive for photophobia.   Gastrointestinal:  Positive for nausea.   Neurological:  Positive for headaches.   All other systems reviewed and are negative.      Except as noted above the remainder of the review of systems was reviewed and negative.       PAST MEDICAL HISTORY     Past Medical History:   Diagnosis Date    Anxiety     Arrhythmia     SINUS TACHY    Jacobs's esophagus 3/19/2012    CAD (coronary artery disease)     Carpal tunnel syndrome of right wrist     Cellulitis of right wrist 05/12/2020    Chronic pain     Depression     Diabetes mellitus (HCC) 3/19/2012    GERD (gastroesophageal reflux disease)     Headache     Hearing loss     History of MI (myocardial infarction) 3-00    History of peptic ulcer disease 3/19/2012    HTN (hypertension) 3/19/2012    Memory loss     Migraine     Morbid obesity 3/19/2012    2002-OPEN GASTRIC BYPASS    Other ill-defined conditions(799.89)     MIGRAINES    Psychiatric disorder     Reflux 3/19/2012    Sleep apnea,  Checked status. PA pending/waiting for payer

## (undated) DEVICE — TOWEL SURG W17XL27IN STD BLU COT NONFENESTRATED PREWASHED

## (undated) DEVICE — REM POLYHESIVE ADULT PATIENT RETURN ELECTRODE: Brand: VALLEYLAB

## (undated) DEVICE — SOLUTION IV 1000ML 0.9% SOD CHL

## (undated) DEVICE — PACK,BASIC,SIRUS,V: Brand: MEDLINE

## (undated) DEVICE — BNDG ELAS HK LOOP 4X5YD NS -- MATRIX

## (undated) DEVICE — SUT ETHLN 3-0 18IN PS1 BLK --

## (undated) DEVICE — INFECTION CONTROL KIT SYS

## (undated) DEVICE — DRAPE,EXTREMITY,89X128,STERILE: Brand: MEDLINE

## (undated) DEVICE — STERILE POLYISOPRENE POWDER-FREE SURGICAL GLOVES WITH EMOLLIENT COATING: Brand: PROTEXIS

## (undated) DEVICE — INTENDED FOR TISSUE SEPARATION, AND OTHER PROCEDURES THAT REQUIRE A SHARP SURGICAL BLADE TO PUNCTURE OR CUT.: Brand: BARD-PARKER ® CARBON RIB-BACK BLADES

## (undated) DEVICE — HANDLE LT SNAP ON ULT DURABLE LENS FOR TRUMPF ALC DISPOSABLE

## (undated) DEVICE — DRAPE,REIN 53X77,STERILE: Brand: MEDLINE

## (undated) DEVICE — PAD,ABDOMINAL,5"X9",ST,LF,25/BX: Brand: MEDLINE INDUSTRIES, INC.

## (undated) DEVICE — PADDING CST 4IN STERILE --

## (undated) DEVICE — DRESSING,GAUZE,XEROFORM,CURAD,1"X8",ST: Brand: CURAD

## (undated) DEVICE — STRAP,POSITIONING,KNEE/BODY,FOAM,4X60": Brand: MEDLINE

## (undated) DEVICE — SPONGE GZ W4XL4IN COT 12 PLY TYP VII WVN C FLD DSGN

## (undated) DEVICE — SURGICAL PROCEDURE PACK BASIN MAJ SET CUST NO CAUT